# Patient Record
Sex: FEMALE | Race: WHITE | NOT HISPANIC OR LATINO | Employment: PART TIME | URBAN - METROPOLITAN AREA
[De-identification: names, ages, dates, MRNs, and addresses within clinical notes are randomized per-mention and may not be internally consistent; named-entity substitution may affect disease eponyms.]

---

## 2020-09-08 ENCOUNTER — OFFICE VISIT (OUTPATIENT)
Dept: OBGYN CLINIC | Facility: CLINIC | Age: 25
End: 2020-09-08
Payer: COMMERCIAL

## 2020-09-08 VITALS
BODY MASS INDEX: 32.31 KG/M2 | HEIGHT: 60 IN | TEMPERATURE: 97 F | DIASTOLIC BLOOD PRESSURE: 60 MMHG | SYSTOLIC BLOOD PRESSURE: 106 MMHG | WEIGHT: 164.6 LBS

## 2020-09-08 DIAGNOSIS — Z30.432 ENCOUNTER FOR IUD REMOVAL: Primary | ICD-10-CM

## 2020-09-10 PROCEDURE — 58301 REMOVE INTRAUTERINE DEVICE: CPT | Performed by: OBSTETRICS & GYNECOLOGY

## 2020-09-10 NOTE — PROGRESS NOTES
Gynecology   Gi Vargas 21 y o  female MRN: [id number]    Assessment/Plan     Assessment:  Encounter for IUD removal    Plan:  IUD removed today without complication  Discussed good health, PNV's preconception  RTO PRN/annual    History of Present Illness     HPI:  Gi Vargas is a 21 y o  female who presents for [facility] IUD removal   Planning to conceive in the next few months  No complaints  Historical Information   History reviewed  No pertinent past medical history  Past Surgical History:   Procedure Laterality Date    WISDOM TOOTH EXTRACTION  2008     OB/GYN History:   Nulligravida  No h/o cervical dysplasia or STD's    Family History   Problem Relation Age of Onset    Lupus Mother     Migraines Mother     Migraines Maternal Grandmother     Diabetes Maternal Grandfather     Arthritis Paternal Grandmother     Diabetes Paternal Grandmother     Hypertension Paternal Grandmother     Obesity Paternal Grandmother     Arthritis Paternal Grandfather     Arthritis Paternal Uncle     Breast cancer Other         PGGM    Migraines Maternal Aunt      Social History   Social History     Substance and Sexual Activity   Alcohol Use Yes    Comment: Social     Social History     Substance and Sexual Activity   Drug Use Never     Social History     Tobacco Use   Smoking Status Never Smoker   Smokeless Tobacco Never Used     E-Cigarette/Vaping    E-Cigarette Use Never User      E-Cigarette/Vaping Substances    Nicotine No     THC No     CBD No     Flavoring No     Other No     Unknown No        Meds/Allergies     Current Outpatient Medications:     levonorgestrel (ALEX) 13 5 MG intrauterine device, 1 Intra Uterine Device by Intrauterine route once, Disp: , Rfl:     No Known Allergies    Review of Systems   Respiratory: Negative for cough, shortness of breath, sputum production and wheezing  Gastrointestinal: Negative for bloating, abdominal pain, nausea and vomiting     Genitourinary: Negative for dysuria, frequency, non-menstrual bleeding and pelvic pain  Neurological: Negative for dizziness, headaches, light-headedness and weakness  Objective   Vitals: Blood pressure 106/60, temperature (!) 97 °F (36 1 °C), temperature source Tympanic, height 5' 0 25" (1 53 m), weight 74 7 kg (164 lb 9 6 oz), last menstrual period 08/16/2020  Physical Exam  Constitutional:       Appearance: Normal appearance  Genitourinary:      Vulva, urethra and vagina normal       IUD strings visualized  HENT:      Head: Normocephalic  Cardiovascular:      Rate and Rhythm: Normal rate and regular rhythm  Pulmonary:      Effort: Pulmonary effort is normal       Breath sounds: Normal breath sounds  Abdominal:      Palpations: Abdomen is soft  Tenderness: There is no abdominal tenderness  Musculoskeletal:         General: No swelling  Neurological:      General: No focal deficit present  Mental Status: She is alert and oriented to person, place, and time  Skin:     General: Skin is warm and dry  Psychiatric:         Mood and Affect: Mood normal          Behavior: Behavior normal    Vitals signs reviewed       Iud removal    Date/Time: 9/10/2020 7:50 AM  Performed by: Rianna Cope MD  Authorized by: Rianna Cope MD     Consent:     Consent obtained:  Verbal    Consent given by:  Patient    Procedure risks and benefits discussed: yes      Patient questions answered: yes      Patient agrees, verbalizes understanding, and wants to proceed: yes    Procedure:     Removed with no complications: yes      Other reason for removal:  Conception planning

## 2020-09-15 ENCOUNTER — TELEPHONE (OUTPATIENT)
Dept: OBGYN CLINIC | Facility: CLINIC | Age: 25
End: 2020-09-15

## 2020-09-15 NOTE — TELEPHONE ENCOUNTER
Pt had her iud removed a week ago and is having bleeding since that day but today started heavy  Is that ok?

## 2020-09-15 NOTE — TELEPHONE ENCOUNTER
Returned pts' p c - pt states "she did have some spotting after her iud was removed, but now today her blding is more like a period "  Informed pt that irreg blding is normal after iud removal & could very well be a menses- advised to monitor for now-keep track of date, as she is trying to conceive  Pt verbalized understanding

## 2020-12-01 ENCOUNTER — ULTRASOUND (OUTPATIENT)
Dept: OBGYN CLINIC | Facility: CLINIC | Age: 25
End: 2020-12-01
Payer: COMMERCIAL

## 2020-12-01 VITALS — WEIGHT: 171 LBS | SYSTOLIC BLOOD PRESSURE: 112 MMHG | BODY MASS INDEX: 33.12 KG/M2 | DIASTOLIC BLOOD PRESSURE: 78 MMHG

## 2020-12-01 DIAGNOSIS — N91.2 AMENORRHEA: Primary | ICD-10-CM

## 2020-12-01 PROCEDURE — 76830 TRANSVAGINAL US NON-OB: CPT | Performed by: OBSTETRICS & GYNECOLOGY

## 2020-12-01 PROCEDURE — 99214 OFFICE O/P EST MOD 30 MIN: CPT | Performed by: OBSTETRICS & GYNECOLOGY

## 2020-12-14 ENCOUNTER — TELEMEDICINE (OUTPATIENT)
Dept: OBGYN CLINIC | Facility: CLINIC | Age: 25
End: 2020-12-14

## 2020-12-14 DIAGNOSIS — Z34.01 ENCOUNTER FOR SUPERVISION OF NORMAL FIRST PREGNANCY IN FIRST TRIMESTER: Primary | ICD-10-CM

## 2020-12-14 PROCEDURE — OBC: Performed by: OBSTETRICS & GYNECOLOGY

## 2020-12-14 RX ORDER — ALBUTEROL SULFATE 90 UG/1
2 AEROSOL, METERED RESPIRATORY (INHALATION) EVERY 6 HOURS PRN
COMMUNITY
End: 2022-06-22 | Stop reason: ALTCHOICE

## 2020-12-29 ENCOUNTER — INITIAL PRENATAL (OUTPATIENT)
Dept: OBGYN CLINIC | Facility: CLINIC | Age: 25
End: 2020-12-29

## 2020-12-29 VITALS — SYSTOLIC BLOOD PRESSURE: 106 MMHG | WEIGHT: 170.8 LBS | BODY MASS INDEX: 33.08 KG/M2 | DIASTOLIC BLOOD PRESSURE: 66 MMHG

## 2020-12-29 DIAGNOSIS — Z11.3 SCREENING FOR STD (SEXUALLY TRANSMITTED DISEASE): ICD-10-CM

## 2020-12-29 DIAGNOSIS — B37.3 YEAST INFECTION OF THE VAGINA: ICD-10-CM

## 2020-12-29 DIAGNOSIS — Z34.01 ENCOUNTER FOR SUPERVISION OF NORMAL FIRST PREGNANCY IN FIRST TRIMESTER: Primary | ICD-10-CM

## 2020-12-29 PROBLEM — B37.31 YEAST INFECTION OF THE VAGINA: Status: ACTIVE | Noted: 2020-12-29

## 2020-12-29 LAB
SL AMB  POCT GLUCOSE, UA: NORMAL
SL AMB POCT URINE PROTEIN: NORMAL

## 2020-12-29 PROCEDURE — 87491 CHLMYD TRACH DNA AMP PROBE: CPT | Performed by: NURSE PRACTITIONER

## 2020-12-29 PROCEDURE — 87591 N.GONORRHOEAE DNA AMP PROB: CPT | Performed by: NURSE PRACTITIONER

## 2020-12-29 PROCEDURE — PNV: Performed by: NURSE PRACTITIONER

## 2020-12-29 PROCEDURE — G0145 SCR C/V CYTO,THINLAYER,RESCR: HCPCS | Performed by: NURSE PRACTITIONER

## 2021-01-01 LAB
C TRACH DNA SPEC QL NAA+PROBE: NEGATIVE
N GONORRHOEA DNA SPEC QL NAA+PROBE: NEGATIVE

## 2021-01-04 LAB
LAB AP GYN PRIMARY INTERPRETATION: NORMAL
Lab: NORMAL
PATH INTERP SPEC-IMP: NORMAL

## 2021-01-05 ENCOUNTER — TELEPHONE (OUTPATIENT)
Dept: OBGYN CLINIC | Facility: CLINIC | Age: 26
End: 2021-01-05

## 2021-01-05 ENCOUNTER — TELEPHONE (OUTPATIENT)
Dept: PERINATAL CARE | Facility: CLINIC | Age: 26
End: 2021-01-05

## 2021-01-05 NOTE — TELEPHONE ENCOUNTER
Patient has not received her prenatal packet as of yet  Would like to go for blood work but needs to go to [facility]  Faxed labs to [facility] # Fax [phone]

## 2021-01-05 NOTE — TELEPHONE ENCOUNTER
----- Message from [address] [place] [address] sent at 1/4/2021 11:45 AM EST -----  Please notify patient pap and Gc/Chlamydia are negative from PN1 visit  Her pap was positive for yeast, which I prescribed terconazole for at PN1 visit  Thank you!

## 2021-01-05 NOTE — TELEPHONE ENCOUNTER
Patient notified pap and gc/ch were negative  Patient already picked up antibiotic for yeast infection

## 2021-01-05 NOTE — TELEPHONE ENCOUNTER
Pt called today and would like to speak to someone about her blood work that she need to get done  She hasn't received her packet in the mail and is unaware of what she needs to get done

## 2021-01-05 NOTE — TELEPHONE ENCOUNTER
Attempted to reach patient by phone and left voicemail to confirm appointment for MFM ultrasound  1 support person ( must be over the age of 15) may accompany you for your appointment  If you or your support person have traveled outside the state in the past 2 weeks, please call and notify our office today #[phone]  You and your support person must wear a mask ,covering nose and mouth,during your entire visit  To minimize your exposure in our waiting room, please call our office prior to entering the building  Check in and rooming questions will be done via phone  We will give you directions when to enter for your appointment  Arlene: [phone]    IF you are not feeling well- cough, fever, shortness of breath or any flu like symptoms, contact your primary care physician or [phone] Giovana Dior  If you are awaiting COVID 19 test results please call and reschedule your appointment    Any questions with these instructions please call Maternal Fetal Medicine nurse line today @ # [phone]

## 2021-01-06 ENCOUNTER — ROUTINE PRENATAL (OUTPATIENT)
Dept: PERINATAL CARE | Facility: CLINIC | Age: 26
End: 2021-01-06
Payer: COMMERCIAL

## 2021-01-06 VITALS
WEIGHT: 171.2 LBS | BODY MASS INDEX: 32.32 KG/M2 | HEART RATE: 87 BPM | SYSTOLIC BLOOD PRESSURE: 125 MMHG | HEIGHT: 61 IN | DIASTOLIC BLOOD PRESSURE: 60 MMHG

## 2021-01-06 DIAGNOSIS — Z3A.13 13 WEEKS GESTATION OF PREGNANCY: Primary | ICD-10-CM

## 2021-01-06 DIAGNOSIS — J45.20 INTERMITTENT ASTHMA WITHOUT COMPLICATION, UNSPECIFIED ASTHMA SEVERITY: ICD-10-CM

## 2021-01-06 DIAGNOSIS — Z13.79 GENETIC SCREENING: ICD-10-CM

## 2021-01-06 DIAGNOSIS — Z34.01 ENCOUNTER FOR SUPERVISION OF NORMAL FIRST PREGNANCY IN FIRST TRIMESTER: ICD-10-CM

## 2021-01-06 DIAGNOSIS — Z36.82 NUCHAL TRANSLUCENCY OF FETUS ON PRENATAL ULTRASOUND: ICD-10-CM

## 2021-01-06 PROCEDURE — 76813 OB US NUCHAL MEAS 1 GEST: CPT | Performed by: OBSTETRICS & GYNECOLOGY

## 2021-01-06 PROCEDURE — 99213 OFFICE O/P EST LOW 20 MIN: CPT | Performed by: OBSTETRICS & GYNECOLOGY

## 2021-01-06 NOTE — LETTER
2021     Narinder Hubbard, [address] [place] [address]    Patient: Jessica Shane   YOB: 1995   Date of Visit: 2021       Dear Dr Leni Magana: Thank you for referring Jessica Shane to me for evaluation  Below are my notes for this consultation  If you have questions, please do not hesitate to call me  I look forward to following your patient along with you  Sincerely,        Ozzy Valles MD        CC: No Recipients  Ozzy Valles MD  2021  8:35 AM  Sign when Signing Visit  Ob ultrasound and brief [facility] consulation    Ms Jeancarlos Dodge is a 30 yo   patient at 15 3/7 weeks  An ultrasound for viability, dating and nuchal translucency was completed today  See Ob Procedures in EPIC  1  Live, tompkins fetus with size = dates; MARIBEL 2021  Normal nuchal translucency  The Sequential Screen was discussed in detail, including the sensitivity for detection of Down syndrome estimated at 95%  Definitive prenatal diagnosis is possible only through genetic amniocentesis or CVS         Cell-free DNA (cfDNA)  (Non invasive prenatal testing)-NIPT) screening has a 99% detection rate for Down syndrome, 96% for trisomy 18, and 91% for trisomy 13 with <1% false positive rate    It may need to obtain approval from the insurance company  Ms Jeancarlos Dodge declined use of screening or diagnostic tests for fetal aneuploidy  Ob Hx: Primigravida      Medical hx: Asthma, diagnosed at age 1, not admitted to hospital       Surgical hx:  None      Medications: PNV; Claritin- seasonal; asthma inhaler  Allergies: seasonal  Family Hx: non contributory      Social Hx: No tobacco alcohol or illicit drug use  I reviewed the results of this ultrasound with Ms Jeancarlos Dodge and answered her questions  History of intermittent asthma  Asthma complicates 1-4% of pregnancies   The majority of women with asthma experience stability or improvement in symptoms in pregnancy, while approximately 1/3 experience worsening  Need for a rescue inhaler more than twice weekly indicates suboptimal asthma control and need for escalation in therapy  The majority of asthma medications are safe for pregnancy, and disease control is important for maternal and fetal health in pregnancy  If her symptoms remain well-controlled in pregnancy, no additional obstetric surveillance is indicated  However, if control is poor, there is a risk of fetal growth restriction, and evaluation of fetal growth in the second half of pregnancy is likely warranted  Prevention of respiratory morbidity is particularly important in pregnancy  Annual influenza recommendation is  recommended, as is pneumococcal vaccination if not performed previously  Recommendations:      1  [facility] for [facility] from 16 and before 22 weeks, to be ordered and followed from your office  2  Fetal Level II ultrasound imaging is scheduled at about 20 weeks gestation  The total time spent on this established patient on the encounter date was 20 minutes  This time included previsit service time of 5 minutes, face-to-face  service time of 10 minutes discussing the results of the ultrasound, medical history, findings and recomendations, and post-service time of 5 minutes  Thank you for referring your patient to our offices  The limitations of ultrasound to detect all anomalies was reviewed and how it is not  a test to rule out aneuploidy  If you have any further questions do not hesitate to contact us as [phone]      Nilson Lozada MD

## 2021-01-07 PROBLEM — J45.909 ASTHMA: Status: ACTIVE | Noted: 2021-01-07

## 2021-01-07 PROBLEM — Z3A.13 13 WEEKS GESTATION OF PREGNANCY: Status: ACTIVE | Noted: 2021-01-07

## 2021-01-07 PROBLEM — Z13.79 GENETIC SCREENING: Status: ACTIVE | Noted: 2021-01-07

## 2021-01-07 PROBLEM — Z36.82 NUCHAL TRANSLUCENCY OF FETUS ON PRENATAL ULTRASOUND: Status: ACTIVE | Noted: 2021-01-07

## 2021-01-07 LAB
ABO GROUP BLD: NORMAL
APPEARANCE UR: CLEAR
BACTERIA UR QL AUTO: ABNORMAL /HPF
BASOPHILS # BLD AUTO: 8 CELLS/UL (ref 0–200)
BASOPHILS NFR BLD AUTO: 0.1 %
BILIRUB UR QL STRIP: NEGATIVE
BLD GP AB SCN SERPL QL: NORMAL
COLOR UR: YELLOW
EOSINOPHIL # BLD AUTO: 149 CELLS/UL (ref 15–500)
EOSINOPHIL NFR BLD AUTO: 1.8 %
ERYTHROCYTE [DISTWIDTH] IN BLOOD BY AUTOMATED COUNT: 11.6 % (ref 11–15)
GLUCOSE UR QL STRIP: NEGATIVE
HBV SURFACE AG SERPL QL IA: NORMAL
HCT VFR BLD AUTO: 37.7 % (ref 35–45)
HGB BLD-MCNC: 12.8 G/DL (ref 11.7–15.5)
HGB UR QL STRIP: NEGATIVE
HIV 1+2 AB+HIV1 P24 AG SERPL QL IA: NORMAL
HYALINE CASTS #/AREA URNS LPF: ABNORMAL /LPF
KETONES UR QL STRIP: ABNORMAL
LEUKOCYTE ESTERASE UR QL STRIP: NEGATIVE
LYMPHOCYTES # BLD AUTO: 1975 CELLS/UL (ref 850–3900)
LYMPHOCYTES NFR BLD AUTO: 23.8 %
MCH RBC QN AUTO: 30 PG (ref 27–33)
MCHC RBC AUTO-ENTMCNC: 34 G/DL (ref 32–36)
MCV RBC AUTO: 88.3 FL (ref 80–100)
MONOCYTES # BLD AUTO: 440 CELLS/UL (ref 200–950)
MONOCYTES NFR BLD AUTO: 5.3 %
NEUTROPHILS # BLD AUTO: 5727 CELLS/UL (ref 1500–7800)
NEUTROPHILS NFR BLD AUTO: 69 %
NITRITE UR QL STRIP: NEGATIVE
PH UR STRIP: 6 [PH] (ref 5–8)
PLATELET # BLD AUTO: 323 THOUSAND/UL (ref 140–400)
PMV BLD REES-ECKER: 12 FL (ref 7.5–12.5)
PROT UR QL STRIP: NEGATIVE
RBC # BLD AUTO: 4.27 MILLION/UL (ref 3.8–5.1)
RBC #/AREA URNS HPF: ABNORMAL /HPF
RH BLD: NORMAL
RUBV IGG SERPL IA-ACNC: 2.71 INDEX
SP GR UR STRIP: 1.02 (ref 1–1.03)
SQUAMOUS #/AREA URNS HPF: ABNORMAL /HPF
WBC # BLD AUTO: 8.3 THOUSAND/UL (ref 3.8–10.8)
WBC #/AREA URNS HPF: ABNORMAL /HPF

## 2021-01-07 NOTE — PROGRESS NOTES
Ob ultrasound and brief [facility] consulation    Ms Cristi Gatica is a 30 yo   patient at 15 3/7 weeks  An ultrasound for viability, dating and nuchal translucency was completed today  See Ob Procedures in EPIC  1  Live, tompkins fetus with size = dates; MARIBEL 2021  Normal nuchal translucency  The Sequential Screen was discussed in detail, including the sensitivity for detection of Down syndrome estimated at 95%  Definitive prenatal diagnosis is possible only through genetic amniocentesis or CVS         Cell-free DNA (cfDNA)  (Non invasive prenatal testing)-NIPT) screening has a 99% detection rate for Down syndrome, 96% for trisomy 18, and 91% for trisomy 13 with <1% false positive rate    It may need to obtain approval from the insurance company  Ms Cristi Gatica declined use of screening or diagnostic tests for fetal aneuploidy  Ob Hx: Primigravida      Medical hx: Asthma, diagnosed at age 1, not admitted to hospital       Surgical hx:  None      Medications: PNV; Claritin- seasonal; asthma inhaler  Allergies: seasonal  Family Hx: non contributory      Social Hx: No tobacco alcohol or illicit drug use  I reviewed the results of this ultrasound with Ms Cristi Gatica and answered her questions  History of intermittent asthma  Asthma complicates 6-5% of pregnancies  The majority of women with asthma experience stability or improvement in symptoms in pregnancy, while approximately 1/3 experience worsening  Need for a rescue inhaler more than twice weekly indicates suboptimal asthma control and need for escalation in therapy  The majority of asthma medications are safe for pregnancy, and disease control is important for maternal and fetal health in pregnancy  If her symptoms remain well-controlled in pregnancy, no additional obstetric surveillance is indicated   However, if control is poor, there is a risk of fetal growth restriction, and evaluation of fetal growth in the second half of pregnancy is likely warranted  Prevention of respiratory morbidity is particularly important in pregnancy  Annual influenza recommendation is  recommended, as is pneumococcal vaccination if not performed previously  Recommendations:      1  [facility] to screen for ONTD's from 16 and before 22 weeks, to be ordered and followed from your office  2  Fetal Level II ultrasound imaging is scheduled at about 20 weeks gestation  The total time spent on this established patient on the encounter date was 20 minutes  This time included previsit service time of 5 minutes, face-to-face  service time of 10 minutes discussing the results of the ultrasound, medical history, findings and recomendations, and post-service time of 5 minutes  Thank you for referring your patient to our offices  The limitations of ultrasound to detect all anomalies was reviewed and how it is not  a test to rule out aneuploidy  If you have any further questions do not hesitate to contact us as [phone]      Alexa Sandoval MD

## 2021-01-08 ENCOUNTER — TELEPHONE (OUTPATIENT)
Dept: OBGYN CLINIC | Facility: CLINIC | Age: 26
End: 2021-01-08

## 2021-02-03 ENCOUNTER — ROUTINE PRENATAL (OUTPATIENT)
Dept: OBGYN CLINIC | Facility: CLINIC | Age: 26
End: 2021-02-03

## 2021-02-03 VITALS — DIASTOLIC BLOOD PRESSURE: 74 MMHG | SYSTOLIC BLOOD PRESSURE: 112 MMHG | BODY MASS INDEX: 32.58 KG/M2 | WEIGHT: 169.6 LBS

## 2021-02-03 DIAGNOSIS — Z34.02 ENCOUNTER FOR SUPERVISION OF NORMAL FIRST PREGNANCY IN SECOND TRIMESTER: Primary | ICD-10-CM

## 2021-02-03 DIAGNOSIS — Z3A.17 17 WEEKS GESTATION OF PREGNANCY: ICD-10-CM

## 2021-02-03 PROBLEM — B37.31 YEAST INFECTION OF THE VAGINA: Status: RESOLVED | Noted: 2020-12-29 | Resolved: 2021-02-03

## 2021-02-03 PROBLEM — B37.3 YEAST INFECTION OF THE VAGINA: Status: RESOLVED | Noted: 2020-12-29 | Resolved: 2021-02-03

## 2021-02-03 LAB
SL AMB  POCT GLUCOSE, UA: NORMAL
SL AMB POCT URINE PROTEIN: NORMAL

## 2021-02-03 PROCEDURE — PNV: Performed by: OBSTETRICS & GYNECOLOGY

## 2021-02-03 NOTE — PROGRESS NOTES
+ flutter  Having some issues with high sugar loads; discussed gastric emptying, dietary modifications  Some mild HA's as well  Level II scheduled  RTO 3 weeks

## 2021-02-03 NOTE — PROGRESS NOTES
Level 2 scheduled  Started vomiting every morning, on 2/2 vomiting all day  Has vomiting after having anything sweet  Denies LOF, VB, CTX  Is feeling a "pulling" sensation

## 2021-02-23 ENCOUNTER — TELEPHONE (OUTPATIENT)
Dept: PERINATAL CARE | Facility: CLINIC | Age: 26
End: 2021-02-23

## 2021-02-23 NOTE — TELEPHONE ENCOUNTER
Spoke with patient and confirmed appointment with [facility]  1 support person ( must be over age of 15) may accompany patient  Will you and your support person be able to wear a mask ,without a valve , during entire appointment? YES   To minimize your exposure in our waiting area,check in and rooming questions will be done via phone  When you arrive in the parking lot please call the following inside line # prior to entering office:    Analilia Jensen: [phone]    Have you or your support person traveled outside the state in the last 2 weeks? NO  If yes, what state did you travel to? Do you or your support person have:  Fever or flu- like symptoms? NO  Symptoms of upper respiratory infection like runny nose, sore throat or cough? NO  Do you have new headache that you have not had in the past?NO  Have you experienced any new shortness of breath recently? NO  Do you have any new loss of taste or smell? NO  Do you have any new diarrhea, nausea or vomiting? NO  Have you recently been in contact with anyone who has been sick or diagnosed with COVID-19 infection? NO  Have you been recommended to quarantine because of an exposure to a confirmed positive COVID19 person? NO  Have you recently been tested for COVID19? NO    Patient verbalized understanding of all instructions   -------------------------------------------------------------

## 2021-02-24 ENCOUNTER — ROUTINE PRENATAL (OUTPATIENT)
Dept: PERINATAL CARE | Facility: CLINIC | Age: 26
End: 2021-02-24
Payer: COMMERCIAL

## 2021-02-24 VITALS
BODY MASS INDEX: 33.64 KG/M2 | HEIGHT: 61 IN | WEIGHT: 178.2 LBS | DIASTOLIC BLOOD PRESSURE: 78 MMHG | SYSTOLIC BLOOD PRESSURE: 126 MMHG | HEART RATE: 101 BPM

## 2021-02-24 DIAGNOSIS — O99.212 MATERNAL OBESITY, ANTEPARTUM, SECOND TRIMESTER: Primary | ICD-10-CM

## 2021-02-24 DIAGNOSIS — Z3A.20 20 WEEKS GESTATION OF PREGNANCY: ICD-10-CM

## 2021-02-24 DIAGNOSIS — Z36.86 ENCOUNTER FOR ANTENATAL SCREENING FOR CERVICAL LENGTH: ICD-10-CM

## 2021-02-24 PROCEDURE — 76817 TRANSVAGINAL US OBSTETRIC: CPT | Performed by: OBSTETRICS & GYNECOLOGY

## 2021-02-24 PROCEDURE — 76811 OB US DETAILED SNGL FETUS: CPT | Performed by: OBSTETRICS & GYNECOLOGY

## 2021-02-24 PROCEDURE — 99213 OFFICE O/P EST LOW 20 MIN: CPT | Performed by: OBSTETRICS & GYNECOLOGY

## 2021-02-24 RX ORDER — ASPIRIN 81 MG/1
162 TABLET, CHEWABLE ORAL DAILY
Qty: 180 TABLET | Refills: 1 | Status: SHIPPED | OUTPATIENT
Start: 2021-02-24 | End: 2021-06-28 | Stop reason: HOSPADM

## 2021-02-24 NOTE — PROGRESS NOTES
Please refer to the [facility] ultrasound report in Ob Procedures for additional information regarding today's visit

## 2021-02-24 NOTE — PROGRESS NOTES
Ultrasound Probe Disinfection    A transvaginal ultrasound was performed  Prior to use, disinfection was performed with High Level Disinfection Process (Trophon)  Probe serial number [id number] was used        Rocio Officer  02/24/21  8:28 AM

## 2021-02-24 NOTE — LETTER
February 24, 2021     Betsy Salter, [address]    Patient: Kurt Ruvalcaba   YOB: 1995   Date of Visit: 2/24/2021       Dear Dr Ashley Almanzar: Thank you for referring Kurt Ruvalcaba to me for evaluation  Below are my notes for this consultation  If you have questions, please do not hesitate to call me  I look forward to following your patient along with you  Sincerely,        Veronica Holguin MD        CC: No Recipients  Veronica Holguin MD  2/24/2021  7:05 AM  Sign when Signing Visit    Please refer to the [facility] ultrasound report in Ob Procedures for additional information regarding today's visit

## 2021-03-02 ENCOUNTER — ROUTINE PRENATAL (OUTPATIENT)
Dept: OBGYN CLINIC | Facility: CLINIC | Age: 26
End: 2021-03-02

## 2021-03-02 VITALS — BODY MASS INDEX: 34.5 KG/M2 | SYSTOLIC BLOOD PRESSURE: 112 MMHG | WEIGHT: 179.6 LBS | DIASTOLIC BLOOD PRESSURE: 62 MMHG

## 2021-03-02 DIAGNOSIS — Z34.02 ENCOUNTER FOR SUPERVISION OF NORMAL FIRST PREGNANCY IN SECOND TRIMESTER: Primary | ICD-10-CM

## 2021-03-02 LAB
SL AMB  POCT GLUCOSE, UA: NORMAL
SL AMB POCT URINE PROTEIN: NORMAL

## 2021-03-02 PROCEDURE — PNV: Performed by: OBSTETRICS & GYNECOLOGY

## 2021-03-02 NOTE — PROGRESS NOTES
Has not started 81 ASA  Level 2 was done  Felling better- fatigue has diminished     28 wk labs given to patient

## 2021-04-01 ENCOUNTER — ROUTINE PRENATAL (OUTPATIENT)
Dept: OBGYN CLINIC | Facility: CLINIC | Age: 26
End: 2021-04-01

## 2021-04-01 VITALS — WEIGHT: 186.6 LBS | DIASTOLIC BLOOD PRESSURE: 78 MMHG | SYSTOLIC BLOOD PRESSURE: 132 MMHG | BODY MASS INDEX: 35.84 KG/M2

## 2021-04-01 DIAGNOSIS — Z34.02 ENCOUNTER FOR SUPERVISION OF NORMAL FIRST PREGNANCY IN SECOND TRIMESTER: Primary | ICD-10-CM

## 2021-04-01 LAB
SL AMB  POCT GLUCOSE, UA: NORMAL
SL AMB POCT URINE PROTEIN: NORMAL

## 2021-04-01 PROCEDURE — PNV: Performed by: OBSTETRICS & GYNECOLOGY

## 2021-04-16 ENCOUNTER — TELEPHONE (OUTPATIENT)
Dept: OBGYN CLINIC | Facility: CLINIC | Age: 26
End: 2021-04-16

## 2021-04-16 LAB
BASOPHILS # BLD AUTO: 32 CELLS/UL (ref 0–200)
BASOPHILS NFR BLD AUTO: 0.3 %
EOSINOPHIL # BLD AUTO: 205 CELLS/UL (ref 15–500)
EOSINOPHIL NFR BLD AUTO: 1.9 %
ERYTHROCYTE [DISTWIDTH] IN BLOOD BY AUTOMATED COUNT: 13 % (ref 11–15)
GLUCOSE 1H P 50 G GLC PO SERPL-MCNC: 85 MG/DL
HCT VFR BLD AUTO: 37.3 % (ref 35–45)
HGB BLD-MCNC: 12.7 G/DL (ref 11.7–15.5)
LYMPHOCYTES # BLD AUTO: 2149 CELLS/UL (ref 850–3900)
LYMPHOCYTES NFR BLD AUTO: 19.9 %
MCH RBC QN AUTO: 30 PG (ref 27–33)
MCHC RBC AUTO-ENTMCNC: 34 G/DL (ref 32–36)
MCV RBC AUTO: 88 FL (ref 80–100)
MONOCYTES # BLD AUTO: 950 CELLS/UL (ref 200–950)
MONOCYTES NFR BLD AUTO: 8.8 %
NEUTROPHILS # BLD AUTO: 7463 CELLS/UL (ref 1500–7800)
NEUTROPHILS NFR BLD AUTO: 69.1 %
PLATELET # BLD AUTO: 281 THOUSAND/UL (ref 140–400)
PMV BLD REES-ECKER: 11.9 FL (ref 7.5–12.5)
RBC # BLD AUTO: 4.24 MILLION/UL (ref 3.8–5.1)
RPR SER QL: NORMAL
WBC # BLD AUTO: 10.8 THOUSAND/UL (ref 3.8–10.8)

## 2021-04-16 NOTE — TELEPHONE ENCOUNTER
Per comm consent lm labs WNL    ----- Message from Markell Garcia MD sent at 4/16/2021 12:50 PM EDT -----  Notify all  normal

## 2021-04-26 ENCOUNTER — TELEPHONE (OUTPATIENT)
Dept: PERINATAL CARE | Facility: CLINIC | Age: 26
End: 2021-04-26

## 2021-04-26 NOTE — TELEPHONE ENCOUNTER
Spoke with patient and confirmed her MFM appointment had to be rescheduled  Patient verbalized understanding of new time, date and location of appointment - 5/25/21  8:45  [place]  Patient denies further questions

## 2021-04-29 ENCOUNTER — ROUTINE PRENATAL (OUTPATIENT)
Dept: OBGYN CLINIC | Facility: CLINIC | Age: 26
End: 2021-04-29
Payer: COMMERCIAL

## 2021-04-29 VITALS — BODY MASS INDEX: 36.76 KG/M2 | DIASTOLIC BLOOD PRESSURE: 72 MMHG | WEIGHT: 191.4 LBS | SYSTOLIC BLOOD PRESSURE: 115 MMHG

## 2021-04-29 DIAGNOSIS — Z23 NEED FOR TDAP VACCINATION: ICD-10-CM

## 2021-04-29 DIAGNOSIS — Z34.03 ENCOUNTER FOR SUPERVISION OF NORMAL FIRST PREGNANCY IN THIRD TRIMESTER: Primary | ICD-10-CM

## 2021-04-29 LAB
SL AMB  POCT GLUCOSE, UA: NORMAL
SL AMB POCT URINE PROTEIN: NORMAL

## 2021-04-29 PROCEDURE — 90715 TDAP VACCINE 7 YRS/> IM: CPT

## 2021-04-29 PROCEDURE — PNV: Performed by: OBSTETRICS & GYNECOLOGY

## 2021-04-29 PROCEDURE — 90471 IMMUNIZATION ADMIN: CPT

## 2021-04-29 RX ORDER — LORATADINE 10 MG/1
10 TABLET ORAL DAILY
COMMUNITY
End: 2021-07-01

## 2021-04-29 NOTE — PROGRESS NOTES
Delio Cabrera is overall doing well  Discussed cramping/discomfort - usually resolves very quickly  28wk labs normal   Yellow folder reviewed and delivery consent signed today  Encouraged COVID vaccine - this is recommended in pregnancy  Discussed increased risk of severe disease in pregnancy  TDAP given today  plt 79 today, pt with h/o ITP, however plt may be lower than baseline given high suspicion for COVID-19 infection. No signs of bleeding  - trend CBC  - keep active type and screen

## 2021-04-29 NOTE — PROGRESS NOTES
Patient presents for a routine prenatal visit  29W4D  Good Fetal Movement  No LOF,bleeding or discharge  28wk LABs completed    Yellow folder given and L&D consent signed at today's visit  Patient c/o cramping or pulling in lower abdomin when working out or walking a lot

## 2021-05-11 ENCOUNTER — ROUTINE PRENATAL (OUTPATIENT)
Dept: OBGYN CLINIC | Facility: CLINIC | Age: 26
End: 2021-05-11

## 2021-05-11 VITALS — SYSTOLIC BLOOD PRESSURE: 128 MMHG | DIASTOLIC BLOOD PRESSURE: 64 MMHG | WEIGHT: 200 LBS | BODY MASS INDEX: 38.42 KG/M2

## 2021-05-11 DIAGNOSIS — Z34.03 ENCOUNTER FOR SUPERVISION OF NORMAL FIRST PREGNANCY IN THIRD TRIMESTER: Primary | ICD-10-CM

## 2021-05-11 LAB
SL AMB  POCT GLUCOSE, UA: NORMAL
SL AMB POCT URINE PROTEIN: NORMAL

## 2021-05-11 PROCEDURE — PNV: Performed by: OBSTETRICS & GYNECOLOGY

## 2021-05-11 NOTE — PROGRESS NOTES
All PN labs complete  Breast pump rx faxed  Will return Birth Plan next visit  Denies VB  Clear/yellow liquid discharge x1 week, Pedro Counts throughout the day  GFM!

## 2021-05-25 ENCOUNTER — ULTRASOUND (OUTPATIENT)
Dept: PERINATAL CARE | Facility: CLINIC | Age: 26
End: 2021-05-25
Payer: COMMERCIAL

## 2021-05-25 VITALS — BODY MASS INDEX: 39.38 KG/M2 | WEIGHT: 200.6 LBS | HEIGHT: 60 IN

## 2021-05-25 DIAGNOSIS — Z3A.33 33 WEEKS GESTATION OF PREGNANCY: ICD-10-CM

## 2021-05-25 DIAGNOSIS — O99.213 OBESITY COMPLICATING PREGNANCY, THIRD TRIMESTER: Primary | ICD-10-CM

## 2021-05-25 PROBLEM — Z36.82 NUCHAL TRANSLUCENCY OF FETUS ON PRENATAL ULTRASOUND: Status: RESOLVED | Noted: 2021-01-07 | Resolved: 2021-05-25

## 2021-05-25 PROBLEM — Z13.79 GENETIC SCREENING: Status: RESOLVED | Noted: 2021-01-07 | Resolved: 2021-05-25

## 2021-05-25 PROCEDURE — 76816 OB US FOLLOW-UP PER FETUS: CPT | Performed by: OBSTETRICS & GYNECOLOGY

## 2021-05-25 NOTE — LETTER
May 25, 2021     Juan Amezcua, [address]    Patient: Jose Gallegos   YOB: 1995   Date of Visit: 5/25/2021       Dear Dr Sherley Ding: Thank you for referring Jose Gallegos to me for evaluation  Below are my notes for this consultation  If you have questions, please do not hesitate to call me  I look forward to following your patient along with you  Sincerely,        Chloe Canales MD        CC: No Recipients  Chloe Canales MD  5/25/2021  9:14 PM  Sign when Signing Visit  Jose Gallegos has no complaints today  She reports regular fetal movements and does not report any problems  She is here today at 33w2d for an ultrasound for Fetal growth  She had a normal Glucola screen  Problem list:  1  Increased BMI of 39   2   Increased weight gain in pregnancy(30 lb)    Ultrasound findings: The ultrasound today shows normal interval fetal growth and fluid  Cranial measurements and lower extremities are smaller which is likely secondary to family genetics as she is only 5 foot  Pregnancy ultrasound has limitations and is unable to detect all forms of fetal congenital abnormalities  The inaccuracy in the EFW can be off by 1 lb either way in the third trimester  Follow up recommended:   1  Recommend a follow-up ultrasound in 4 weeks for growth  2  Patients with a BMI of greater than 40 have a higher risk for stillbirth beyond 37 weeks of pregnancy   Should she attained a BMI of 40 or greater than recommend she be started on weekly NST/fluid scans from 36 weeks on     Chloe Canales MD

## 2021-05-26 NOTE — PROGRESS NOTES
Lewis Trimble has no complaints today  She reports regular fetal movements and does not report any problems  She is here today at 33w2d for an ultrasound for Fetal growth  She had a normal Glucola screen  Problem list:  1  Increased BMI of 39   2   Increased weight gain in pregnancy(30 lb)    Ultrasound findings: The ultrasound today shows normal interval fetal growth and fluid  Cranial measurements and lower extremities are smaller which is likely secondary to family genetics as she is only 5 foot  Pregnancy ultrasound has limitations and is unable to detect all forms of fetal congenital abnormalities  The inaccuracy in the EFW can be off by 1 lb either way in the third trimester  Follow up recommended:   1  Recommend a follow-up ultrasound in 4 weeks for growth  2  Patients with a BMI of greater than 40 have a higher risk for stillbirth beyond 37 weeks of pregnancy   Should she attained a BMI of 40 or greater than recommend she be started on weekly NST/fluid scans from 36 weeks on     Anastasia Gusman MD

## 2021-05-27 ENCOUNTER — ROUTINE PRENATAL (OUTPATIENT)
Dept: OBGYN CLINIC | Facility: CLINIC | Age: 26
End: 2021-05-27

## 2021-05-27 VITALS — WEIGHT: 201.2 LBS | DIASTOLIC BLOOD PRESSURE: 80 MMHG | SYSTOLIC BLOOD PRESSURE: 120 MMHG | BODY MASS INDEX: 39.29 KG/M2

## 2021-05-27 DIAGNOSIS — Z34.03 ENCOUNTER FOR SUPERVISION OF NORMAL FIRST PREGNANCY IN THIRD TRIMESTER: Primary | ICD-10-CM

## 2021-05-27 LAB
SL AMB  POCT GLUCOSE, UA: ABNORMAL
SL AMB POCT URINE PROTEIN: ABNORMAL

## 2021-05-27 PROCEDURE — PNV: Performed by: OBSTETRICS & GYNECOLOGY

## 2021-05-27 NOTE — PROGRESS NOTES
Nona is doing well  Discussed her growth scan - [facility] recommends repeat growth in 4 wks, and to start APFS at 36wks if BMI >40  Discussed rationale behind this  She will call and schedule her growth scan  Discussed increasing her PO hydration when it is hot out/with BH contractions  Baby very active, reviewed kick counts  Discussed COVID vaccine and pregnancy - she plans to get postpartum

## 2021-05-27 NOTE — PROGRESS NOTES
Patient presents for a routine prenatal visit  35W4D  GFM   No LOF, VB  She is having occasional BH CTX as well as some swelling in her feet when she is on them a lot or it's hot outside

## 2021-06-10 ENCOUNTER — APPOINTMENT (OUTPATIENT)
Dept: LAB | Facility: CLINIC | Age: 26
End: 2021-06-10
Payer: COMMERCIAL

## 2021-06-10 ENCOUNTER — ROUTINE PRENATAL (OUTPATIENT)
Dept: OBGYN CLINIC | Facility: CLINIC | Age: 26
End: 2021-06-10

## 2021-06-10 VITALS — BODY MASS INDEX: 39.84 KG/M2 | DIASTOLIC BLOOD PRESSURE: 80 MMHG | SYSTOLIC BLOOD PRESSURE: 122 MMHG | WEIGHT: 204 LBS

## 2021-06-10 DIAGNOSIS — Z34.03 ENCOUNTER FOR SUPERVISION OF NORMAL FIRST PREGNANCY IN THIRD TRIMESTER: Primary | ICD-10-CM

## 2021-06-10 DIAGNOSIS — O12.13 PROTEINURIA AFFECTING PREGNANCY IN THIRD TRIMESTER: ICD-10-CM

## 2021-06-10 LAB
ALBUMIN SERPL BCP-MCNC: 2.3 G/DL (ref 3.5–5)
ALP SERPL-CCNC: 196 U/L (ref 46–116)
ALT SERPL W P-5'-P-CCNC: 17 U/L (ref 12–78)
ANION GAP SERPL CALCULATED.3IONS-SCNC: 7 MMOL/L (ref 4–13)
AST SERPL W P-5'-P-CCNC: 12 U/L (ref 5–45)
BILIRUB SERPL-MCNC: 0.35 MG/DL (ref 0.2–1)
BUN SERPL-MCNC: 6 MG/DL (ref 5–25)
CALCIUM ALBUM COR SERPL-MCNC: 10.6 MG/DL (ref 8.3–10.1)
CALCIUM SERPL-MCNC: 9.2 MG/DL (ref 8.3–10.1)
CHLORIDE SERPL-SCNC: 108 MMOL/L (ref 100–108)
CO2 SERPL-SCNC: 24 MMOL/L (ref 21–32)
CREAT SERPL-MCNC: 0.58 MG/DL (ref 0.6–1.3)
CREAT UR-MCNC: 158 MG/DL
ERYTHROCYTE [DISTWIDTH] IN BLOOD BY AUTOMATED COUNT: 14.1 % (ref 11.6–15.1)
GFR SERPL CREATININE-BSD FRML MDRD: 128 ML/MIN/1.73SQ M
GLUCOSE P FAST SERPL-MCNC: 77 MG/DL (ref 65–99)
HCT VFR BLD AUTO: 41.6 % (ref 34.8–46.1)
HGB BLD-MCNC: 13.5 G/DL (ref 11.5–15.4)
MCH RBC QN AUTO: 29.6 PG (ref 26.8–34.3)
MCHC RBC AUTO-ENTMCNC: 32.5 G/DL (ref 31.4–37.4)
MCV RBC AUTO: 91 FL (ref 82–98)
PLATELET # BLD AUTO: 181 THOUSANDS/UL (ref 149–390)
PMV BLD AUTO: 13.5 FL (ref 8.9–12.7)
POTASSIUM SERPL-SCNC: 4.9 MMOL/L (ref 3.5–5.3)
PROT SERPL-MCNC: 6 G/DL (ref 6.4–8.2)
PROT UR-MCNC: 67 MG/DL
PROT/CREAT UR: 0.42 MG/G{CREAT} (ref 0–0.1)
RBC # BLD AUTO: 4.56 MILLION/UL (ref 3.81–5.12)
SL AMB  POCT GLUCOSE, UA: ABNORMAL
SL AMB POCT URINE PROTEIN: 3
SODIUM SERPL-SCNC: 139 MMOL/L (ref 136–145)
WBC # BLD AUTO: 10.64 THOUSAND/UL (ref 4.31–10.16)

## 2021-06-10 PROCEDURE — 80053 COMPREHEN METABOLIC PANEL: CPT

## 2021-06-10 PROCEDURE — PNV: Performed by: OBSTETRICS & GYNECOLOGY

## 2021-06-10 PROCEDURE — 85027 COMPLETE CBC AUTOMATED: CPT

## 2021-06-10 PROCEDURE — 84156 ASSAY OF PROTEIN URINE: CPT

## 2021-06-10 PROCEDURE — 36415 COLL VENOUS BLD VENIPUNCTURE: CPT

## 2021-06-10 PROCEDURE — 82570 ASSAY OF URINE CREATININE: CPT

## 2021-06-10 NOTE — PROGRESS NOTES
Had long discussion regarding growth sono  Patient declines at this time  Reinforced doing daily FKC's  Good FM  Some BH's at night  No VB   + 3 proteinuria today  Denies HA, scotomata, RUQ pain  LE edema unchanged  Preeclampsia labs ordered  RTO 1 week if labs ok

## 2021-06-10 NOTE — PROGRESS NOTES
Has increased hydration-feeling better  Patient has decided not to schedule a growth scan at [facility]  Birth plan returned  Already received Breast pump  Denies LOF, VB    CTX at night x1 week  Not painful  GFM!

## 2021-06-11 ENCOUNTER — TELEPHONE (OUTPATIENT)
Dept: OBGYN CLINIC | Facility: CLINIC | Age: 26
End: 2021-06-11

## 2021-06-11 NOTE — TELEPHONE ENCOUNTER
----- Message from Diamante Acosta MD sent at 6/11/2021  7:15 AM EDT -----  Notify all normal but protein in urine is elevated  May be early sign of preeclampsia  Review warning signs of preeclampsia with patient  Follow up as scheduled in office

## 2021-06-24 ENCOUNTER — ROUTINE PRENATAL (OUTPATIENT)
Dept: OBGYN CLINIC | Facility: CLINIC | Age: 26
End: 2021-06-24

## 2021-06-24 ENCOUNTER — APPOINTMENT (OUTPATIENT)
Dept: LAB | Facility: CLINIC | Age: 26
End: 2021-06-24
Payer: COMMERCIAL

## 2021-06-24 ENCOUNTER — TELEPHONE (OUTPATIENT)
Dept: OBGYN CLINIC | Facility: CLINIC | Age: 26
End: 2021-06-24

## 2021-06-24 VITALS — DIASTOLIC BLOOD PRESSURE: 80 MMHG | BODY MASS INDEX: 41.09 KG/M2 | SYSTOLIC BLOOD PRESSURE: 130 MMHG | WEIGHT: 210.4 LBS

## 2021-06-24 DIAGNOSIS — O99.213 OBESITY AFFECTING PREGNANCY IN THIRD TRIMESTER: ICD-10-CM

## 2021-06-24 DIAGNOSIS — Z34.03 ENCOUNTER FOR SUPERVISION OF NORMAL FIRST PREGNANCY IN THIRD TRIMESTER: Primary | ICD-10-CM

## 2021-06-24 DIAGNOSIS — O12.13 PROTEINURIA AFFECTING PREGNANCY IN THIRD TRIMESTER: ICD-10-CM

## 2021-06-24 LAB
ALBUMIN SERPL BCP-MCNC: 2.1 G/DL (ref 3.5–5)
ALP SERPL-CCNC: 203 U/L (ref 46–116)
ALT SERPL W P-5'-P-CCNC: 17 U/L (ref 12–78)
ANION GAP SERPL CALCULATED.3IONS-SCNC: 8 MMOL/L (ref 4–13)
AST SERPL W P-5'-P-CCNC: 17 U/L (ref 5–45)
BASOPHILS # BLD AUTO: 0.02 THOUSANDS/ΜL (ref 0–0.1)
BASOPHILS NFR BLD AUTO: 0 % (ref 0–1)
BILIRUB SERPL-MCNC: 0.31 MG/DL (ref 0.2–1)
BUN SERPL-MCNC: 10 MG/DL (ref 5–25)
CALCIUM ALBUM COR SERPL-MCNC: 10.1 MG/DL (ref 8.3–10.1)
CALCIUM SERPL-MCNC: 8.6 MG/DL (ref 8.3–10.1)
CHLORIDE SERPL-SCNC: 107 MMOL/L (ref 100–108)
CO2 SERPL-SCNC: 23 MMOL/L (ref 21–32)
CREAT SERPL-MCNC: 0.67 MG/DL (ref 0.6–1.3)
EOSINOPHIL # BLD AUTO: 0.06 THOUSAND/ΜL (ref 0–0.61)
EOSINOPHIL NFR BLD AUTO: 1 % (ref 0–6)
ERYTHROCYTE [DISTWIDTH] IN BLOOD BY AUTOMATED COUNT: 14.3 % (ref 11.6–15.1)
GFR SERPL CREATININE-BSD FRML MDRD: 122 ML/MIN/1.73SQ M
GLUCOSE P FAST SERPL-MCNC: 107 MG/DL (ref 65–99)
HCT VFR BLD AUTO: 39.4 % (ref 34.8–46.1)
HGB BLD-MCNC: 13.2 G/DL (ref 11.5–15.4)
IMM GRANULOCYTES # BLD AUTO: 0.06 THOUSAND/UL (ref 0–0.2)
IMM GRANULOCYTES NFR BLD AUTO: 1 % (ref 0–2)
LYMPHOCYTES # BLD AUTO: 2.21 THOUSANDS/ΜL (ref 0.6–4.47)
LYMPHOCYTES NFR BLD AUTO: 23 % (ref 14–44)
MCH RBC QN AUTO: 30.2 PG (ref 26.8–34.3)
MCHC RBC AUTO-ENTMCNC: 33.5 G/DL (ref 31.4–37.4)
MCV RBC AUTO: 90 FL (ref 82–98)
MONOCYTES # BLD AUTO: 0.67 THOUSAND/ΜL (ref 0.17–1.22)
MONOCYTES NFR BLD AUTO: 7 % (ref 4–12)
NEUTROPHILS # BLD AUTO: 6.66 THOUSANDS/ΜL (ref 1.85–7.62)
NEUTS SEG NFR BLD AUTO: 68 % (ref 43–75)
NRBC BLD AUTO-RTO: 0 /100 WBCS
PLATELET # BLD AUTO: 165 THOUSANDS/UL (ref 149–390)
PMV BLD AUTO: 13.7 FL (ref 8.9–12.7)
POTASSIUM SERPL-SCNC: 3.8 MMOL/L (ref 3.5–5.3)
PROT SERPL-MCNC: 5.5 G/DL (ref 6.4–8.2)
RBC # BLD AUTO: 4.37 MILLION/UL (ref 3.81–5.12)
SL AMB  POCT GLUCOSE, UA: ABNORMAL
SL AMB POCT URINE PROTEIN: 4
SODIUM SERPL-SCNC: 138 MMOL/L (ref 136–145)
WBC # BLD AUTO: 9.68 THOUSAND/UL (ref 4.31–10.16)

## 2021-06-24 PROCEDURE — PNV: Performed by: OBSTETRICS & GYNECOLOGY

## 2021-06-24 PROCEDURE — 85025 COMPLETE CBC W/AUTO DIFF WBC: CPT

## 2021-06-24 PROCEDURE — 87150 DNA/RNA AMPLIFIED PROBE: CPT | Performed by: OBSTETRICS & GYNECOLOGY

## 2021-06-24 PROCEDURE — 36415 COLL VENOUS BLD VENIPUNCTURE: CPT

## 2021-06-24 PROCEDURE — 80053 COMPREHEN METABOLIC PANEL: CPT

## 2021-06-24 NOTE — PROGRESS NOTES
[facility] presents for routine PN visit  Has had increase in edema of LE  TWG 40#  Significant proteinuria last two visits with elevated P:C, normotensive as of now, had PreE labs 6/10 WNL otherwise - will repeat today  Declines growth scan, but discussed  surveillance with NST/JIN - she is agreeable to this and will schedule, especially in light of proteinuria - will need to watch closely  Precautions reviewed  GBS today  RTO 1 week

## 2021-06-24 NOTE — PROGRESS NOTES
Patient presents for a routine prenatal visit  37W4D  GFM  No LOF or VB   Patient c/o lots of cramping / tighening in lower abdomin at nighttime, it started this week and has been getting stronger everyday  GBS swap done today

## 2021-06-24 NOTE — TELEPHONE ENCOUNTER
Labs are without concern for preeclampsia at this time  Would just review preeclampsia precautions  She has appt next week already

## 2021-06-24 NOTE — TELEPHONE ENCOUNTER
Called pt- informed of Dr Taty Ybarra response & reviewed preeclampsia symptoms/precautions at this time  - advised to call office with any of these symptoms, or any concerns  - pt verbalized understanding  Pt has an appt next week

## 2021-06-25 ENCOUNTER — ULTRASOUND (OUTPATIENT)
Dept: PERINATAL CARE | Facility: CLINIC | Age: 26
End: 2021-06-25
Payer: COMMERCIAL

## 2021-06-25 ENCOUNTER — ANESTHESIA (INPATIENT)
Dept: ANESTHESIOLOGY | Facility: HOSPITAL | Age: 26
End: 2021-06-25
Payer: COMMERCIAL

## 2021-06-25 ENCOUNTER — ANESTHESIA EVENT (INPATIENT)
Dept: ANESTHESIOLOGY | Facility: HOSPITAL | Age: 26
End: 2021-06-25
Payer: COMMERCIAL

## 2021-06-25 ENCOUNTER — HOSPITAL ENCOUNTER (INPATIENT)
Facility: HOSPITAL | Age: 26
LOS: 3 days | Discharge: HOME | End: 2021-06-28
Attending: STUDENT IN AN ORGANIZED HEALTH CARE EDUCATION/TRAINING PROGRAM | Admitting: STUDENT IN AN ORGANIZED HEALTH CARE EDUCATION/TRAINING PROGRAM
Payer: COMMERCIAL

## 2021-06-25 DIAGNOSIS — O14.93 PRE-ECLAMPSIA IN THIRD TRIMESTER: Primary | ICD-10-CM

## 2021-06-25 DIAGNOSIS — Z3A.37 37 WEEKS GESTATION OF PREGNANCY: ICD-10-CM

## 2021-06-25 DIAGNOSIS — O14.93 PRE-ECLAMPSIA IN THIRD TRIMESTER: ICD-10-CM

## 2021-06-25 LAB
ABO GROUP BLD: NORMAL
ALBUMIN SERPL BCP-MCNC: 2 G/DL (ref 3.5–5)
ALP SERPL-CCNC: 212 U/L (ref 46–116)
ALT SERPL W P-5'-P-CCNC: 17 U/L (ref 12–78)
ANION GAP SERPL CALCULATED.3IONS-SCNC: 13 MMOL/L (ref 4–13)
AST SERPL W P-5'-P-CCNC: 16 U/L (ref 5–45)
BILIRUB SERPL-MCNC: 0.28 MG/DL (ref 0.2–1)
BLD GP AB SCN SERPL QL: NEGATIVE
BUN SERPL-MCNC: 14 MG/DL (ref 5–25)
CALCIUM ALBUM COR SERPL-MCNC: 10.1 MG/DL (ref 8.3–10.1)
CALCIUM SERPL-MCNC: 8.5 MG/DL (ref 8.3–10.1)
CHLORIDE SERPL-SCNC: 104 MMOL/L (ref 100–108)
CO2 SERPL-SCNC: 21 MMOL/L (ref 21–32)
CREAT SERPL-MCNC: 0.73 MG/DL (ref 0.6–1.3)
ERYTHROCYTE [DISTWIDTH] IN BLOOD BY AUTOMATED COUNT: 14.2 % (ref 11.6–15.1)
ERYTHROCYTE [DISTWIDTH] IN BLOOD BY AUTOMATED COUNT: 14.3 % (ref 11.6–15.1)
GFR SERPL CREATININE-BSD FRML MDRD: 114 ML/MIN/1.73SQ M
GLUCOSE SERPL-MCNC: 89 MG/DL (ref 65–140)
HCT VFR BLD AUTO: 38.5 % (ref 34.8–46.1)
HCT VFR BLD AUTO: 40.7 % (ref 34.8–46.1)
HGB BLD-MCNC: 12.9 G/DL (ref 11.5–15.4)
HGB BLD-MCNC: 13.6 G/DL (ref 11.5–15.4)
MCH RBC QN AUTO: 29.8 PG (ref 26.8–34.3)
MCH RBC QN AUTO: 29.9 PG (ref 26.8–34.3)
MCHC RBC AUTO-ENTMCNC: 33.4 G/DL (ref 31.4–37.4)
MCHC RBC AUTO-ENTMCNC: 33.5 G/DL (ref 31.4–37.4)
MCV RBC AUTO: 89 FL (ref 82–98)
MCV RBC AUTO: 89 FL (ref 82–98)
PLATELET # BLD AUTO: 159 THOUSANDS/UL (ref 149–390)
PLATELET # BLD AUTO: 163 THOUSANDS/UL (ref 149–390)
PMV BLD AUTO: 13.1 FL (ref 8.9–12.7)
PMV BLD AUTO: 13.5 FL (ref 8.9–12.7)
POTASSIUM SERPL-SCNC: 3.9 MMOL/L (ref 3.5–5.3)
PROT SERPL-MCNC: 5.7 G/DL (ref 6.4–8.2)
RBC # BLD AUTO: 4.32 MILLION/UL (ref 3.81–5.12)
RBC # BLD AUTO: 4.56 MILLION/UL (ref 3.81–5.12)
RH BLD: POSITIVE
SODIUM SERPL-SCNC: 138 MMOL/L (ref 136–145)
SPECIMEN EXPIRATION DATE: NORMAL
WBC # BLD AUTO: 10.35 THOUSAND/UL (ref 4.31–10.16)
WBC # BLD AUTO: 11.14 THOUSAND/UL (ref 4.31–10.16)

## 2021-06-25 PROCEDURE — NC001 PR NO CHARGE: Performed by: STUDENT IN AN ORGANIZED HEALTH CARE EDUCATION/TRAINING PROGRAM

## 2021-06-25 PROCEDURE — 80053 COMPREHEN METABOLIC PANEL: CPT | Performed by: OBSTETRICS & GYNECOLOGY

## 2021-06-25 PROCEDURE — 99214 OFFICE O/P EST MOD 30 MIN: CPT

## 2021-06-25 PROCEDURE — 0HQ9XZZ REPAIR PERINEUM SKIN, EXTERNAL APPROACH: ICD-10-PCS | Performed by: OBSTETRICS & GYNECOLOGY

## 2021-06-25 PROCEDURE — 86901 BLOOD TYPING SEROLOGIC RH(D): CPT | Performed by: OBSTETRICS & GYNECOLOGY

## 2021-06-25 PROCEDURE — 3E033VJ INTRODUCTION OF OTHER HORMONE INTO PERIPHERAL VEIN, PERCUTANEOUS APPROACH: ICD-10-PCS | Performed by: OBSTETRICS & GYNECOLOGY

## 2021-06-25 PROCEDURE — 85027 COMPLETE CBC AUTOMATED: CPT | Performed by: OBSTETRICS & GYNECOLOGY

## 2021-06-25 PROCEDURE — 99214 OFFICE O/P EST MOD 30 MIN: CPT | Performed by: OBSTETRICS & GYNECOLOGY

## 2021-06-25 PROCEDURE — 86900 BLOOD TYPING SEROLOGIC ABO: CPT | Performed by: OBSTETRICS & GYNECOLOGY

## 2021-06-25 PROCEDURE — 59025 FETAL NON-STRESS TEST: CPT | Performed by: OBSTETRICS & GYNECOLOGY

## 2021-06-25 PROCEDURE — 86592 SYPHILIS TEST NON-TREP QUAL: CPT | Performed by: OBSTETRICS & GYNECOLOGY

## 2021-06-25 PROCEDURE — 10H07YZ INSERTION OF OTHER DEVICE INTO PRODUCTS OF CONCEPTION, VIA NATURAL OR ARTIFICIAL OPENING: ICD-10-PCS | Performed by: OBSTETRICS & GYNECOLOGY

## 2021-06-25 PROCEDURE — 76815 OB US LIMITED FETUS(S): CPT | Performed by: OBSTETRICS & GYNECOLOGY

## 2021-06-25 PROCEDURE — 86850 RBC ANTIBODY SCREEN: CPT | Performed by: OBSTETRICS & GYNECOLOGY

## 2021-06-25 PROCEDURE — 10H073Z INSERTION OF MONITORING ELECTRODE INTO PRODUCTS OF CONCEPTION, VIA NATURAL OR ARTIFICIAL OPENING: ICD-10-PCS | Performed by: OBSTETRICS & GYNECOLOGY

## 2021-06-25 PROCEDURE — 4A1H7FZ MONITORING OF PRODUCTS OF CONCEPTION, CARDIAC RHYTHM, VIA NATURAL OR ARTIFICIAL OPENING: ICD-10-PCS | Performed by: OBSTETRICS & GYNECOLOGY

## 2021-06-25 RX ORDER — SODIUM CHLORIDE, SODIUM LACTATE, POTASSIUM CHLORIDE, CALCIUM CHLORIDE 600; 310; 30; 20 MG/100ML; MG/100ML; MG/100ML; MG/100ML
125 INJECTION, SOLUTION INTRAVENOUS CONTINUOUS
Status: DISCONTINUED | OUTPATIENT
Start: 2021-06-25 | End: 2021-06-28 | Stop reason: HOSPADM

## 2021-06-25 RX ORDER — ALBUTEROL SULFATE 90 UG/1
2 AEROSOL, METERED RESPIRATORY (INHALATION) EVERY 6 HOURS PRN
Status: DISCONTINUED | OUTPATIENT
Start: 2021-06-25 | End: 2021-06-28 | Stop reason: HOSPADM

## 2021-06-25 RX ORDER — ONDANSETRON 2 MG/ML
4 INJECTION INTRAMUSCULAR; INTRAVENOUS EVERY 6 HOURS PRN
Status: DISCONTINUED | OUTPATIENT
Start: 2021-06-25 | End: 2021-06-26

## 2021-06-25 RX ADMIN — MISOPROSTOL 25 MCG: 100 TABLET ORAL at 19:10

## 2021-06-25 RX ADMIN — MISOPROSTOL 50 MCG: 100 TABLET ORAL at 14:46

## 2021-06-25 NOTE — PROGRESS NOTES
Nona was seen for  surveillance for the indication maternal BMI greater than 40  Her blood pressure was elevated today on multiple occasions  She had recent blood work which demonstrated proteinuria  She overall feels well without complaints  I discussed with her based upon her diagnosis of preeclampsia and her gestational age of 42 weeks and 5 days that I would recommend delivery at this time  I notified Labor and delivery and communicated to the patient this recommendation  Patient will go to Labor and delivery for further evaluation and management including delivery  Thank you very much for allowing us to participate in the care of this very nice patient  Should you have any questions, please do not hesitate to contact our office  Please note, medical decision making complexity:  Moderate

## 2021-06-25 NOTE — PLAN OF CARE
Problem: Knowledge Deficit  Goal: Verbalizes understanding of labor plan  Description: Assess patient/family/caregiver's baseline knowledge level and ability to understand information  Provide education via patient/family/caregiver's preferred learning method at appropriate level of understanding  1  Provide teaching at level of understanding  2  Provide teaching via preferred learning method(s)  Outcome: Progressing  Goal: Patient/family/caregiver demonstrates understanding of disease process, treatment plan, medications, and discharge instructions  Description: Complete learning assessment and assess knowledge base  Interventions:  - Provide teaching at level of understanding  - Provide teaching via preferred learning methods  Outcome: Progressing     Problem: Labor & Delivery  Goal: Manages discomfort  Description: Assess and monitor for signs and symptoms of discomfort  Assess patient's pain level regularly and per hospital policy  Administer medications as ordered  Support use of nonpharmacological methods to help control pain such as distraction, imagery, relaxation, and application of heat and cold  Collaborate with interdisciplinary team and patient to determine appropriate pain management plan  1  Include patient in decisions related to comfort  2  Offer non-pharmacological pain management interventions  3  Report ineffective pain management to physician  Outcome: Progressing  Goal: Patient vital signs are stable  Description: 1  Assess vital signs - vaginal delivery    Outcome: Progressing     Problem: PAIN - ADULT  Goal: Verbalizes/displays adequate comfort level or baseline comfort level  Description: Interventions:  - Encourage patient to monitor pain and request assistance  - Assess pain using appropriate pain scale  - Administer analgesics based on type and severity of pain and evaluate response  - Implement non-pharmacological measures as appropriate and evaluate response  - Consider cultural and social influences on pain and pain management  - Notify physician/advanced practitioner if interventions unsuccessful or patient reports new pain  Outcome: Progressing     Problem: INFECTION - ADULT  Goal: Absence or prevention of progression during hospitalization  Description: INTERVENTIONS:  - Assess and monitor for signs and symptoms of infection  - Monitor lab/diagnostic results  - Monitor all insertion sites, i e  indwelling lines, tubes, and drains  - Monitor endotracheal if appropriate and nasal secretions for changes in amount and color  - [place] appropriate cooling/warming therapies per order  - Administer medications as ordered  - Instruct and encourage patient and family to use good hand hygiene technique  - Identify and instruct in appropriate isolation precautions for identified infection/condition  Outcome: Progressing  Goal: Absence of fever/infection during neutropenic period  Description: INTERVENTIONS:  - Monitor WBC    Outcome: Progressing     Problem: SAFETY ADULT  Goal: Patient will remain free of falls  Description: INTERVENTIONS:  - Educate patient/family on patient safety including physical limitations  - Instruct patient to call for assistance with activity   - Consult OT/PT to assist with strengthening/mobility   - Keep Call bell within reach  - Keep bed low and locked with side rails adjusted as appropriate  - Keep care items and personal belongings within reach  - Initiate and maintain comfort rounds  - Make Fall Risk Sign visible too  - Apply yellow socks and bracelet for high fall risk patients  - Consider moving patient to room near nurses station  Outcome: Progressing  Goal: Maintain or return to baseline ADL function  Description: INTERVENTIONS:  -  Assess patient's ability to carry out ADLs; assess patient's baseline for ADL function and identify physical deficits which impact ability to perform ADLs (bathing, care of mouth/teeth, toileting, grooming, dressing, etc )  - Assess/evaluate cause of self-care deficits   - Assess range of motion  - Assess patient's mobility; develop plan if impaired  - Assess patient's need for assistive devices and provide as appropriate  - Encourage maximum independence but intervene and supervise when necessary  - Involve family in performance of ADLs  - Assess for home care needs following discharge   - Consider OT consult to assist with ADL evaluation and planning for discharge  - Provide patient education as appropriate  Outcome: Progressing  Goal: Maintains/Returns to pre admission functional level  Description: INTERVENTIONS:  - Perform BMAT or MOVE assessment daily    - Set and communicate daily mobility goal to care team and patient/family/caregiver     - Collaborate with rehabilitation services on mobility goals if consulted  - Out of bed for toileting  - Record patient progress and toleration of activity level   Outcome: Progressing     Problem: DISCHARGE PLANNING  Goal: Discharge to home or other facility with appropriate resources  Description: INTERVENTIONS:  - Identify barriers to discharge w/patient and caregiver  - Arrange for needed discharge resources and transportation as appropriate  - Identify discharge learning needs (meds, wound care, etc )  - Arrange for interpretive services to assist at discharge as needed  - Refer to Case Management Department for coordinating discharge planning if the patient needs post-hospital services based on physician/advanced practitioner order or complex needs related to functional status, cognitive ability, or social support system  Outcome: Progressing

## 2021-06-25 NOTE — H&P
[facility] 31 y o  female MRN: [id number]  Unit/Bed#: LD TRIAGE  Encounter: [id number]      Assessment:  31 y o  Negin White at 37w5d admitted for preeclampsia without severe features (P:c 0 42) and an induction of labor  EFW: 21%, 6 lbs  VTX by transabdominal ultrasound   SVE: Cervical Dilation: Closed  Cervical Effacement: 48  Fetal Station: -3  Presentation: Vertex  Method: Manual  OB Examiner: Gutierrez      Plan:   Admit  Follow up CBC, RPR, Blood Type  Asthma- continue albuterol inhaler PRN  Induction method: Blanca/Cytotec  GBS status in process: PCN for prophylaxis not indicated as she is term and no prior hx of GBS  Analgesia and/or epidural at patient request  Anticipate     Patient of: Tone Mendoza  This patient will be an INPATIENT  and I certify the anticipated length of stay is >2 Midnights  Discussed with Dr Nikki Gresham:    Chief Complaint: sent over from  [facility] for induction     HPI: Mary Muller is a 31 y o  Negin White with an MARIBEL of 2021, by Last Menstrual Period at 37w5d who is being admitted for extended fetal monitoring  She denies having uterine contractions, has no LOF, and reports no VB  She states she has felt good FM  Her blood pressure was elevated today on multiple occasions at the [facility] , she was on the NST/JNI for the indication of maternal BMI greater than 40  Given her a new diagnosis of preeclampsia she was sent over from the  Center for induction  Pregnancy complications:     Patient Active Problem List   Diagnosis    Encounter for supervision of normal first pregnancy in second trimester    Asthma    Obesity complicating pregnancy, third trimester    Pre-eclampsia in third trimester    37 weeks gestation of pregnancy       Baby complications/comments: none    Review of Systems   All other systems reviewed and are negative        OB History    Para Term  AB Living   1 0 0 0 0 0   SAB TAB Ectopic Multiple Live Births   0 0 0 0 0      # Outcome Date GA Lbr Matt/2nd Weight Sex Delivery Anes PTL Lv   1 Current                Past Medical History:   Diagnosis Date    Asthma     Seasonal allergies     Strabismus     corrected in 2005       Past Surgical History:   Procedure Laterality Date    EYE SURGERY  2005    strabismus correction     WISDOM TOOTH EXTRACTION  2008       Social History     Tobacco Use    Smoking status: Never Smoker    Smokeless tobacco: Never Used   Substance Use Topics    Alcohol use: Not Currently     Comment: Social       Allergies   Allergen Reactions    Rony Flavor [Flavoring Agent] Throat Swelling       Medications Prior to Admission   Medication    aspirin 81 mg chewable tablet    loratadine (Claritin) 10 mg tablet    Prenatal MV-Min-Fe Fum-FA-DHA (PRENATAL+DHA PO)    albuterol (PROVENTIL HFA,VENTOLIN HFA) 90 mcg/act inhaler           OBJECTIVE:  Vitals:  Temp:  [97 9 °F (36 6 °C)] 97 9 °F (36 6 °C)  HR:  [76-98] 86  Resp:  [18] 18  BP: (134-150)/(76-98) 134/76  Body mass index is 41 17 kg/m²       Physical Exam:  Constitutional: AAOx3  CV: +S1, +S2, no murmurs appreciated  Resp: No respiratory distress  Abdominal: Gravid uterus  Psychiatric: Behavioral normal    FHT:  Baseline Rate: 135 bpm  Variability: Moderate 6-25 bpm  Accelerations: 15 x 15 or greater, At variable times  Decelerations: None    TOCO:   Contraction Frequency (minutes): irregular, irritability   Contraction Duration (seconds): 40-50  Contraction Quality: Mild      Lab Results   Component Value Date    WBC 10 35 (H) 06/25/2021    HGB 13 6 06/25/2021    HCT 40 7 06/25/2021     06/25/2021     Lab Results   Component Value Date    K 3 8 06/24/2021     06/24/2021    CO2 23 06/24/2021    BUN 10 06/24/2021    CREATININE 0 67 06/24/2021    AST 17 06/24/2021    ALT 17 06/24/2021       Prenatal Labs:   Blood Type:   Lab Results   Component Value Date/Time    ABO Grouping A 06/25/2021 02:25 PM   HCT/HGB:   Lab Results   Component Value Date/Time    Hematocrit 40 7 06/25/2021 02:25 PM    Hemoglobin 13 6 06/25/2021 02:25 PM      , Platelets:   Lab Results   Component Value Date/Time    Platelets 265 93/94/7041 02:25 PM      , 1 hour Glucola:   Lab Results   Component Value Date/Time    Glucose 85 04/15/2021 12:58 PM      Rubella: Immune   VDRL/RPR:   Lab Results   Component Value Date/Time    RPR NON-REACTIVE 04/15/2021 12:58 PM      , Hep B: No results found for: HEPBSAG     HIV:   Lab Results   Component Value Date/Time    HIV AG/AB, 4th Gen NON-REACTIVE 01/06/2021 03:02 PM     , Group B Strep:  Pending    Emma Manning DO  OB/GYN PGY-1  6/25/2021  4:29 PM        Portions of the record may have been created with voice recognition software  Occasional wrong word or "sound a like" substitutions may have occurred due to the inherent limitations of voice recognition software    Read the chart carefully and recognize, using context, where substitutions have occurred

## 2021-06-25 NOTE — PLAN OF CARE
Problem: Knowledge Deficit  Goal: Verbalizes understanding of labor plan  Description: Assess patient/family/caregiver's baseline knowledge level and ability to understand information  Provide education via patient/family/caregiver's preferred learning method at appropriate level of understanding  1  Provide teaching at level of understanding  2  Provide teaching via preferred learning method(s)  Outcome: Progressing  Goal: Patient/family/caregiver demonstrates understanding of disease process, treatment plan, medications, and discharge instructions  Description: Complete learning assessment and assess knowledge base  Interventions:  - Provide teaching at level of understanding  - Provide teaching via preferred learning methods  Outcome: Progressing     Problem: Labor & Delivery  Goal: Manages discomfort  Description: Assess and monitor for signs and symptoms of discomfort  Assess patient's pain level regularly and per hospital policy  Administer medications as ordered  Support use of nonpharmacological methods to help control pain such as distraction, imagery, relaxation, and application of heat and cold  Collaborate with interdisciplinary team and patient to determine appropriate pain management plan  1  Include patient in decisions related to comfort  2  Offer non-pharmacological pain management interventions  3  Report ineffective pain management to physician  Outcome: Progressing  Goal: Patient vital signs are stable  Description: 1  Assess vital signs - vaginal delivery    Outcome: Progressing     Problem: PAIN - ADULT  Goal: Verbalizes/displays adequate comfort level or baseline comfort level  Description: Interventions:  - Encourage patient to monitor pain and request assistance  - Assess pain using appropriate pain scale  - Administer analgesics based on type and severity of pain and evaluate response  - Implement non-pharmacological measures as appropriate and evaluate response  - Consider cultural and social influences on pain and pain management  - Notify physician/advanced practitioner if interventions unsuccessful or patient reports new pain  Outcome: Progressing     Problem: INFECTION - ADULT  Goal: Absence or prevention of progression during hospitalization  Description: INTERVENTIONS:  - Assess and monitor for signs and symptoms of infection  - Monitor lab/diagnostic results  - Monitor all insertion sites, i e  indwelling lines, tubes, and drains  - Monitor endotracheal if appropriate and nasal secretions for changes in amount and color  - [place] appropriate cooling/warming therapies per order  - Administer medications as ordered  - Instruct and encourage patient and family to use good hand hygiene technique  - Identify and instruct in appropriate isolation precautions for identified infection/condition  Outcome: Progressing  Goal: Absence of fever/infection during neutropenic period  Description: INTERVENTIONS:  - Monitor WBC    Outcome: Progressing     Problem: SAFETY ADULT  Goal: Patient will remain free of falls  Description: INTERVENTIONS:  - Educate patient/family on patient safety including physical limitations  - Instruct patient to call for assistance with activity   - Consult OT/PT to assist with strengthening/mobility   - Keep Call bell within reach  - Keep bed low and locked with side rails adjusted as appropriate  - Keep care items and personal belongings within reach  - Initiate and maintain comfort rounds  - Make Fall Risk Sign visible to staff  - Offer Toileting every Hours, in advance of need  - Initiate/Maintain alarm  - Obtain necessary fall risk management equipment:   - Apply yellow socks and bracelet for high fall risk patients  - Consider moving patient to room near nurses station  Outcome: Progressing  Goal: Maintain or return to baseline ADL function  Description: INTERVENTIONS:  -  Assess patient's ability to carry out ADLs; assess patient's baseline for ADL function and identify physical deficits which impact ability to perform ADLs (bathing, care of mouth/teeth, toileting, grooming, dressing, etc )  - Assess/evaluate cause of self-care deficits   - Assess range of motion  - Assess patient's mobility; develop plan if impaired  - Assess patient's need for assistive devices and provide as appropriate  - Encourage maximum independence but intervene and supervise when necessary  - Involve family in performance of ADLs  - Assess for home care needs following discharge   - Consider OT consult to assist with ADL evaluation and planning for discharge  - Provide patient education as appropriate  Outcome: Progressing  Goal: Maintains/Returns to pre admission functional level  Description: INTERVENTIONS:  - Perform BMAT or MOVE assessment daily    - Set and communicate daily mobility goal to care team and patient/family/caregiver  - Collaborate with rehabilitation services on mobility goals if consulted  - Perform Range of Motion times a day  - Reposition patient every hours    - Dangle patient  times a day  - Stand patient times a day  - Ambulate patient times a day  - Out of bed to chair times a day   - Out of bed for meals times a day  - Out of bed for toileting  - Record patient progress and toleration of activity level   Outcome: Progressing     Problem: DISCHARGE PLANNING  Goal: Discharge to home or other facility with appropriate resources  Description: INTERVENTIONS:  - Identify barriers to discharge w/patient and caregiver  - Arrange for needed discharge resources and transportation as appropriate  - Identify discharge learning needs (meds, wound care, etc )  - Arrange for interpretive services to assist at discharge as needed  - Refer to Case Management Department for coordinating discharge planning if the patient needs post-hospital services based on physician/advanced practitioner order or complex needs related to functional status, cognitive ability, or social support system  Outcome: Progressing

## 2021-06-26 LAB
BASE EXCESS BLDCOA CALC-SCNC: -8.8 MMOL/L (ref 3–11)
BASE EXCESS BLDCOV CALC-SCNC: -7.1 MMOL/L (ref 1–9)
GP B STREP DNA SPEC QL NAA+PROBE: NEGATIVE
HCO3 BLDCOA-SCNC: 18 MMOL/L (ref 17.3–27.3)
HCO3 BLDCOV-SCNC: 19.5 MMOL/L (ref 12.2–28.6)
O2 CT VFR BLDCOA CALC: 4.7 ML/DL
OXYHGB MFR BLDCOA: 24.1 %
OXYHGB MFR BLDCOV: 36.4 %
PCO2 BLDCOA: 41.6 MM[HG] (ref 30–60)
PCO2 BLDCOV: 43.1 MM HG (ref 27–43)
PH BLDCOA: 7.25 [PH] (ref 7.23–7.43)
PH BLDCOV: 7.27 [PH] (ref 7.19–7.49)
PO2 BLDCOA: 15.9 MM HG (ref 5–25)
PO2 BLDCOV: 18.5 MM HG (ref 15–45)
SAO2 % BLDCOV: 7.3 ML/DL

## 2021-06-26 PROCEDURE — 88307 TISSUE EXAM BY PATHOLOGIST: CPT | Performed by: PATHOLOGY

## 2021-06-26 PROCEDURE — 82805 BLOOD GASES W/O2 SATURATION: CPT | Performed by: STUDENT IN AN ORGANIZED HEALTH CARE EDUCATION/TRAINING PROGRAM

## 2021-06-26 PROCEDURE — 59400 OBSTETRICAL CARE: CPT | Performed by: OBSTETRICS & GYNECOLOGY

## 2021-06-26 RX ORDER — OXYTOCIN/RINGER'S LACTATE 30/500 ML
250 PLASTIC BAG, INJECTION (ML) INTRAVENOUS ONCE
Status: DISCONTINUED | OUTPATIENT
Start: 2021-06-26 | End: 2021-06-28 | Stop reason: HOSPADM

## 2021-06-26 RX ORDER — SIMETHICONE 80 MG
80 TABLET,CHEWABLE ORAL 4 TIMES DAILY PRN
Status: DISCONTINUED | OUTPATIENT
Start: 2021-06-26 | End: 2021-06-28 | Stop reason: HOSPADM

## 2021-06-26 RX ORDER — OXYTOCIN/RINGER'S LACTATE 30/500 ML
1-30 PLASTIC BAG, INJECTION (ML) INTRAVENOUS
Status: DISCONTINUED | OUTPATIENT
Start: 2021-06-26 | End: 2021-06-26

## 2021-06-26 RX ORDER — ROPIVACAINE HYDROCHLORIDE 2 MG/ML
INJECTION, SOLUTION EPIDURAL; INFILTRATION; PERINEURAL AS NEEDED
Status: DISCONTINUED | OUTPATIENT
Start: 2021-06-26 | End: 2021-06-26 | Stop reason: HOSPADM

## 2021-06-26 RX ORDER — BUTORPHANOL TARTRATE 1 MG/ML
1 INJECTION, SOLUTION INTRAMUSCULAR; INTRAVENOUS ONCE
Status: COMPLETED | OUTPATIENT
Start: 2021-06-26 | End: 2021-06-26

## 2021-06-26 RX ORDER — CALCIUM CARBONATE 200(500)MG
1000 TABLET,CHEWABLE ORAL DAILY PRN
Status: DISCONTINUED | OUTPATIENT
Start: 2021-06-26 | End: 2021-06-28 | Stop reason: HOSPADM

## 2021-06-26 RX ORDER — DIAPER,BRIEF,INFANT-TODD,DISP
1 EACH MISCELLANEOUS 4 TIMES DAILY PRN
Status: DISCONTINUED | OUTPATIENT
Start: 2021-06-26 | End: 2021-06-28 | Stop reason: HOSPADM

## 2021-06-26 RX ORDER — ACETAMINOPHEN 325 MG/1
650 TABLET ORAL EVERY 4 HOURS PRN
Status: DISCONTINUED | OUTPATIENT
Start: 2021-06-26 | End: 2021-06-28 | Stop reason: HOSPADM

## 2021-06-26 RX ORDER — ONDANSETRON 2 MG/ML
4 INJECTION INTRAMUSCULAR; INTRAVENOUS EVERY 8 HOURS PRN
Status: DISCONTINUED | OUTPATIENT
Start: 2021-06-26 | End: 2021-06-28 | Stop reason: HOSPADM

## 2021-06-26 RX ORDER — IBUPROFEN 600 MG/1
600 TABLET ORAL EVERY 6 HOURS PRN
Status: DISCONTINUED | OUTPATIENT
Start: 2021-06-27 | End: 2021-06-28 | Stop reason: HOSPADM

## 2021-06-26 RX ORDER — SENNOSIDES 8.6 MG
1 TABLET ORAL
Status: DISCONTINUED | OUTPATIENT
Start: 2021-06-26 | End: 2021-06-28 | Stop reason: HOSPADM

## 2021-06-26 RX ORDER — LIDOCAINE HYDROCHLORIDE AND EPINEPHRINE 15; 5 MG/ML; UG/ML
INJECTION, SOLUTION EPIDURAL
Status: COMPLETED | OUTPATIENT
Start: 2021-06-26 | End: 2021-06-26

## 2021-06-26 RX ORDER — DOCUSATE SODIUM 100 MG/1
100 CAPSULE, LIQUID FILLED ORAL 2 TIMES DAILY
Status: DISCONTINUED | OUTPATIENT
Start: 2021-06-26 | End: 2021-06-28 | Stop reason: HOSPADM

## 2021-06-26 RX ADMIN — WITCH HAZEL 1 PAD: 500 SOLUTION RECTAL; TOPICAL at 17:12

## 2021-06-26 RX ADMIN — SODIUM CHLORIDE, SODIUM LACTATE, POTASSIUM CHLORIDE, AND CALCIUM CHLORIDE 125 ML/HR: .6; .31; .03; .02 INJECTION, SOLUTION INTRAVENOUS at 09:30

## 2021-06-26 RX ADMIN — BENZOCAINE AND LEVOMENTHOL: 200; 5 SPRAY TOPICAL at 17:12

## 2021-06-26 RX ADMIN — ROPIVACAINE HYDROCHLORIDE: 2 INJECTION, SOLUTION EPIDURAL; INFILTRATION at 04:30

## 2021-06-26 RX ADMIN — SODIUM CHLORIDE, SODIUM LACTATE, POTASSIUM CHLORIDE, AND CALCIUM CHLORIDE 999 ML/HR: .6; .31; .03; .02 INJECTION, SOLUTION INTRAVENOUS at 00:15

## 2021-06-26 RX ADMIN — LIDOCAINE HYDROCHLORIDE AND EPINEPHRINE 3 ML: 15; 5 INJECTION, SOLUTION EPIDURAL at 04:19

## 2021-06-26 RX ADMIN — ROPIVACAINE HYDROCHLORIDE 7 ML: 2 INJECTION, SOLUTION EPIDURAL; INFILTRATION at 06:59

## 2021-06-26 RX ADMIN — DOCUSATE SODIUM 100 MG: 100 CAPSULE, LIQUID FILLED ORAL at 17:12

## 2021-06-26 RX ADMIN — Medication 2 MILLI-UNITS/MIN: at 01:01

## 2021-06-26 RX ADMIN — ROPIVACAINE HYDROCHLORIDE: 2 INJECTION, SOLUTION EPIDURAL; INFILTRATION at 11:43

## 2021-06-26 RX ADMIN — ONDANSETRON 4 MG: 2 INJECTION INTRAMUSCULAR; INTRAVENOUS at 03:21

## 2021-06-26 RX ADMIN — SODIUM CHLORIDE, SODIUM LACTATE, POTASSIUM CHLORIDE, AND CALCIUM CHLORIDE 125 ML/HR: .6; .31; .03; .02 INJECTION, SOLUTION INTRAVENOUS at 05:49

## 2021-06-26 RX ADMIN — SODIUM CHLORIDE, SODIUM LACTATE, POTASSIUM CHLORIDE, AND CALCIUM CHLORIDE 125 ML/HR: .6; .31; .03; .02 INJECTION, SOLUTION INTRAVENOUS at 01:11

## 2021-06-26 RX ADMIN — LIDOCAINE HYDROCHLORIDE AND EPINEPHRINE 2 ML: 15; 5 INJECTION, SOLUTION EPIDURAL at 04:22

## 2021-06-26 RX ADMIN — BUTORPHANOL TARTRATE 1 MG: 1 INJECTION, SOLUTION INTRAMUSCULAR; INTRAVENOUS at 01:41

## 2021-06-26 RX ADMIN — ACETAMINOPHEN 650 MG: 325 TABLET, FILM COATED ORAL at 17:12

## 2021-06-26 NOTE — OB LABOR/OXYTOCIN SAFETY PROGRESS
Oxytocin Safety Progress Check Note - Vinnie Odonnell 31 y o  female MRN: [id number]    Unit/Bed#: -01 Encounter: [id number]    Dose (tere-units/min) Oxytocin: 0 tere-units/min  Contraction Frequency (minutes): 2-4 5  Contraction Quality: Moderate  Tachysystole: No   Cervical Dilation: 9        Cervical Effacement: 100  Fetal Station: 0  Baseline Rate: 160 bpm  Fetal Heart Rate: 163 BPM  FHR Category: Category II  Oxytocin Safety Progress Check: Safety check completed          Vital Signs:   Vitals:    06/26/21 0730   BP:    Pulse:    Resp: 18   Temp: 99 1 °F (37 3 °C)   SpO2:            Notes/comments: At bedside for continued late decels  IUPC and FSE placed  Discussed potential for 1LTCS if decelerations do not improve  Patient continued to be repositioned, IVF given  Will monitor closely        Rosy Barnes MD 6/26/2021 8:21 AM

## 2021-06-26 NOTE — OB LABOR/OXYTOCIN SAFETY PROGRESS
Oxytocin Safety Progress Check Note - Madeline Taylor 31 y o  female MRN: [id number]    Unit/Bed#: -01 Encounter: [id number]    Dose (tere-units/min) Oxytocin: 6 tere-units/min  Contraction Frequency (minutes): 1-3  Contraction Quality: Moderate, Strong  Tachysystole: No   Cervical Dilation: 3-4  Cervical Effacement: 70  Fetal Station: -2  Baseline Rate: 120 bpm  Fetal Heart Rate: 125 BPM  FHR Category: Category I  Oxytocin Safety Progress Check: Safety check completed      Vital Signs:   Vitals:    06/26/21 0321   BP: 141/86   Pulse: 81   Resp:    Temp:        Notes/comments: requesting epidural  cervical exam as above  Fetal heart tracing category I  Continue Pitocin titration  Dr Bernice Johns alerted via TT         Kasey Oviedo MD 6/26/2021 4:02 AM thick

## 2021-06-26 NOTE — DISCHARGE SUMMARY
Discharge Summary - Brandi Calix 31 y o  female MRN: [id number]    Unit/Bed#: -01 Encounter: [id number]    Admission Date: 2021     Discharge Date: 2021    Diagnoses:   1  Pregnancy at 37w6d  2  Preeclampsia without severe features  3  Obesity  4  Asthma  Discharge Diagnosis:   Same, delivered    Procedures:   spontaneous vaginal delivery    Admitting Attending: Dr Yessy Rivera  Delivery Attending: Dr Ronald Tena MD  Discharge Attending: Dr Gracie Gutierrez Course:   Brandi Calix is a 31 y o  Allie Deems 37w6d   She presented to labor and delivery for induction of labor for preeclampsia without severe features (P:C 0 42)  She received 2 doses of Cytotec, and spontaneously ruptured her membranes for clear fluid  She was started on Pitocin  She progressed to be complete and started pushing  She had variable decelerations with pushing efforts, which always returned to baseline with moderate variability during rest     She delivered a viable female  on 21 at [id number]  Weight 5lbs 1oz via spontaneous vaginal delivery  Apgars were 9 (1 min) and 9 (5 min)   was transferred to the  nursery  Patient tolerated the procedure well and was transferred to recovery in stable condition  Blood pressures remained well controlled in the postpartum period without oral antihypertensives  Her post-partum pain was well controlled with oral analgesics  On day of discharge, she was ambulating and able to reasonably perform all ADLs  She was voiding and had appropriate bowel function  Pain was well controlled  She was discharged home on postpartum day #2 without complications  Patient was instructed to follow up with her OB as an outpatient and was given appropriate warnings to call doctor or provider if she develops signs of infection or uncontrolled pain  On day of discharge she was ambulating, voiding spontaneously, tolerating oral intake and hemodynamically stable  Mom's blood type is A positive and  Rhogam was not given  Disposition: Home    Planned Readmission: No    Discharge Medications:   Prenatal vitamin daily for 6 months or the duration of nursing, whichever is longer  Motrin 600 mg orally every 6 hours as needed for pain  Tylenol (over the counter) per bottle directions as needed for pain  Hydrocortisone cream 1% (over the counter) applied 1-2x daily to perineum as needed  Witch hazel pads for perineal discomfort as needed      Discharge instructions :   -Do not place anything (no partner, tampons or douche) in your vagina for 6 weeks  -You may walk for exercise for the first 6 weeks then gradually return to your usual activities    -Please do not drive for 1 week if you have no stitches and for 2 weeks if you have stitches or underwent a  delivery     -You may take baths or shower per your preference    -Please look at your bust (breasts) in the mirror daily and call your doctor for redness or tenderness or increased warmth  - If you have had a  section please look at your incision daily as well and call provider for increasing redness or steady drainage from the incision    -Please call your doctor's office if temperature > 100 4*F or 38* C, worsening pain or a foul discharge      Follow Up:  - Follow up in 3-6 weeks for postpartum visit    Joice Carrel, MD  OB/GYN PGY-2  9:02 AM  21

## 2021-06-26 NOTE — PLAN OF CARE
Problem: Knowledge Deficit  Goal: Verbalizes understanding of labor plan  Description: Assess patient/family/caregiver's baseline knowledge level and ability to understand information  Provide education via patient/family/caregiver's preferred learning method at appropriate level of understanding  1  Provide teaching at level of understanding  2  Provide teaching via preferred learning method(s)  Outcome: Progressing  Goal: Patient/family/caregiver demonstrates understanding of disease process, treatment plan, medications, and discharge instructions  Description: Complete learning assessment and assess knowledge base  Interventions:  - Provide teaching at level of understanding  - Provide teaching via preferred learning methods  Outcome: Progressing     Problem: Labor & Delivery  Goal: Manages discomfort  Description: Assess and monitor for signs and symptoms of discomfort  Assess patient's pain level regularly and per hospital policy  Administer medications as ordered  Support use of nonpharmacological methods to help control pain such as distraction, imagery, relaxation, and application of heat and cold  Collaborate with interdisciplinary team and patient to determine appropriate pain management plan  1  Include patient in decisions related to comfort  2  Offer non-pharmacological pain management interventions  3  Report ineffective pain management to physician  Outcome: Progressing  Goal: Patient vital signs are stable  Description: 1  Assess vital signs - vaginal delivery    Outcome: Progressing     Problem: PAIN - ADULT  Goal: Verbalizes/displays adequate comfort level or baseline comfort level  Description: Interventions:  - Encourage patient to monitor pain and request assistance  - Assess pain using appropriate pain scale  - Administer analgesics based on type and severity of pain and evaluate response  - Implement non-pharmacological measures as appropriate and evaluate response  - Consider cultural and social influences on pain and pain management  - Notify physician/advanced practitioner if interventions unsuccessful or patient reports new pain  Outcome: Progressing     Problem: INFECTION - ADULT  Goal: Absence or prevention of progression during hospitalization  Description: INTERVENTIONS:  - Assess and monitor for signs and symptoms of infection  - Monitor lab/diagnostic results  - Monitor all insertion sites, i e  indwelling lines, tubes, and drains  - Monitor endotracheal if appropriate and nasal secretions for changes in amount and color  - [place] appropriate cooling/warming therapies per order  - Administer medications as ordered  - Instruct and encourage patient and family to use good hand hygiene technique  - Identify and instruct in appropriate isolation precautions for identified infection/condition  Outcome: Progressing  Goal: Absence of fever/infection during neutropenic period  Description: INTERVENTIONS:  - Monitor WBC    Outcome: Progressing     Problem: SAFETY ADULT  Goal: Patient will remain free of falls  Description: INTERVENTIONS:  - Educate patient/family on patient safety including physical limitations  - Instruct patient to call for assistance with activity   - Consult OT/PT to assist with strengthening/mobility   - Keep Call bell within reach  - Keep bed low and locked with side rails adjusted as appropriate  - Keep care items and personal belongings within reach  - Initiate and maintain comfort rounds  - Make Fall Risk Sign visible to staff  - Offer Toileting ever 2 Hours, in advance of need    - Obtain necessary fall risk management equipment:   - Apply yellow socks and bracelet for high fall risk patients  - Consider moving patient to room near nurses station  Outcome: Progressing  Goal: Maintain or return to baseline ADL function  Description: INTERVENTIONS:  -  Assess patient's ability to carry out ADLs; assess patient's baseline for ADL function and identify physical deficits which impact ability to perform ADLs (bathing, care of mouth/teeth, toileting, grooming, dressing, etc )  - Assess/evaluate cause of self-care deficits   - Assess range of motion  - Assess patient's mobility; develop plan if impaired  - Assess patient's need for assistive devices and provide as appropriate  - Encourage maximum independence but intervene and supervise when necessary  - Involve family in performance of ADLs  - Assess for home care needs following discharge   - Consider OT consult to assist with ADL evaluation and planning for discharge  - Provide patient education as appropriate  Outcome: Progressing  Goal: Maintains/Returns to pre admission functional level  Description: INTERVENTIONS:  - Perform BMAT or MOVE assessment daily    - Set and communicate daily mobility goal to care team and patient/family/caregiver     - Collaborate with rehabilitation services on mobility goals if consulted  - Out of bed for toileting  - Record patient progress and toleration of activity level   Outcome: Progressing     Problem: DISCHARGE PLANNING  Goal: Discharge to home or other facility with appropriate resources  Description: INTERVENTIONS:  - Identify barriers to discharge w/patient and caregiver  - Arrange for needed discharge resources and transportation as appropriate  - Identify discharge learning needs (meds, wound care, etc )  - Arrange for interpretive services to assist at discharge as needed  - Refer to Case Management Department for coordinating discharge planning if the patient needs post-hospital services based on physician/advanced practitioner order or complex needs related to functional status, cognitive ability, or social support system  Outcome: Progressing

## 2021-06-26 NOTE — QUICK NOTE
Late Entry    Patient seen at bedside around 17:20 after assumption of care  Reviewed IOL process and expectations  BPs reviewed  FHT reviewed  All patient and partner questions answered  Anticipate continued cervical ripening followed by pitocin/AROM when able

## 2021-06-26 NOTE — OB LABOR/OXYTOCIN SAFETY PROGRESS
Oxytocin Safety Progress Check Note - Libra Dodge 31 y o  female MRN: [id number]    Unit/Bed#: -01 Encounter: [id number]    Dose (tere-units/min) Oxytocin: 0 tere-units/min  Contraction Frequency (minutes): 3 5-4 5  Contraction Quality: Moderate  Tachysystole: No   Cervical Dilation: Lip/rim (Comment)        Cervical Effacement: 100  Fetal Station: 0  Baseline Rate: 150 bpm  Fetal Heart Rate: 155 BPM  FHR Category: Category II  Oxytocin Safety Progress Check: Safety check completed            Vital Signs:   Vitals:    06/26/21 0900   BP: 137/67   Pulse:    Resp:    Temp:    SpO2:            Notes/comments:   FHT now cat 1 - MVU not adequate at this time  Will restart pitocin at 1  Discussed with patient          Michael Jo MD 6/26/2021 9:45 AM

## 2021-06-26 NOTE — ANESTHESIA PREPROCEDURE EVALUATION
Procedure:  LABOR ANALGESIA    Relevant Problems   CARDIO   (+) Pre-eclampsia in third trimester      GYN   (+) 37 weeks gestation of pregnancy   (+) Encounter for supervision of normal first pregnancy in second trimester      PULMONARY   (+) Asthma        Physical Exam    Airway    Mallampati score: II  TM Distance: >3 FB  Neck ROM: full     Dental   No notable dental hx     Cardiovascular      Pulmonary      Other Findings        Anesthesia Plan  ASA Score- 2     Anesthesia Type- epidural with ASA Monitors  Additional Monitors:   Airway Plan:     Comment: Patient examined, history reviewed  Labor epidural explained, risks and benefits discussed  Consent has been signed          Plan Factors-Exercise tolerance (METS): >4 METS  Chart reviewed  Existing labs reviewed  Patient summary reviewed  Induction-     Postoperative Plan-     Informed Consent- Anesthetic plan and risks discussed with patient  I personally reviewed this patient with the CRNA  Discussed and agreed on the Anesthesia Plan with the CRNA  Xenia Schwartz

## 2021-06-26 NOTE — ANESTHESIA PROCEDURE NOTES
Epidural Block    Patient location during procedure: OB  Start time: 6/26/2021 4:18 AM  Reason for block: procedure for pain  Staffing  Performed: resident/CRNA   Anesthesiologist: Mae Joseph MD  Resident/CRNA: José Luis Holden CRNA  Preanesthetic Checklist  Completed: patient identified, IV checked, risks and benefits discussed, surgical consent, monitors and equipment checked, pre-op evaluation and timeout performed  Epidural  Patient position: sitting  Prep: ChloraPrep  Patient monitoring: heart rate, continuous pulse ox and frequent blood pressure checks  Approach: midline  Injection technique: TRISH saline  Needle  Needle type: Tuohy   Needle gauge: 17 G  Catheter type: end hole  Catheter size: 23 G  Catheter at skin depth: 11 cm  Catheter securement method: clear occlusive dressing  Test dose: negativelidocaine 1 5% with epinephrine 1:200,000 test dose, 3 mL  Assessment  Sensory level: T10  Number of attempts: 1negative aspiration for CSF, negative aspiration for heme and no paresthesia on injection  patient tolerated the procedure well with no immediate complications  Additional Notes  Needle stick x 1, no issues

## 2021-06-26 NOTE — OB LABOR/OXYTOCIN SAFETY PROGRESS
Labor Progress Note - Gautam Saba 31 y o  female MRN: [id number]    Unit/Bed#: -01 Encounter: [id number]       Contraction Frequency (minutes): 1-4  Contraction Quality: Moderate, Mild  Tachysystole: No   Cervical Dilation: 1-2  Cervical Effacement: 50  Fetal Station: -3  Baseline Rate: 125 bpm  Fetal Heart Rate: 125 BPM  FHR Category: Category I      Vital Signs:   Vitals:    06/26/21 0038   BP: 144/83   Pulse: 73   Resp:    Temp:        Notes/comments: s/p SROM  Cervical exam as above  Plan to start Pitocin  Fetal heart tracing is category I  Discussed with Dr Mike vEans MD 6/26/2021 12:46 AM

## 2021-06-26 NOTE — OB LABOR/OXYTOCIN SAFETY PROGRESS
Oxytocin Safety Progress Check Note - Ramin [place] 31 y o  female MRN: [id number]    Unit/Bed#: -01 Encounter: [id number]    Dose (tere-units/min) Oxytocin: 4 tere-units/min  Contraction Frequency (minutes): (P) 2-3 5  Contraction Quality: (P) Strong  Tachysystole: (P) No   Cervical Dilation: 10  Dilation Complete Date: 06/26/21  Dilation Complete Time: 1355  Cervical Effacement: 100  Fetal Station: 2  Baseline Rate: (P) 145 bpm  Fetal Heart Rate: 148 BPM  FHR Category: Category I  Oxytocin Safety Progress Check: Safety check completed            Vital Signs:   Vitals:    06/26/21 1332   BP: 150/95   Pulse: 96   Resp:    Temp:    SpO2:            Notes/comments:    Patient feeling significantly increased pressure, of note legs in labia noted to be extremely swollen  Cervical exam as noted above  Fetal heart tracing category 1  Bernardino every 2-3 minutes    Plan:  Start pushing  Dr Casillas Cushing aware    Gene Bland MD 6/26/2021 1:56 PM

## 2021-06-26 NOTE — OB LABOR/OXYTOCIN SAFETY PROGRESS
Oxytocin Safety Progress Check Note - Corie Avila 31 y o  female MRN: [id number]    Unit/Bed#: -01 Encounter: [id number]    Dose (tere-units/min) Oxytocin: 3 tere-units/min (PItocin decreased by half as per Dr Whitley)  Contraction Frequency (minutes): 2-4 5  Contraction Quality: Moderate  Tachysystole: No   Cervical Dilation: 8-9        Cervical Effacement: 80  Fetal Station: 0  Baseline Rate: 160 bpm  Fetal Heart Rate: 163 BPM  FHR Category: Category II  Oxytocin Safety Progress Check: Safety check completed            Vital Signs:   Vitals:    06/26/21 0730   BP:    Pulse:    Resp: 18   Temp: 99 1 °F (37 3 °C)   SpO2:            Notes/comments:   FHT cat II at this time, excellent cervical change noted  Will d/c pitocin and reposition patient at this time for resuscitation  Continue to closely monitor           Lauren Stevens MD 6/26/2021 8:01 AM

## 2021-06-26 NOTE — L&D DELIVERY NOTE
Vaginal Delivery Summary - OB/GYN   Maribel Yañez 31 y o  female MRN: [id number]  Unit/Bed#: LD  Encounter: [id number]    Pre-delivery Diagnosis:   Pregnancy at 37 weeks 6 days  Preeclampsia without severe features  Asthma  BMI 41    Post-delivery Diagnosis: same, delivered    Procedure: Spontaneous Vaginal Delivery     Attending Physician: Dr Nakul Portillo  Resident Physician: Dr Emma Manning    Anesthesia: Epidural    EBL: 200 cc, QBL pending at time of dictation    Complications: none apparent    Specimens:   1  Arterial and venous cord gases  2  Cord blood  3  Segment of umbilical cord  4  Placenta to pathology     Findings:  1  Viable female on 2021 at [phone], with APGARS of 9 and 9 at 1 and 5 minutes respectively,  2  Spontaneous delivery of intact placenta at 1519  3  1 degree laceration repaired with 3-0 Vicryl Rapide    Gases:  Umbilical Cord Venous Blood Gas:  Results from last 7 days   Lab Units 21  1520   PH COV  7 274   PCO2 COV mm HG 43 1*   HCO3 COV mmol/L 19 5   BASE EXC COV mmol/L -7 1*   O2 CT CD VB mL/dL 7 3   O2 HGB, VENOUS CORD % 34 6     Umbilical Cord Arterial Blood Gas:  Results from last 7 days   Lab Units 21  1520   PH COA  7 253   PCO2 COA  41 6   PO2 COA mm HG 15 9   HCO3 COA mmol/L 18 0   BASE EXC COA mmol/L -8 8*   O2 CONTENT CORD ART ml/dl 4 7   O2 HGB, ARTERIAL CORD % 24 1       Brief history and labor course:  Maribel Yañez is a 31 y o  [id number]   She presented to labor and delivery for induction of labor for preeclampsia without severe features (0 42)  She received 2 doses of Cytotec, and spontaneously ruptured her membranes for clear fluid  She was started on Pitocin  She progressed to be complete and started pushing    She had variable decelerations with pushing efforts, which always returned to baseline with moderate variability during rest     Description of delivery:    Patient delivered a viable female , wt pending as mother is doing skin to skin bonding  The fetal vertex delivered direct OA position spontaneously  There was no nuchal cord  The left anterior shoulder delivered atraumatically with maternal expulsive forces and the assistance of gentle downward traction  The right posterior shoulder delivered with maternal expulsive forces and the assistance of gentle upward traction  The remainder of the fetus delivered spontaneously  Upon delivery, the infant was placed on the mothers abdomen and the cord was doubly clamped and cut  Delayed cord clamping was achieved  The infant was noted to cry spontaneously and was moving all extremities appropriately  There was no evidence for injury  Awaiting nurse resuscitators evaluated the   Arterial and venous cord blood gases and cord blood was collected for analysis  These were promptly sent to the lab  In the immediate post-partum, active management of the 3rd stage of labor was performed with massage, the administration of 30 units of IV pitocin, and gentle traction on the umbilical cord  The placenta delivered spontaneously and was noted to have a centrally inserted 3 vessel cord  The placenta was sent to pathology     Laceration Repair  The vagina, cervix, perineum, and rectum were inspected and there was noted to be a small first degree laceration which was repaired with a 3-0 vicryl rapide  At the conclusion of the procedure, all needle, sponge, and instrument counts were noted to be correct  Dr Val Rico was present and participated in all key portions of the case  Disposition:  The patient and the  both tolerated the procedure well and are recovering in labor and delivery room       Khadar Guzman 89, DO  OB/GYN PGY-1  2021  4:14 PM

## 2021-06-26 NOTE — OB LABOR/OXYTOCIN SAFETY PROGRESS
Labor Progress Note - Sin Stephens 31 y o  female MRN: [id number]    Unit/Bed#: LD  Encounter: [id number]    Dose (tere-units/min) Oxytocin: 1 tere-units/min (Per Dr Monty Tomas restart pitocin at 1 unit/ml/hr )  Contraction Frequency (minutes): 2 5-3 5  Contraction Quality: Strong  Tachysystole: No   Cervical Dilation: Lip/rim (Comment)        Cervical Effacement: 100  Fetal Station: 0  Baseline Rate: 145 bpm  Fetal Heart Rate: 145 BPM  FHR Category: Category I  Oxytocin Safety Progress Check: Safety check completed            Vital Signs:   Vitals:    21 1047   BP: 152/87   Pulse: 90   Resp:    Temp:    SpO2:            Notes/comments:   FHT cat 1, MVU not adequate - will titrate pitocin to adequate MVU  Discussed if cervix does not continue to dilate may be indication for  if criteria met        Aye Monte MD 2021 11:36 AM

## 2021-06-26 NOTE — OB LABOR/OXYTOCIN SAFETY PROGRESS
Oxytocin Safety Progress Check Note - Sin Stephens 31 y o  female MRN: [id number]    Unit/Bed#: -01 Encounter: [id number]    Dose (tere-units/min) Oxytocin: 3 tere-units/min (PItocin decreased by half as per Dr Whitley)  Contraction Frequency (minutes): 2-4  Contraction Quality: Moderate  Tachysystole: No   Cervical Dilation: 6-7        Cervical Effacement: 80  Fetal Station: -1  Baseline Rate: 155 bpm  Fetal Heart Rate: 168 BPM  FHR Category: Category II  Oxytocin Safety Progress Check: Safety check completed            Vital Signs:   Vitals:    06/26/21 0645   BP: 146/86   Pulse: 81   Resp: 22   Temp: 98 9 °F (37 2 °C)   SpO2:            Notes/comments:    SVE as above, attempted to place IUPC for variable decelerations but patient not comfortable with her epidural  Will get re-bolus   Pit is halved for now and Dr Monty Tomas notified    Will Dockery DO 6/26/2021 7:01 AM

## 2021-06-27 PROCEDURE — 99024 POSTOP FOLLOW-UP VISIT: CPT | Performed by: OBSTETRICS & GYNECOLOGY

## 2021-06-27 RX ADMIN — DOCUSATE SODIUM 100 MG: 100 CAPSULE, LIQUID FILLED ORAL at 08:20

## 2021-06-27 RX ADMIN — DOCUSATE SODIUM 100 MG: 100 CAPSULE, LIQUID FILLED ORAL at 17:58

## 2021-06-27 NOTE — LACTATION NOTE
This note was copied from a baby's chart  Reviewed expected  feeding patterns in the first few days and encouraged feeding on cue  Given admission breastfeeding pkat and same reviewed  Assisted infant to breast in cross cradle hold  Infant making some attempts but does not stay on for more than a few sucks  Feeding options discussed with mom  To feed with formula  Reviewed finger/syringe feeding  Infant took 5 ml  To continue to pump every 3 hours

## 2021-06-27 NOTE — PROGRESS NOTES
Progress Note - OB/GYN   Diana Jacobsen 31 y o  female MRN: [id number]  Unit/Bed#: -01 Encounter: [id number]    Assessment:  PP#1 s/p Spontaneous Vaginal Delivery, improving    Plan:  Continue routine postpartum care  Possible discharge to home later today if baby is cleared  Subjective/Objective   Chief Complaint:     PP#0 s/p Spontaneous Vaginal Delivery    Subjective:     Pain: yes, responding to oral medication  Tolerating PO: yes  Voiding: yes  Ambulating: yes  Breastfeeding: Breastfeeding  Chest pain: no  Shortness of breath: no  Leg pain: no  Lochia: mild    Objective:     Vitals:   Vitals:    06/26/21 1944 06/26/21 2349 06/27/21 0413 06/27/21 0918   BP: 132/80 133/84 115/72 137/86   BP Location: Left arm Left arm Left arm Left arm   Pulse: 76 84 76    Resp: 18 18 18 18   Temp: 98 5 °F (36 9 °C) 98 3 °F (36 8 °C) 98 5 °F (36 9 °C) 98 5 °F (36 9 °C)   TempSrc: Oral Oral Oral Oral   SpO2: 99% 95% 95% 95%   Height:           Physical Exam:     Physical Exam  Abdominal:      General: There is no distension  Palpations: Abdomen is soft  Tenderness: There is no abdominal tenderness  Musculoskeletal:         General: No tenderness  Right lower leg: Edema present  Left lower leg: Edema present  Comments: Edema symmetric         Uterine fundus firm and non-tender      Lab, Imaging and other studies: I have personally reviewed pertinent reports        Lab Results   Component Value Date    WBC 11 14 (H) 06/25/2021    HGB 12 9 06/25/2021    HCT 38 5 06/25/2021    MCV 89 06/25/2021     06/25/2021               Leah Rodríguez MD  06/27/21

## 2021-06-28 VITALS
DIASTOLIC BLOOD PRESSURE: 89 MMHG | RESPIRATION RATE: 18 BRPM | BODY MASS INDEX: 41.17 KG/M2 | SYSTOLIC BLOOD PRESSURE: 154 MMHG | OXYGEN SATURATION: 98 % | TEMPERATURE: 97.9 F | HEIGHT: 60 IN | HEART RATE: 90 BPM

## 2021-06-28 PROBLEM — Z3A.37 37 WEEKS GESTATION OF PREGNANCY: Status: RESOLVED | Noted: 2021-06-25 | Resolved: 2021-06-28

## 2021-06-28 LAB — RPR SER QL: NORMAL

## 2021-06-28 PROCEDURE — 99024 POSTOP FOLLOW-UP VISIT: CPT | Performed by: OBSTETRICS & GYNECOLOGY

## 2021-06-28 RX ORDER — IBUPROFEN 600 MG/1
600 TABLET ORAL EVERY 6 HOURS PRN
Qty: 30 TABLET | Refills: 0
Start: 2021-06-28 | End: 2021-07-01

## 2021-06-28 RX ORDER — DIAPER,BRIEF,INFANT-TODD,DISP
1 EACH MISCELLANEOUS 4 TIMES DAILY PRN
Qty: 30 G | Refills: 0
Start: 2021-06-28 | End: 2021-07-01

## 2021-06-28 RX ORDER — ACETAMINOPHEN 325 MG/1
650 TABLET ORAL EVERY 4 HOURS PRN
Refills: 0
Start: 2021-06-28 | End: 2021-07-01

## 2021-06-28 RX ORDER — DOCUSATE SODIUM 100 MG/1
100 CAPSULE, LIQUID FILLED ORAL 2 TIMES DAILY
Qty: 10 CAPSULE | Refills: 0
Start: 2021-06-28 | End: 2021-07-01

## 2021-06-28 RX ORDER — SIMETHICONE 80 MG
80 TABLET,CHEWABLE ORAL 4 TIMES DAILY PRN
Qty: 30 TABLET | Refills: 0
Start: 2021-06-28 | End: 2021-07-01

## 2021-06-28 RX ADMIN — DOCUSATE SODIUM 100 MG: 100 CAPSULE, LIQUID FILLED ORAL at 09:10

## 2021-06-28 NOTE — PLAN OF CARE
Problem: Knowledge Deficit  Goal: Patient/family/caregiver demonstrates understanding of disease process, treatment plan, medications, and discharge instructions  Description: Complete learning assessment and assess knowledge base    Interventions:  - Provide teaching at level of understanding  - Provide teaching via preferred learning methods  Outcome: Progressing     Problem: PAIN - ADULT  Goal: Verbalizes/displays adequate comfort level or baseline comfort level  Description: Interventions:  - Encourage patient to monitor pain and request assistance  - Assess pain using appropriate pain scale  - Administer analgesics based on type and severity of pain and evaluate response  - Implement non-pharmacological measures as appropriate and evaluate response  - Consider cultural and social influences on pain and pain management  - Notify physician/advanced practitioner if interventions unsuccessful or patient reports new pain  Outcome: Progressing     Problem: INFECTION - ADULT  Goal: Absence or prevention of progression during hospitalization  Description: INTERVENTIONS:  - Assess and monitor for signs and symptoms of infection  - Monitor lab/diagnostic results  - Monitor all insertion sites, i e  indwelling lines, tubes, and drains  - Monitor endotracheal if appropriate and nasal secretions for changes in amount and color  - F[place] appropriate cooling/warming therapies per order  - Administer medications as ordered  - Instruct and encourage patient and family to use good hand hygiene technique  - Identify and instruct in appropriate isolation precautions for identified infection/condition  Outcome: Progressing  Goal: Absence of fever/infection during neutropenic period  Description: INTERVENTIONS:  - Monitor WBC    Outcome: Progressing     Problem: SAFETY ADULT  Goal: Patient will remain free of falls  Description: INTERVENTIONS:  - Educate patient/family on patient safety including physical limitations  - Instruct patient to call for assistance with activity   - Consult OT/PT to assist with strengthening/mobility   - Keep Call bell within reach  - Keep bed low and locked with side rails adjusted as appropriate  - Keep care items and personal belongings within reach  - Initiate and maintain comfort rounds  - Make Fall Risk Sign visible to staff  - Apply yellow socks and bracelet for high fall risk patients  - Consider moving patient to room near nurses station  Outcome: Progressing  Goal: Maintain or return to baseline ADL function  Description: INTERVENTIONS:  -  Assess patient's ability to carry out ADLs; assess patient's baseline for ADL function and identify physical deficits which impact ability to perform ADLs (bathing, care of mouth/teeth, toileting, grooming, dressing, etc )  - Assess/evaluate cause of self-care deficits   - Assess range of motion  - Assess patient's mobility; develop plan if impaired  - Assess patient's need for assistive devices and provide as appropriate  - Encourage maximum independence but intervene and supervise when necessary  - Involve family in performance of ADLs  - Assess for home care needs following discharge   - Consider OT consult to assist with ADL evaluation and planning for discharge  - Provide patient education as appropriate  Outcome: Progressing  Goal: Maintains/Returns to pre admission functional level  Description: INTERVENTIONS:  - Perform BMAT or MOVE assessment daily    - Set and communicate daily mobility goal to care team and patient/family/caregiver     - Collaborate with rehabilitation services on mobility goals if consulted  - Out of bed for toileting  - Record patient progress and toleration of activity level   Outcome: Progressing     Problem: DISCHARGE PLANNING  Goal: Discharge to home or other facility with appropriate resources  Description: INTERVENTIONS:  - Identify barriers to discharge w/patient and caregiver  - Arrange for needed discharge resources and transportation as appropriate  - Identify discharge learning needs (meds, wound care, etc )  - Arrange for interpretive services to assist at discharge as needed  - Refer to Case Management Department for coordinating discharge planning if the patient needs post-hospital services based on physician/advanced practitioner order or complex needs related to functional status, cognitive ability, or social support system  Outcome: Progressing     Problem: Knowledge Deficit  Goal: Verbalizes understanding of labor plan  Description: Assess patient/family/caregiver's baseline knowledge level and ability to understand information  Provide education via patient/family/caregiver's preferred learning method at appropriate level of understanding  1  Provide teaching at level of understanding  2  Provide teaching via preferred learning method(s)  Outcome: Completed     Problem: Labor & Delivery  Goal: Manages discomfort  Description: Assess and monitor for signs and symptoms of discomfort  Assess patient's pain level regularly and per hospital policy  Administer medications as ordered  Support use of nonpharmacological methods to help control pain such as distraction, imagery, relaxation, and application of heat and cold  Collaborate with interdisciplinary team and patient to determine appropriate pain management plan  1  Include patient in decisions related to comfort  2  Offer non-pharmacological pain management interventions  3  Report ineffective pain management to physician  Outcome: Completed  Goal: Patient vital signs are stable  Description: 1  Assess vital signs - vaginal delivery    Outcome: Completed

## 2021-06-28 NOTE — PROGRESS NOTES
Progress Note - OB/GYN   Jamshid Chapman 31 y o  female MRN: [id number]  Unit/Bed#:  316-01 Encounter: [id number]    Assessment:  Post partum Day #1 s/p , stable, baby in room    Plan:  1) Preeclampsia without severe features   Bps 115-147/58-91   Last SRBP  @ 1647   Asymptomatic  2) Continue routine post partum care   Encourage ambulation   Encourage breastfeeding   Anticipate discharge PPD#2     Subjective/Objective   Chief Complaint:     Post delivery  Patient is doing well  Lochia WNL  Pain well controlled  Denies headaches, vision changes, RUQ/epigastric pain, LE pain/tenderness  Subjective:     Pain: yes, cramping, improved with meds  Tolerating PO: yes  Voiding: yes  Flatus: yes  BM: yes  Ambulating: yes  Chest pain: no  Shortness of breath: no  Leg pain: no  Lochia: minimal    Objective:     Vitals: /58 (BP Location: Right arm)   Pulse 67   Temp 98 6 °F (37 °C) (Oral)   Resp 18   Ht 5' (1 524 m)   LMP 10/04/2020 (Exact Date)   SpO2 98%   Breastfeeding Yes   BMI 41 17 kg/m²     I/O        07 -  0700  07 -  0700  07 -  0700    P  O        I V  Total Intake       Urine 1700      Blood 222      Total Output       Net -                   Lab Results   Component Value Date    WBC 11 14 (H) 2021    HGB 12 9 2021    HCT 38 5 2021    MCV 89 2021     2021       Physical Exam:     Gen: AAOx3, NAD  CV: RRR  Lungs: CTA b/l  Abd: Soft, non-tender, non-distended, no rebound or guarding  Uterine fundus firm and non-tender, 1 cm below the umbilicus     Ext: Non tender    Geoffery Aase, MD  2021  7:11 AM

## 2021-06-28 NOTE — PLAN OF CARE
Problem: Knowledge Deficit  Goal: Patient/family/caregiver demonstrates understanding of disease process, treatment plan, medications, and discharge instructions  Description: Complete learning assessment and assess knowledge base    Interventions:  - Provide teaching at level of understanding  - Provide teaching via preferred learning methods  Outcome: Progressing     Problem: PAIN - ADULT  Goal: Verbalizes/displays adequate comfort level or baseline comfort level  Description: Interventions:  - Encourage patient to monitor pain and request assistance  - Assess pain using appropriate pain scale  - Administer analgesics based on type and severity of pain and evaluate response  - Implement non-pharmacological measures as appropriate and evaluate response  - Consider cultural and social influences on pain and pain management  - Notify physician/advanced practitioner if interventions unsuccessful or patient reports new pain  Outcome: Progressing     Problem: INFECTION - ADULT  Goal: Absence or prevention of progression during hospitalization  Description: INTERVENTIONS:  - Assess and monitor for signs and symptoms of infection  - Monitor lab/diagnostic results  - Monitor all insertion sites, i e  indwelling lines, tubes, and drains  - Monitor endotracheal if appropriate and nasal secretions for changes in amount and color  - [place] appropriate cooling/warming therapies per order  - Administer medications as ordered  - Instruct and encourage patient and family to use good hand hygiene technique  - Identify and instruct in appropriate isolation precautions for identified infection/condition  Outcome: Progressing  Goal: Absence of fever/infection during neutropenic period  Description: INTERVENTIONS:  - Monitor WBC    Outcome: Progressing     Problem: SAFETY ADULT  Goal: Patient will remain free of falls  Description: INTERVENTIONS:  - Educate patient/family on patient safety including physical limitations  - Instruct patient to call for assistance with activity   - Consult OT/PT to assist with strengthening/mobility   - Keep Call bell within reach  - Keep bed low and locked with side rails adjusted as appropriate  - Keep care items and personal belongings within reach  - Initiate and maintain comfort rounds  - Make Fall Risk Sign visible to staff  - Offer Toileting every  Hours, in advance of need  - Initiate/Maintain alarm  - Obtain necessary fall risk management equipment:   - Apply yellow socks and bracelet for high fall risk patients  - Consider moving patient to room near nurses station  Outcome: Progressing  Goal: Maintain or return to baseline ADL function  Description: INTERVENTIONS:  -  Assess patient's ability to carry out ADLs; assess patient's baseline for ADL function and identify physical deficits which impact ability to perform ADLs (bathing, care of mouth/teeth, toileting, grooming, dressing, etc )  - Assess/evaluate cause of self-care deficits   - Assess range of motion  - Assess patient's mobility; develop plan if impaired  - Assess patient's need for assistive devices and provide as appropriate  - Encourage maximum independence but intervene and supervise when necessary  - Involve family in performance of ADLs  - Assess for home care needs following discharge   - Consider OT consult to assist with ADL evaluation and planning for discharge  - Provide patient education as appropriate  Outcome: Progressing  Goal: Maintains/Returns to pre admission functional level  Description: INTERVENTIONS:  - Perform BMAT or MOVE assessment daily    - Set and communicate daily mobility goal to care team and patient/family/caregiver  - Collaborate with rehabilitation services on mobility goals if consulted  - Perform Range of Motion  times a day  - Reposition patient every  hours    - Dangle patient  times a day  - Stand patient  times a day  - Ambulate patient  times a day  - Out of bed to chair  times a day   - Out of bed for meal times a day  - Out of bed for toileting  - Record patient progress and toleration of activity level   Outcome: Progressing     Problem: DISCHARGE PLANNING  Goal: Discharge to home or other facility with appropriate resources  Description: INTERVENTIONS:  - Identify barriers to discharge w/patient and caregiver  - Arrange for needed discharge resources and transportation as appropriate  - Identify discharge learning needs (meds, wound care, etc )  - Arrange for interpretive services to assist at discharge as needed  - Refer to Case Management Department for coordinating discharge planning if the patient needs post-hospital services based on physician/advanced practitioner order or complex needs related to functional status, cognitive ability, or social support system  Outcome: Progressing

## 2021-06-28 NOTE — LACTATION NOTE
This note was copied from a baby's chart  Met with mother to go over discharge breastfeeding booklet including the feeding log  Emphasized 8 or more (12) feedings in a 24 hour period, what to expect for the number of diapers per day of life and the progression of properties of the  stooling pattern  Reviewed breastfeeding and your lifestyle, storage and preparation of breast milk, how to keep you breast pump clean, the employed breastfeeding mother and paced bottle feeding handouts  Booklet included Breastfeeding Resources for after discharge including access to the number for the [address]  Discussed s/s engorgement and how to manage with medications, additional feedings at the breast or pumping sessions as needed, and cool compresses as well as s/s and management of mastitis and when to contact physician  Mom has been mostly pumping overnight, states baby was sucking but could not maintain latch  Plan discussed to offer breast every 2 hours with baby skin to skin  Pump or hand express if no latch established  Assisted Mom with latching baby at this time  [address] very well and does need some support staying latched  Repositioned on the opposite breast and Mom latches baby with verbal assistance only  Rocker suck and audible swallows noted  For pump guidance, we discussed cycle and hands on pumping with the Spectra pump  Appt made at [facility] for Thursday for ongoing support

## 2021-06-30 ENCOUNTER — TELEPHONE (OUTPATIENT)
Dept: OBGYN CLINIC | Facility: CLINIC | Age: 26
End: 2021-06-30

## 2021-06-30 ENCOUNTER — OFFICE VISIT (OUTPATIENT)
Dept: POSTPARTUM | Facility: CLINIC | Age: 26
End: 2021-06-30
Payer: COMMERCIAL

## 2021-06-30 VITALS — DIASTOLIC BLOOD PRESSURE: 100 MMHG | SYSTOLIC BLOOD PRESSURE: 144 MMHG

## 2021-06-30 DIAGNOSIS — Z71.89 ENCOUNTER FOR BREAST FEEDING COUNSELING: Primary | ICD-10-CM

## 2021-06-30 PROCEDURE — 99404 PREV MED CNSL INDIV APPRX 60: CPT | Performed by: PEDIATRICS

## 2021-06-30 NOTE — PATIENT INSTRUCTIONS
Continue feeding Isabela at least every 3 hours for now  Use the paced bottle feeding technique we reviewed here today  You can find a video about this technique at www lacted  org  Continue pumping as often as needed to prevent engorgement and establish adequate supply  When pumping, cycle your pump through stimulation and expression mode several times in a session to stimulate several let downs  Use hands on pumping and hand expression to increase your output  Maintain your pump as recommended  Use flange that fits comfortably and allows the breast to empty effectively  Spend as much time as you can skin to skin with [facility]  Please call with any questions or concerns

## 2021-06-30 NOTE — PROGRESS NOTES
INITIAL BREAST FEEDING EVALUATION    Informant/Relationship: Nona and Clayton    Discussion of General Lactation Issues: Jaun Hanson is having trouble latching  Nona is pumping and syringe feeding  She is looking for help transitioning to bottle feeding  For now, Nona prefers to exclusively pump and maybe latch from time to time  Infant is 3days old today          History:  Fertility Problem:no  Breast changes:yes - breasts were larger, nipples and areola were larger and darker, leaking since the second trimester  : yes - induced due to maternal health issues  Full term:No 40 6/7 weeks   labor:no  First nursing/attempt < 1 hour after birth:yes - baby latced in the delivery room for a very brief time  Skin to skin following delivery:yes - until after the first feeding attempt  Breast changes after delivery:yes - breasts were full and saw mature milk today  Rooming in (infant in room with mother with exception of procedures, eg  Circumcision: yes - did not leave parents  Blood sugar issues:no  NICU stay:no  Jaundice:no  Phototherapy:no  Supplement given: (list supplement and method used as well as reason(s):yes - expressed colostrum and formula via syringe    Past Medical History:   Diagnosis Date    Asthma     Seasonal allergies     Strabismus     corrected in          Current Outpatient Medications:     acetaminophen (TYLENOL) 325 mg tablet, Take 2 tablets (650 mg total) by mouth every 4 (four) hours as needed for mild pain, Disp: , Rfl: 0    albuterol (PROVENTIL HFA,VENTOLIN HFA) 90 mcg/act inhaler, Inhale 2 puffs every 6 (six) hours as needed for wheezing, Disp: , Rfl:     benzocaine-menthol-lanolin-aloe (DERMOPLAST) 20-0 5 % topical spray, Apply 1 application topically 4 (four) times a day as needed for mild pain or irritation, Disp: , Rfl: 0    docusate sodium (COLACE) 100 mg capsule, Take 1 capsule (100 mg total) by mouth 2 (two) times a day, Disp: 10 capsule, Rfl: 0   hydrocortisone 1 % cream, Apply 1 application topically 4 (four) times a day as needed for irritation or rash, Disp: 30 g, Rfl: 0    ibuprofen (MOTRIN) 600 mg tablet, Take 1 tablet (600 mg total) by mouth every 6 (six) hours as needed (cramping), Disp: 30 tablet, Rfl: 0    loratadine (Claritin) 10 mg tablet, Take 10 mg by mouth daily, Disp: , Rfl:     Prenatal MV-Min-Fe Fum-FA-DHA (PRENATAL+DHA PO), Take by mouth, Disp: , Rfl:     simethicone (MYLICON) 80 mg chewable tablet, Chew 1 tablet (80 mg total) 4 (four) times a day as needed for flatulence, Disp: 30 tablet, Rfl: 0    witch hazel-glycerin (TUCKS) topical pad, Apply 1 pad topically every 2 (two) hours as needed for irritation, Disp: , Rfl: 0    Allergies   Allergen Reactions    Rony Flavor [Flavoring Agent] Throat Swelling       Social History     Substance and Sexual Activity   Drug Use Never       Social History Never a smoker    Interval Breastfeeding History:    Frequency of breast feeding: Attempting once or twice a day  Does mother feel breastfeeding is effective: No  Does infant appear satisfied after nursing:No  Stooling pattern normal: Yes  Urinating frequently:Yes  Using shield or shells: No    Alternative/Artificial Feedings:   Bottle: No  Cup: No  Syringe/Finger: Yes, for every feeding           Formula Type: none                     Amount: n/a            Breast Milk:                      Amount: 10-15ml            Frequency Q 1-2 Hr between feedings  Elimination Problems: No      Equipment:  Nipple Shield             Type: none             Size: n/a             Frequency of Use: n/a  Pump            Type: Spectra S2            Frequency of Use: Every 3 hours  Expresses about 2 ounces per session as of today  Shells            Type: none            Frequency of use: n/a    Equipment Problems: no    Mom:  Breast: Large symmetrical breast   Dense tissue  Closely spaced    Full  Nipple Assessment in General: Normal: elongated/eraser, no discoloration and no damage noted  Mother's Awareness of Feeding Cues                 Recognizes: Yes                  Verbalizes: Yes  Support System: FOB, extended family  History of Breastfeeding: none  Changes/Stressors/Violence: Nona is feeling good about feedings but wants to move forward to bottle feeding  Concerns/Goals: For now, MahendraRancho Cordova desires to exclusively pump and bottle feed    Problems with Mom: none    Physical Exam  Constitutional:       Appearance: Normal appearance  HENT:      Head: Normocephalic and atraumatic  Cardiovascular:      Rate and Rhythm: Normal rate and regular rhythm  Pulses: Normal pulses  Heart sounds: Normal heart sounds  Pulmonary:      Effort: Pulmonary effort is normal       Breath sounds: Normal breath sounds  Musculoskeletal:         General: Swelling present  Normal range of motion  Cervical back: Normal range of motion and neck supple  Neurological:      Mental Status: She is alert and oriented to person, place, and time  Skin:     General: Skin is warm and dry  Psychiatric:         Mood and Affect: Mood normal          Behavior: Behavior normal          Thought Content: Thought content normal          Judgment: Judgment normal          Infant:  Behaviors: Alert  Color: Pink  Birth weight: 2296gram  Current weight: 2090gram    Problems with infant: SGA, feeding difficulty      General Appearance:  Alert, active, no distress                            Head:  Normocephalic, AFOF, sutures ridged                            Eyes:   Conjunctiva clear, no drainage                            Ears:   Normally placed, no anomolies                           Nose:   no drainage or erythema                          Mouth:  No lesions  Tongue extends, lateralizes and elevates well  Disorganized suck with frequent biting,  Some appropriate cupping and sucking noted                     Neck:  Supple, symmetrical, trachea midline Respiratory:  No grunting, flaring, retractions, breath sounds clear and equal           Cardiovascular:  Regular rate and rhythm  No murmur  Adequate perfusion/capillary refill  Femoral pulse present                  Abdomen:    Soft, non-tender, no masses, bowel sounds present, no HSM            Genitourinary:  Normal female genitalia, anus patent                         Spine:   No abnormalities noted       Musculoskeletal:   Full range of motion         Skin/Hair/Nails:   Skin warm, dry, and intact, no rashes or abnormal dyspigmentation or lesions               Neurologic:   No abnormal movement, tone appropriate for gestational age    Infant feeding:  Sonali Almeida was fed expressed milk via paced bottle feeding  Mahendrahaseeb was taught paced technique and she and Sonali Almeida were comfortable with the technique  Sonali Almeida was able to latch effectively on a standard bottle teat  Handouts:   Paced bottle feeding, Hands on pumping and Hand expression    Education:  Reviewed Frequency/Supply & Demand: Discussed how milk supply is established and maintained  Reviewed Alternative/Artificial Feedings: Discussed and demonstrated paced bottle feeding  Reviewed Equipment: Discussed the use and features of the Spectra S2 and the elements of hands on pumping  Plan:  Plan for breastfeeding    Reassurance and support given  Nona plans to exclusively pump  She was taught how to use her pump effectively and how to determine how frequently she needs to pump to establish and maintain full supply  Nona and Eric were taught paced bottle feeding technique  I encouraged lots of skin to skin with Sonali Almeida  I encouraged Nona to call with any questions or concerns  I have spent 60 minutes with Patient and family today in which greater than 50% of this time was spent in counseling/coordination of care regarding Patient and family education

## 2021-07-01 ENCOUNTER — POSTPARTUM VISIT (OUTPATIENT)
Dept: OBGYN CLINIC | Facility: CLINIC | Age: 26
End: 2021-07-01

## 2021-07-01 VITALS — BODY MASS INDEX: 38.16 KG/M2 | SYSTOLIC BLOOD PRESSURE: 140 MMHG | WEIGHT: 195.4 LBS | DIASTOLIC BLOOD PRESSURE: 89 MMHG

## 2021-07-01 DIAGNOSIS — Z01.30 BLOOD PRESSURE CHECK: ICD-10-CM

## 2021-07-01 PROCEDURE — 99024 POSTOP FOLLOW-UP VISIT: CPT | Performed by: OBSTETRICS & GYNECOLOGY

## 2021-07-01 NOTE — PROGRESS NOTES
Mary Muller  1995    S:  31 y o  female here for blood pressure check    She is s/p  on 21  Her pregnancy was complicated by preeclampsia w/o SF  Baby was ultimately SGA  She is pumping and baby is doing well! EPDS is 2  She is not on blood pressure medication at this time  She feels well without headaches, visual changes, RUQ/epigastric pain  Past Medical History:   Diagnosis Date    Asthma     Seasonal allergies     Strabismus     corrected in      Social History     Socioeconomic History    Marital status: Single     Spouse name: Not on file    Number of children: Not on file    Years of education: Not on file    Highest education level: Not on file   Occupational History    Occupation: Speech Language Pathologist   Tobacco Use    Smoking status: Never Smoker    Smokeless tobacco: Never Used   Vaping Use    Vaping Use: Never used   Substance and Sexual Activity    Alcohol use: Not Currently     Comment: Social    Drug use: Never    Sexual activity: Not Currently     Partners: Male     Birth control/protection: None   Other Topics Concern    Not on file   Social History Narrative    Not on file     Social Determinants of Health     Financial Resource Strain:     Difficulty of Paying Living Expenses:    Food Insecurity:     Worried About Running Out of Food in the Last Year:     [address] in the Last Year:    Transportation Needs:     Lack of Transportation (Medical):      Lack of Transportation (Non-Medical):    Physical Activity:     Days of Exercise per Week:     Minutes of Exercise per Session:    Stress:     Feeling of Stress :    Social Connections:     Frequency of Communication with Friends and Family:     Frequency of Social Gatherings with Friends and Family:     Attends Gnosticism Services:     Active Member of Clubs or Organizations:     Attends Club or Organization Meetings:     Marital Status:    Intimate Partner Violence:  Fear of Current or Ex-Partner:     Emotionally Abused:     Physically Abused:     Sexually Abused:      Family History   Problem Relation Age of Onset    Lupus Mother    [facility] Migraines Mother     Thyroid disease Mother     Thyroid cancer Mother     Migraines Maternal Grandmother     Lupus Maternal Grandmother     Diabetes Maternal Grandfather     Heart disease Maternal Grandfather     Arthritis Paternal Grandmother     Diabetes Paternal Grandmother     Hypertension Paternal Grandmother     Obesity Paternal Grandmother     Stroke Paternal Grandmother     Arthritis Paternal Grandfather     Arthritis Paternal Uncle     Breast cancer Other         PGGM    Migraines Maternal Aunt     No Known Problems Father     Congenital heart disease Sister         needed a pacemaker at 1yrs old    No Known Problems Brother     Raynaud syndrome Sister        Current Outpatient Medications:     benzocaine-menthol-lanolin-aloe (DERMOPLAST) 20-0 5 % topical spray, Apply 1 application topically 4 (four) times a day as needed for mild pain or irritation, Disp: , Rfl: 0    Prenatal MV-Min-Fe Fum-FA-DHA (PRENATAL+DHA PO), Take by mouth, Disp: , Rfl:     albuterol (PROVENTIL HFA,VENTOLIN HFA) 90 mcg/act inhaler, Inhale 2 puffs every 6 (six) hours as needed for wheezing (Patient not taking: Reported on 7/1/2021), Disp: , Rfl:     O:  She appears well and is in no distress  Vitals:    07/01/21 1145   BP: 140/89     Abdomen is soft and nontender  There is edema in the feet, +2 to b/l knees      A/P:  Postpartum blood pressure check  Continue present regimen  Patient advised to call with any orthostatic symptoms, headaches, increase in edema  Continue to monitor BP at home, strict precautions reviewed       Return in 2wks for recheck, sooner PRN

## 2021-07-06 NOTE — PROGRESS NOTES
I have reviewed the notes, assessments, and/or procedures performed by Singh Ruby, RN, IBCLC, I concur with her/his documentation of Coni Oppenheim, MD 07/05/21

## 2021-07-15 ENCOUNTER — POSTPARTUM VISIT (OUTPATIENT)
Dept: OBGYN CLINIC | Facility: CLINIC | Age: 26
End: 2021-07-15

## 2021-07-15 VITALS — SYSTOLIC BLOOD PRESSURE: 102 MMHG | WEIGHT: 183.8 LBS | BODY MASS INDEX: 35.9 KG/M2 | DIASTOLIC BLOOD PRESSURE: 65 MMHG

## 2021-07-15 DIAGNOSIS — O14.93 PRE-ECLAMPSIA IN THIRD TRIMESTER: ICD-10-CM

## 2021-07-15 PROCEDURE — 99024 POSTOP FOLLOW-UP VISIT: CPT | Performed by: OBSTETRICS & GYNECOLOGY

## 2021-07-15 NOTE — PROGRESS NOTES
Libra Dodge  1995    S:  31 y o  female here for postpartum visit  She is s/p Uncomplicated vaginal delivery on 6/26/21   Gender: female "Gill Oppenheim"  Apgars: 9/9  Weight: 5lb 1oz  Her lochia has resolved  She is Using breast pump without problems  Has a great supply - pumped 10oz in a session last night! Discussed importance of continuing to empty every 3 hours during day and a middle of the night pump to maintain supply in first 12 weeks  She denies postpartum blues/depression  Her EPDS is 3  Last Pap: 12/29/20 - Normal Cytology    We discussed contraceptive options in detail including birth control pills, patches, NuvaRing, DepoProvera, Mirena/Kyleena IUD, Nexplanon in detail  At this point she would like to use natural family planning when her cycles returned  Discussed lactational amenorrhea is not a reliable form of contraception and she should use condoms prior to her cycle resuming  O:   /65 (BP Location: Left arm, Patient Position: Sitting, Cuff Size: Standard)   Wt 83 4 kg (183 lb 12 8 oz)   LMP 10/04/2020 (Exact Date)   BMI 35 90 kg/m²   She appears well and in no distress  Abdomen is soft and nontender  External genitals are normal  Vagina is normal  Cervix, uterus and adnexa are nontender, no masses palpable  A/P:  Postpartum visit  She will return in 3-4 months for her yearly exam, sooner PRN  Discussed likely IUGR in pregnancy - LDASA and growth scans in next pregnancy

## 2021-07-23 ENCOUNTER — TELEPHONE (OUTPATIENT)
Dept: OBGYN CLINIC | Facility: CLINIC | Age: 26
End: 2021-07-23

## 2021-07-23 NOTE — TELEPHONE ENCOUNTER
Returned pts' p c -  Pt states she is exclusively breast pumping   Advised pt this is most likely PP blding/lochia  Pt states her blding is not heavy  Recommended to observe for now, as she may have spotting up to 6 wks PP, especially with breast feeding  Advised to call with an further questions/concerns

## 2021-08-16 ENCOUNTER — TELEPHONE (OUTPATIENT)
Dept: OBGYN CLINIC | Facility: CLINIC | Age: 26
End: 2021-08-16

## 2021-08-16 NOTE — TELEPHONE ENCOUNTER
Per comm consent lm to pt to cb to see what exactly she needs in the letter and let her know if she prefers she can get it from my chart vs coming into office  Pt called back and she said she is going back on  with no restrictions after  on 21

## 2021-08-16 NOTE — TELEPHONE ENCOUNTER
Pt deliv 6/26 (CT7) & needs a RTW Letter,  pls add this to epic so she can  in the CV office,  Thanks

## 2021-11-18 ENCOUNTER — ANNUAL EXAM (OUTPATIENT)
Dept: OBGYN CLINIC | Facility: CLINIC | Age: 26
End: 2021-11-18
Payer: COMMERCIAL

## 2021-11-18 VITALS
DIASTOLIC BLOOD PRESSURE: 80 MMHG | WEIGHT: 165.2 LBS | SYSTOLIC BLOOD PRESSURE: 110 MMHG | HEIGHT: 60 IN | BODY MASS INDEX: 32.43 KG/M2

## 2021-11-18 DIAGNOSIS — Z01.419 ROUTINE GYNECOLOGICAL EXAMINATION: Primary | ICD-10-CM

## 2021-11-18 PROBLEM — Z34.02 ENCOUNTER FOR SUPERVISION OF NORMAL FIRST PREGNANCY IN SECOND TRIMESTER: Status: RESOLVED | Noted: 2020-12-29 | Resolved: 2021-11-18

## 2021-11-18 PROBLEM — O99.213 OBESITY COMPLICATING PREGNANCY, THIRD TRIMESTER: Status: RESOLVED | Noted: 2021-05-25 | Resolved: 2021-11-18

## 2021-11-18 PROCEDURE — S0612 ANNUAL GYNECOLOGICAL EXAMINA: HCPCS | Performed by: OBSTETRICS & GYNECOLOGY

## 2022-01-03 ENCOUNTER — ULTRASOUND (OUTPATIENT)
Dept: OBGYN CLINIC | Facility: CLINIC | Age: 27
End: 2022-01-03
Payer: COMMERCIAL

## 2022-01-03 VITALS
HEIGHT: 60 IN | DIASTOLIC BLOOD PRESSURE: 72 MMHG | WEIGHT: 169.8 LBS | SYSTOLIC BLOOD PRESSURE: 102 MMHG | BODY MASS INDEX: 33.34 KG/M2

## 2022-01-03 DIAGNOSIS — N91.1 SECONDARY AMENORRHEA: Primary | ICD-10-CM

## 2022-01-03 PROCEDURE — 76817 TRANSVAGINAL US OBSTETRIC: CPT | Performed by: OBSTETRICS & GYNECOLOGY

## 2022-01-03 PROCEDURE — 99214 OFFICE O/P EST MOD 30 MIN: CPT | Performed by: OBSTETRICS & GYNECOLOGY

## 2022-01-04 PROBLEM — O09.299 HX OF PREECLAMPSIA, PRIOR PREGNANCY, CURRENTLY PREGNANT: Status: ACTIVE | Noted: 2021-06-25

## 2022-01-04 NOTE — PROGRESS NOTES
Assessment/Plan:    Secondary amenorrhea    SIUP @ 6 weeks 2 days [facility] 8/28/2022    RTO 2 weeks for repeat sono for viability and confirmation of EDC      Subjective      Gi Vargas is a 31 y o  Tania Hancock LMP 11/4/2021 who presents with amenorrhea and + urine hCG  Breastfeeding and using OPK's for NFP  No n/v  No spotting  Cycle length: see HPI  Pregnancy testing: at home  Pregnancy imaging: not done  Blood type: A positive  Other lab results: none  Past Medical History:   Diagnosis Date    Asthma     Seasonal allergies     Strabismus     corrected in 2005       Family History   Problem Relation Age of Onset    Lupus Mother    Lisa Courser Migraines Mother     Thyroid disease Mother     Thyroid cancer Mother     Migraines Maternal Grandmother     Lupus Maternal Grandmother     Diabetes Maternal Grandfather     Heart disease Maternal Grandfather     Arthritis Paternal Grandmother     Diabetes Paternal Grandmother     Hypertension Paternal Grandmother     Obesity Paternal Grandmother     Stroke Paternal Grandmother     Arthritis Paternal Grandfather     Arthritis Paternal Uncle     Breast cancer Other         PGGM    Migraines Maternal Aunt     No Known Problems Father     Congenital heart disease Sister         needed a pacemaker at 1yrs old    No Known Problems Brother     Raynaud syndrome Sister      The following portions of the patient's history were reviewed and updated as appropriate: allergies, current medications, past family history, past medical history, past social history, past surgical history and problem list     Review of Systems  Pertinent items are noted in HPI       Objective      /72 (BP Location: Right arm, Patient Position: Sitting, Cuff Size: Large)   Ht 5' (1 524 m)   Wt 77 kg (169 lb 12 8 oz)   LMP 11/04/2021 (Exact Date)   BMI 33 16 kg/m²     General: alert and oriented, in no acute distress   Heart: regular rate and rhythm, S1, S2 normal, no murmur, click, rub or gallop   Lungs: clear to auscultation bilaterally   Abdomen: soft, non-tender, without masses or organomegaly   Vulva: normal     AMB US Pelvic Non OB    Date/Time: 1/4/2022 7:48 AM  Performed by: George Cordon MD  Authorized by: George Cordon MD   Universal Protocol:  Consent: Verbal consent obtained  Risks and benefits: risks, benefits and alternatives were discussed  Consent given by: patient  Time out: Immediately prior to procedure a "time out" was called to verify the correct patient, procedure, equipment, support staff and site/side marked as required  Patient understanding: patient states understanding of the procedure being performed  Patient identity confirmed: verbally with patient      Procedure details:     Indications comment:  Amenorrhea, + urine hCG, breastfeeding    Technique:  Transvaginal US, Non-OB    Position: lithotomy exam    Uterine findings:     Adnexal mass: not identified      Myomas: not identified    Cervix findings:      closed  Left ovary findings:     Left ovary:  Visualized    Cysts: not identified    Right ovary findings:     Right ovary:  Visualized    Cysts: not identified    Other findings:     Free pelvic fluid: not identified    Post-Procedure Details:     Impression:  Single viable intrauterine gestation CRL 0 55cm c/w EGA 6w 2d (NOT c/w EGA by LMP of 8w 4d)/  +  bpm     Tolerance:   Tolerated well, no immediate complications    Complications: no complications

## 2022-02-02 ENCOUNTER — ULTRASOUND (OUTPATIENT)
Dept: OBGYN CLINIC | Facility: CLINIC | Age: 27
End: 2022-02-02
Payer: COMMERCIAL

## 2022-02-02 VITALS — DIASTOLIC BLOOD PRESSURE: 68 MMHG | BODY MASS INDEX: 32.34 KG/M2 | SYSTOLIC BLOOD PRESSURE: 122 MMHG | WEIGHT: 165.6 LBS

## 2022-02-02 DIAGNOSIS — Z13.79 GENETIC SCREENING: ICD-10-CM

## 2022-02-02 DIAGNOSIS — N91.1 SECONDARY AMENORRHEA: Primary | ICD-10-CM

## 2022-02-02 PROCEDURE — 76817 TRANSVAGINAL US OBSTETRIC: CPT | Performed by: OBSTETRICS & GYNECOLOGY

## 2022-02-02 NOTE — PROGRESS NOTES
AMB US Pelvic Non OB    Date/Time: 2/2/2022 2:52 PM  Performed by: Lilian Valdez MD  Authorized by: Lilian Valdez MD   [place] Protocol:  Consent: Verbal consent obtained  Risks and benefits: risks, benefits and alternatives were discussed  Consent given by: patient  Time out: Immediately prior to procedure a "time out" was called to verify the correct patient, procedure, equipment, support staff and site/side marked as required  Patient understanding: patient states understanding of the procedure being performed  Patient identity confirmed: verbally with patient      Procedure details:     Indications comment:  Amenorrhea, + urine hCG, inconsistent dating    Technique:  Transvaginal US, Non-OB    Position: lithotomy exam    Uterine findings:     Myomas: not identified    Cervix findings:      Closed  Left ovary findings:     Left ovary:  Not visualized    Cysts: not identified    Right ovary findings:     Right ovary:  Not visualized    Cysts: not identified    Other findings:     Free pelvic fluid: not identified    Post-Procedure Details:     Impression:  Single viable intrauterine gestation in variable position CRL 3 63 cm (EGA 10w 4d) c/w EGA by previous sono   bpm     Tolerance:   Tolerated well, no immediate complications    Complications: no complications        SIUP @ 10w 3d by sono [facility] 8/28/2022

## 2022-02-09 ENCOUNTER — INITIAL PRENATAL (OUTPATIENT)
Dept: OBGYN CLINIC | Facility: CLINIC | Age: 27
End: 2022-02-09

## 2022-02-09 VITALS — WEIGHT: 165 LBS | HEIGHT: 60 IN | BODY MASS INDEX: 32.39 KG/M2

## 2022-02-09 DIAGNOSIS — Z34.81 PRENATAL CARE, SUBSEQUENT PREGNANCY, FIRST TRIMESTER: Primary | ICD-10-CM

## 2022-02-09 PROCEDURE — OBC: Performed by: OBSTETRICS & GYNECOLOGY

## 2022-02-09 NOTE — PATIENT INSTRUCTIONS
Congratulation!! Please review our Pregnancy Essential Guide and [facility] Virtual tour from our [employer]  [facility] Pregnancy Essentials Guide  [facility] ([address] [facility])     [address] ([address])         Pregnancy at 11 to 14 Weeks   [address]:   You are now at the end of your first trimester and entering your second trimester  Morning sickness usually goes away by this time  You may have other symptoms such as fatigue, frequent urination, and headaches  You may have gained 2 to 4 pounds by now  DISCHARGE INSTRUCTIONS:   Return to the emergency department if:   · You have pain or cramping in your abdomen or low back  · You have heavy vaginal bleeding or clotting  · You pass material that looks like tissue or large clots  Collect the material and bring it with you  Call your doctor or obstetrician if:   · You cannot keep food or drinks down, and you are losing weight  · You have light bleeding  · You have chills or a fever  · You have vaginal itching, burning, or pain  · You have yellow, green, white, or foul-smelling vaginal discharge  · You have pain or burning when you urinate, less urine than usual, or pink or bloody urine  · You have questions or concerns about your condition or care  How to care for yourself at this stage of your pregnancy:       · Get plenty of rest   You may feel more tired than normal  You may need to take naps or go to bed earlier  · Manage nausea and vomiting  Avoid fatty and spicy foods  Eat small meals throughout the day instead of large meals  Brittany may help to decrease nausea  Ask your healthcare provider about other ways of decreasing nausea and vomiting  · Eat a variety of healthy foods  Healthy foods include fruits, vegetables, whole-grain breads, low-fat dairy foods, beans, lean meats, and fish  Drink liquids as directed   Ask how much liquid to drink each day and which liquids are best for you  Limit caffeine to less than 200 milligrams each day  Limit your intake of fish to 2 servings each week  Choose fish low in mercury such as canned light tuna, shrimp, salmon, cod, or tilapia  Do not  eat fish high in mercury such as swordfish, tilefish, zachariah mackerel, and shark  · Take prenatal vitamins as directed  Your need for certain vitamins and minerals, such as folic acid, increases during pregnancy  Prenatal vitamins provide some of the extra vitamins and minerals you need  Prenatal vitamins may also help to decrease the risk of certain birth defects  · Do not smoke  Smoking increases your risk of a miscarriage and other health problems during your pregnancy  Smoking can cause your baby to be born too early or weigh less at birth  Ask your healthcare provider for information if you need help quitting  · Do not drink alcohol  Alcohol passes from your body to your baby through the placenta  It can affect your baby's brain development and cause fetal alcohol syndrome (FAS)  FAS is a group of conditions that causes mental, behavior, and growth problems  · Talk to your healthcare provider before you take any medicines  Many medicines may harm your baby if you take them when you are pregnant  Do not take any medicines, vitamins, herbs, or supplements without first talking to your healthcare provider  Never use illegal or street drugs (such as marijuana or cocaine) while you are pregnant  Safety tips during pregnancy:   · Avoid hot tubs and saunas  Do not use a hot tub or sauna while you are pregnant, especially during your first trimester  Hot tubs and saunas may raise your baby's temperature and increase the risk of birth defects  · Avoid toxoplasmosis  This is an infection caused by eating raw meat or being around infected cat feces  It can cause birth defects, miscarriages, and other problems  Wash your hands after you touch raw meat   Make sure any meat is well-cooked before you eat it  Avoid raw eggs and unpasteurized milk  Use gloves or ask someone else to clean your cat's litter box while you are pregnant  Changes happening with your baby: Your baby has fully formed fingernails and toenails  Your baby's heartbeat can now be heard  Ask your healthcare provider if you can listen to your baby's heartbeat  By week 14, your baby is over 4 inches long from the top of the head to the rump (baby's bottom)  Your baby weighs over 3 ounces  Prenatal care:  Prenatal care is a series of visits with your healthcare provider throughout your pregnancy  During the first 28 weeks of your pregnancy, you will see your healthcare provider 1 time each month  Prenatal care can help prevent problems during pregnancy and childbirth  Your healthcare provider will check your blood pressure and weight  Your baby's heart rate will also be checked  You may also need the following at some visits:  · A pelvic exam  allows your healthcare provider to see your cervix (the bottom part of your uterus)  Your healthcare provider will use a speculum to open your vagina  He or she will check the size and shape of your uterus  · Blood tests  may be done to check for any of the following:    ? Gestational diabetes or anemia (low iron level)    ? Blood type or Rh factor, or certain birth defects    ? Immunity to certain diseases, such as chickenpox or rubella    ? An infection, such as a sexually transmitted infection, HIV, or hepatitis B    · Hepatitis B  may need to be prevented or treated  Hepatitis B is inflammation of the liver caused by the hepatitis B virus (HBV)  HBV can spread from a mother to her baby during delivery  You will be checked for HBV as early as possible in the first trimester of each pregnancy  You need the test even if you received the hepatitis B vaccine or were tested before  You may need to have an HBV infection treated before you give birth      · Urine tests may also be done to check for sugar and protein  These can be signs of gestational diabetes or preeclampsia  Urine tests may also be done to check for signs of infection  · A fetal ultrasound  shows pictures of your baby inside your uterus  The pictures are used to check your baby's development, movement, and position  · Genetic disorder screening tests  may be offered to you  These tests check your baby's risk for genetic disorders such as Down syndrome  A screening test includes a blood test and ultrasound  Follow up with your doctor or obstetrician as directed:  Go to all prenatal visits  Write down your questions so you remember to ask them during your visits  © [employer] 2021 Information is for End User's use only and may not be sold, redistributed or otherwise used for commercial purposes  All illustrations and images included in CareNotes® are the copyrighted property of [employer]  or Sally Gates   The above information is an  only  It is not intended as medical advice for individual conditions or treatments  Talk to your doctor, nurse or pharmacist before following any medical regimen to see if it is safe and effective for you

## 2022-02-09 NOTE — PROGRESS NOTES
OB INTAKE INTERVIEW  Patient is 26 y o y o  who presents for OB intake at 11wks  She is accompanied by: herself  The father of her baby Aleena Mclaughlin) is involved in the pregnancy and is 34years old    Last Menstrual Period: 2021  Ultrasound: Measured 10 weeks 4 days on 22 by EDER  Estimated Date of Delivery: 22 confirmed by 10 week US    Signs/Symptoms of Pregnancy  Current pregnancy symptoms: none  Constipation no  Headaches no  Cramping/spotting no  PICA cravings no    Diabetes-  Body mass index is 32 22 kg/m²  If patient has 1 or more, please order early 1 hour GTT  History of GDM no  BMI >35 no  History of PCOS or current metformin use no  History of LGA/macrosomic infant (4000g/9lbs) no    If patient has 2 or more, please order early 1 hour GTT  BMI>30 YES  AMA no  First degree relative with type 2 diabetes no  History of chronic HTN, hyperlipidemia, elevated A1C no  High risk race (, , ,  or ) no    Hypertension- if you answer yes, please order preeclampsia labs (cbc, comprehensive metabolic panel, urine protein creatinine ratio, uric acid)  History of of chronic HTN no  History of gestational HTN no  History of preeclampsia, eclampsia, or HELLP syndrome YES- pre E  History of diabetes no  History of lupus, autoimmune disease, kidney disease no    Thyroid- if yes order TSH with reflex T4  History of thyroid disease no    Bleeding Disorder or Hx of DVT-patient or first degree relative with history of  Order the following if not done previously     (Factor V, antithrombin III, prothrombin gene mutation, protein C and S Ag, lupus anticoagulant, anticardiolipin, beta-2 glycoprotein)   no    OB/GYN-  History of abnormal pap smear no  History of HPV no  History of Herpes/HSV no  History of other STI (gonorrhea, chlamydia, trich) no  History of prior  YESx1  History of prior  no  History of  delivery prior to 36 weeks 6 days no  History of blood transfusion no  Ok for blood transfusion YES    Substance screening- if yes outside of tobacco for her or anyone in her home-order urine drug screen  History of tobacco use no  Currently using tobacco no  Currently using alcohol no  Presently using drugs no  Past drug use  no  IV drug use-If yes add Hep C antibody to labs no  Partner drug use no  Parent/Family drug use no    MRSA Screening-   Does the pt have a hx of MRSA? no  If yes- please follow MRSA protocol and obtain a nasal swab for MRSA culture    Immunizations:  Influenza vaccine given this season YES  Discussed Tdap vaccine YES  Discussed COVID Vaccine YES    Genetic/MFM-  Do you or your partner have a history of any of the following in yourselves or first degree relatives? Cystic fibrosis no  Spinal muscular atrophy no  Hemoglobinopathy/Sickle Cell/Thalassemia no  Fragile X Intellectual Disability no    If yes, discuss carrier screening and recommend consultation with [facility]/genetic counseling  If no, discuss option for carrier screening and/or genetic testing with Nuchal Ultrasound  Patient interested  YES  Appointment at [facility] made YES, NT is 2/24 and level 2 is on 4/11    Interview education  [facility] Pregnancy Essentials Book reviewed and discussed YES    Nurse/Family Partnership- patient may qualify NO; referral placed NO    Prenatal lab work scripts YES  Extra labs ordered:  preE labs    The patient has a history now or in prior pregnancy notable for:  pre-clampsia      Details that I feel the provider should be aware of: Nona and Osmin Luis delivered their first daughter with us just 7 5 months ago and expecting baby#2  So she is very familiar with our practice and do's and dont's of pregnancy  She said her last pregnancy and delivery were fine, she was induced for PreE just shy of 38wks  She is a speech pathologist and Osmin Luis works in [employer]  PN1 visit scheduled   The patient was oriented to our practice, reviewed delivering physicians and [facility] for Delivery  All questions were answered      Interviewed by: Bambi Figueroa MA

## 2022-02-15 ENCOUNTER — APPOINTMENT (OUTPATIENT)
Dept: LAB | Facility: CLINIC | Age: 27
End: 2022-02-15
Payer: COMMERCIAL

## 2022-02-15 ENCOUNTER — OFFICE VISIT (OUTPATIENT)
Dept: DERMATOLOGY | Facility: CLINIC | Age: 27
End: 2022-02-15
Payer: COMMERCIAL

## 2022-02-15 ENCOUNTER — INITIAL PRENATAL (OUTPATIENT)
Dept: OBGYN CLINIC | Facility: CLINIC | Age: 27
End: 2022-02-15

## 2022-02-15 VITALS — DIASTOLIC BLOOD PRESSURE: 62 MMHG | WEIGHT: 170 LBS | SYSTOLIC BLOOD PRESSURE: 108 MMHG | BODY MASS INDEX: 33.2 KG/M2

## 2022-02-15 VITALS — WEIGHT: 166 LBS | HEIGHT: 60 IN | TEMPERATURE: 98 F | BODY MASS INDEX: 32.59 KG/M2

## 2022-02-15 DIAGNOSIS — D22.9 MULTIPLE MELANOCYTIC NEVI: Primary | ICD-10-CM

## 2022-02-15 DIAGNOSIS — Z11.3 SCREENING FOR STD (SEXUALLY TRANSMITTED DISEASE): ICD-10-CM

## 2022-02-15 DIAGNOSIS — Z34.81 PRENATAL CARE, SUBSEQUENT PREGNANCY, FIRST TRIMESTER: ICD-10-CM

## 2022-02-15 DIAGNOSIS — D48.5 NEOPLASM OF UNCERTAIN BEHAVIOR OF SKIN: ICD-10-CM

## 2022-02-15 DIAGNOSIS — Z34.81 PRENATAL CARE, SUBSEQUENT PREGNANCY, FIRST TRIMESTER: Primary | ICD-10-CM

## 2022-02-15 LAB
ABO GROUP BLD: NORMAL
ALBUMIN SERPL BCP-MCNC: 2.9 G/DL (ref 3.5–5)
ALP SERPL-CCNC: 89 U/L (ref 46–116)
ALT SERPL W P-5'-P-CCNC: 18 U/L (ref 12–78)
AMORPH PHOS CRY URNS QL MICRO: NORMAL /HPF
ANION GAP SERPL CALCULATED.3IONS-SCNC: 7 MMOL/L (ref 4–13)
AST SERPL W P-5'-P-CCNC: 10 U/L (ref 5–45)
BACTERIA UR QL AUTO: NORMAL /HPF
BASOPHILS # BLD AUTO: 0.02 THOUSANDS/ΜL (ref 0–0.1)
BASOPHILS NFR BLD AUTO: 0 % (ref 0–1)
BILIRUB SERPL-MCNC: 0.28 MG/DL (ref 0.2–1)
BILIRUB UR QL STRIP: NEGATIVE
BLD GP AB SCN SERPL QL: NEGATIVE
BUN SERPL-MCNC: 7 MG/DL (ref 5–25)
CALCIUM ALBUM COR SERPL-MCNC: 9.5 MG/DL (ref 8.3–10.1)
CALCIUM SERPL-MCNC: 8.6 MG/DL (ref 8.3–10.1)
CHLORIDE SERPL-SCNC: 106 MMOL/L (ref 100–108)
CLARITY UR: CLEAR
CO2 SERPL-SCNC: 24 MMOL/L (ref 21–32)
COLOR UR: YELLOW
CREAT SERPL-MCNC: 0.54 MG/DL (ref 0.6–1.3)
CREAT UR-MCNC: 120 MG/DL
EOSINOPHIL # BLD AUTO: 0.1 THOUSAND/ΜL (ref 0–0.61)
EOSINOPHIL NFR BLD AUTO: 1 % (ref 0–6)
ERYTHROCYTE [DISTWIDTH] IN BLOOD BY AUTOMATED COUNT: 12.2 % (ref 11.6–15.1)
GFR SERPL CREATININE-BSD FRML MDRD: 129 ML/MIN/1.73SQ M
GLUCOSE SERPL-MCNC: 96 MG/DL (ref 65–140)
GLUCOSE UR STRIP-MCNC: NEGATIVE MG/DL
HBV SURFACE AG SER QL: NORMAL
HCT VFR BLD AUTO: 40.7 % (ref 34.8–46.1)
HCV AB SER QL: NORMAL
HGB BLD-MCNC: 14.2 G/DL (ref 11.5–15.4)
HGB UR QL STRIP.AUTO: NEGATIVE
IMM GRANULOCYTES # BLD AUTO: 0.03 THOUSAND/UL (ref 0–0.2)
IMM GRANULOCYTES NFR BLD AUTO: 0 % (ref 0–2)
KETONES UR STRIP-MCNC: NEGATIVE MG/DL
LEUKOCYTE ESTERASE UR QL STRIP: NEGATIVE
LYMPHOCYTES # BLD AUTO: 2.37 THOUSANDS/ΜL (ref 0.6–4.47)
LYMPHOCYTES NFR BLD AUTO: 27 % (ref 14–44)
MCH RBC QN AUTO: 30.7 PG (ref 26.8–34.3)
MCHC RBC AUTO-ENTMCNC: 34.9 G/DL (ref 31.4–37.4)
MCV RBC AUTO: 88 FL (ref 82–98)
MONOCYTES # BLD AUTO: 0.56 THOUSAND/ΜL (ref 0.17–1.22)
MONOCYTES NFR BLD AUTO: 6 % (ref 4–12)
NEUTROPHILS # BLD AUTO: 5.66 THOUSANDS/ΜL (ref 1.85–7.62)
NEUTS SEG NFR BLD AUTO: 66 % (ref 43–75)
NITRITE UR QL STRIP: NEGATIVE
NON-SQ EPI CELLS URNS QL MICRO: NORMAL /HPF
NRBC BLD AUTO-RTO: 0 /100 WBCS
PH UR STRIP.AUTO: 7.5 [PH]
PLATELET # BLD AUTO: 259 THOUSANDS/UL (ref 149–390)
PMV BLD AUTO: 11.6 FL (ref 8.9–12.7)
POTASSIUM SERPL-SCNC: 4.1 MMOL/L (ref 3.5–5.3)
PROT SERPL-MCNC: 6.7 G/DL (ref 6.4–8.2)
PROT UR STRIP-MCNC: NEGATIVE MG/DL
PROT UR-MCNC: 17 MG/DL
PROT/CREAT UR: 0.14 MG/G{CREAT} (ref 0–0.1)
RBC # BLD AUTO: 4.62 MILLION/UL (ref 3.81–5.12)
RBC #/AREA URNS AUTO: NORMAL /HPF
RH BLD: POSITIVE
RUBV IGG SERPL IA-ACNC: 173.6 IU/ML
SL AMB  POCT GLUCOSE, UA: NORMAL
SL AMB POCT URINE PROTEIN: NORMAL
SODIUM SERPL-SCNC: 137 MMOL/L (ref 136–145)
SP GR UR STRIP.AUTO: 1.02 (ref 1–1.03)
URATE SERPL-MCNC: 3.4 MG/DL (ref 2–6.8)
UROBILINOGEN UR QL STRIP.AUTO: 1 E.U./DL
WBC # BLD AUTO: 8.74 THOUSAND/UL (ref 4.31–10.16)
WBC #/AREA URNS AUTO: NORMAL /HPF

## 2022-02-15 PROCEDURE — 11102 TANGNTL BX SKIN SINGLE LES: CPT | Performed by: STUDENT IN AN ORGANIZED HEALTH CARE EDUCATION/TRAINING PROGRAM

## 2022-02-15 PROCEDURE — 80053 COMPREHEN METABOLIC PANEL: CPT

## 2022-02-15 PROCEDURE — 87086 URINE CULTURE/COLONY COUNT: CPT

## 2022-02-15 PROCEDURE — 84550 ASSAY OF BLOOD/URIC ACID: CPT

## 2022-02-15 PROCEDURE — 88305 TISSUE EXAM BY PATHOLOGIST: CPT | Performed by: STUDENT IN AN ORGANIZED HEALTH CARE EDUCATION/TRAINING PROGRAM

## 2022-02-15 PROCEDURE — 84156 ASSAY OF PROTEIN URINE: CPT

## 2022-02-15 PROCEDURE — 36415 COLL VENOUS BLD VENIPUNCTURE: CPT

## 2022-02-15 PROCEDURE — 82570 ASSAY OF URINE CREATININE: CPT

## 2022-02-15 PROCEDURE — 81001 URINALYSIS AUTO W/SCOPE: CPT

## 2022-02-15 PROCEDURE — 99204 OFFICE O/P NEW MOD 45 MIN: CPT | Performed by: STUDENT IN AN ORGANIZED HEALTH CARE EDUCATION/TRAINING PROGRAM

## 2022-02-15 PROCEDURE — 87491 CHLMYD TRACH DNA AMP PROBE: CPT | Performed by: OBSTETRICS & GYNECOLOGY

## 2022-02-15 PROCEDURE — PNV: Performed by: OBSTETRICS & GYNECOLOGY

## 2022-02-15 PROCEDURE — 80081 OBSTETRIC PANEL INC HIV TSTG: CPT

## 2022-02-15 PROCEDURE — 86803 HEPATITIS C AB TEST: CPT

## 2022-02-15 PROCEDURE — 87591 N.GONORRHOEAE DNA AMP PROB: CPT | Performed by: OBSTETRICS & GYNECOLOGY

## 2022-02-15 NOTE — PROGRESS NOTES
1ST OB Visit  PN1 Labs NOT Done  Advised to go ASAP  Pap: 12/29/20 neg  GC-CH toBringrr Given  Delivered 6/26/21 @ 38 wks/ Preeclampsia  Declines Genetic Testing  Denies LOF, VB  No longer Breast Feeding

## 2022-02-15 NOTE — PROGRESS NOTES
Assessment:     Pregnancy at 12 and 3/7 weeks    H/o preeclampsia     Plan:     Initial labs drawn  Prenatal vitamins  Problem list reviewed and updated  Genetic screening declined  Role of ultrasound in pregnancy discussed; fetal survey: ordered  Start ASA 162mg qd  Follow up in 4 weeks  Greater than 50% of 30 min visit spent on counseling and coordination of care  Michael Maurer is being seen today for her first obstetrical visit  This is a planned pregnancy  She is at 12w3d gestation  Her obstetrical history is significant for pre-eclampsia  Relationship with FOB: spouse, living together  Patient does intend to breast feed  Pregnancy history fully reviewed  Menstrual History:  OB History        2    Para   1    Term   1       0    AB   0    Living   1       SAB   0    IAB   0    Ectopic   0    Multiple   0    Live Births   1                  Patient's last menstrual period was 2021 (exact date)  Past Medical History:   Diagnosis Date    Asthma     Seasonal allergies     Strabismus     corrected in        Past Surgical History:   Procedure Laterality Date    EYE SURGERY      strabismus correction     WISDOM TOOTH EXTRACTION         Current Outpatient Medications on File Prior to Visit   Medication Sig    albuterol (PROVENTIL HFA,VENTOLIN HFA) 90 mcg/act inhaler Inhale 2 puffs every 6 (six) hours as needed for wheezing      Prenatal MV-Min-Fe Fum-FA-DHA (PRENATAL+DHA PO) Take by mouth     No current facility-administered medications on file prior to visit       Allergies   Allergen Reactions    [place] Flavor [Flavoring Agent - Food Allergy] Throat Swelling     Social History     Tobacco Use    Smoking status: Never Smoker    Smokeless tobacco: Never Used   Vaping Use    Vaping Use: Never used   Substance Use Topics    Alcohol use: Not Currently     Comment: Social    Drug use: Never       Family History   Problem Relation Age of Onset    Lupus Mother     Migraines Mother     Thyroid disease Mother     Thyroid cancer Mother     Migraines Maternal Grandmother     Lupus Maternal Grandmother     Diabetes Maternal Grandfather     Heart disease Maternal Grandfather     Arthritis Paternal Grandmother     Diabetes Paternal Grandmother     Hypertension Paternal Grandmother     Obesity Paternal Grandmother     Stroke Paternal Grandmother     Skin cancer Paternal Grandmother     Arthritis Paternal Grandfather     Skin cancer Paternal Grandfather     Arthritis Paternal Uncle     Breast cancer Other         PGGM    Migraines Maternal Aunt     No Known Problems Father     Congenital heart disease Sister         needed a pacemaker at 1yrs old    No Known Problems Brother     Raynaud syndrome Sister     No Known Problems Daughter        The following portions of the patient's history were reviewed and updated as appropriate: allergies, current medications, past family history, past medical history, past social history, past surgical history and problem list     Review of Systems  Pertinent items are noted in HPI        Objective  Vitals:    02/15/22 1430   BP: 108/62         see prenatal physical

## 2022-02-15 NOTE — PROGRESS NOTES
[facility] Note     Patient Name: Tony Sánchez  Encounter Date: 02/15/2022     Have you been cared for by a [facility] in the last 3 years and, if so, which one? No    · Have you traveled outside of the [address] [place] [address] in the past 3 months or outside of the [facility] area in the last 2 weeks? No     May we call your Preferred Phone number to discuss your specific medical information? Yes     May we leave a detailed message that includes your specific medical information? Yes      Today's Chief Concerns:   Concern #1:  Skin check   Concern #2:  Lesions on Left lower leg, left flank, and back    Past Medical History:  Have you personally ever had or currently have any of the following? · Skin cancer (such as Melanoma, Basal Cell Carcinoma, Squamous Cell Carcinoma? (If Yes, please provide more detail)- No  · Eczema: No  · Psoriasis: No  · HIV/AIDS: No  · Hepatitis B or C: No  · Tuberculosis: No  · Systemic Immunosuppression such as Diabetes, Biologic or Immunotherapy, Chemotherapy, Organ Transplantation, Bone Marrow Transplantation (If YES, please provide more detail): No  · Radiation Treatment (If YES, please provide more detail): No  · Any other major medical conditions/concerns? 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 Family History:  Have any of your "first degree relatives" (parent, brother, sister, or child) had any of the following       · Skin cancer such as Melanoma or Merkel Cell Carcinoma or Pancreatic Cancer? No   · Eczema, Asthma, Hay Fever or Seasonal Allergies: YES, Sister has eczema  · Psoriasis or Psoriatic Arthritis: No  · Do any other medical conditions seem to run in your family? If Yes, what condition and which relatives?   No    Current Medications:   (please update all dermatological medications before printing patient's AVS!)      Current Outpatient Medications:     albuterol (PROVENTIL HFA,VENTOLIN HFA) 90 mcg/act inhaler, Inhale 2 puffs every 6 (six) hours as needed for wheezing  , Disp: , Rfl:     Prenatal MV-Min-Fe Fum-FA-DHA (PRENATAL+DHA PO), Take by mouth, Disp: , Rfl:       Review of Systems:  Have you recently had or currently have any of the following? If YES, what are you doing for the problem? · Fever, chills or unintended weight loss: No  · Sudden loss or change in your vision: No  · Nausea, vomiting or blood in your stool: No  · Painful or swollen joints: No  · Wheezing or cough: No  · Changing mole or non-healing wound: YES, Left leg, left flank and back  · Nosebleeds: No  · Excessive sweating: No  · Easy or prolonged bleeding? No  · Over the last 2 weeks, how often have you been bothered by the following problems? · Taking little interest or pleasure in doing things: 1 - Not at All  · Feeling down, depressed, or hopeless: 1 - Not at All  · Rapid heartbeat with epinephrine:  No    · FEMALES ONLY:    · Are you pregnant or planning to become pregnant? YES, patient is 3 months pregnant  · Are you currently or planning to be nursing or breast feeding? YES, patient is currrently breast feeding    · Any known allergies? Allergies   Allergen Reactions    Rony Flavor [Flavoring Agent - Food Allergy] Throat Swelling   ·       Physical Exam:     Was a chaperone (Derm Clinical Assistant) present throughout the entire Physical Exam? Yes     Did the Dermatology Team specifically  the patient on the importance of a Full Skin Exam to be sure that nothing is missed clinically?  Yes}  o Did the patient ultimately request or accept a Full Skin Exam?  Yes  o     CONSTITUTIONAL:   Vitals:    02/15/22 1052   Temp: 98 °F (36 7 °C)   TempSrc: Temporal   Weight: 75 3 kg (166 lb)   Height: 5' (1 524 m)     PSYCH: Normal mood and affect  EYES: Normal conjunctiva  ENT: Normal lips and oral mucosa  CARDIOVASCULAR: No edema  RESPIRATORY: Normal respirations  HEME/LYMPH/IMMUNO:  No ostensible subQ swelling except as noted below in "ASSESSMENT AND PLAN BY DIAGNOSIS"    SKIN:  FULL ORGAN SYSTEM EXAM   Hair, Scalp, Ears, Face Normal except as noted below in Assessment   Neck Normal except as noted below in Assessment   Right Arm/Hand/Fingers Normal except as noted below in Assessment   Left Arm/Hand/Fingers Normal except as noted below in Assessment   Upper Chest/Axillae Viewed areas Normal except as noted below in Assessment   Abdomen, Umbilicus Normal except as noted below in Assessment   Back/Spine Normal except as noted below in Assessment       Right Leg, Foot, Toes Normal except as noted below in Assessment   Left Leg, Foot, Toes Normal except as noted below in Assessment      Pt deferred groin, buttock, breast exam     Assessment and Plan by Diagnosis:    History of Present Condition:     Duration:  How long has this been an issue for you?    o  A few years   Location Affected:  Where on the body is this affecting you? o  :Left lower leg   Quality:  Is there any bleeding, pain, itch, burning/irritation, or redness associated with the skin lesion?    o  Itchy, and bleeds with trauma   Severity:  Describe any bleeding, pain, itch, burning/irritation, or redness on a scale of 1 to 10 (with 10 being the worst)  o  5   Timing:  Does this condition seem to be there pretty constantly or do you notice it more at specific times throughout the day?    o  Constantly   Context:  Have you ever noticed that this condition seems to be associated with specific activities you do?    o  Denies   Modifying Factors:    o Anything that seems to make the condition worse?    -  Denies  o What have you tried to do to make the condition better?     -  Denies   Associated Signs and Symptoms:  Does this skin lesion seem to be associated with any of the following:  o  SL AMB DERM SIGNS AND SYMPTOMS: Itching and Scratching and Bleeding     1  MELANOCYTIC NEVI ("Moles")    Physical Exam:   Anatomic Location Affected:   Mostly on sun-exposed areas of the trunk and extremities   Morphological Description:  Scattered, 1-4mm round to ovoid, symmetrical-appearing, even bordered, skin colored to dark brown macules/papules, mostly in sun-exposed areas   Pertinent Positives:   Pertinent Negatives:No regional lymphadenopathy    Additional History of Present Condition:  Patient is currently 3 months pregnant and has noticed some of her moles changing    Assessment and Plan:  Based on a thorough discussion of this condition and the management approach to it (including a comprehensive discussion of the known risks, side effects and potential benefits of treatment), the patient (family) agrees to implement the following specific plan:   Observe for changes   Recommend sun protection SPF 30 or higher     Melanocytic Nevi  Melanocytic nevi ("moles") are tan or brown, raised or flat areas of the skin which have an increased number of melanocytes  Melanocytes are the cells in our body which make pigment and account for skin color  Some moles are present at birth (I e , "congenital nevi"), while others come up later in life (i e , "acquired nevi")  The sun can stimulate the body to make more moles  Sunburns are not the only thing that triggers more moles  Chronic sun exposure can do it too  Clinically distinguishing a healthy mole from melanoma may be difficult, even for experienced dermatologists  The "ABCDE's" of moles have been suggested as a means of helping to alert a person to a suspicious mole and the possible increased risk of melanoma  The suggestions for raising alert are as follows:    Asymmetry: Healthy moles tend to be symmetric, while melanomas are often asymmetric  Asymmetry means if you draw a line through the mole, the two halves do not match in color, size, shape, or surface texture   Asymmetry can be a result of rapid enlargement of a mole, the development of a raised area on a previously flat lesion, scaling, ulceration, bleeding or scabbing within the mole  Any mole that starts to demonstrate "asymmetry" should be examined promptly by a board certified dermatologist      Border: Healthy moles tend to have discrete, even borders  The border of a melanoma often blends into the normal skin and does not sharply delineate the mole from normal skin  Any mole that starts to demonstrate "uneven borders" should be examined promptly by a board certified dermatologist      Color: Healthy moles tend to be one color throughout  Melanomas tend to be made up of different colors ranging from dark black, blue, white, or red  Any mole that demonstrates a color change should be examined promptly by a board certified dermatologist      Diameter: Healthy moles tend to be smaller than 0 6 cm in size; an exception are "congenital nevi" that can be larger  Melanomas tend to grow and can often be greater than 0 6 cm (1/4 of an inch, or the size of a pencil eraser)  This is only a guideline, and many normal moles may be larger than 0 6 cm without being unhealthy  Any mole that starts to change in size (small to bigger or bigger to smaller) should be examined promptly by a board certified dermatologist      Evolving: Healthy moles tend to "stay the same "  Melanomas may often show signs of change or evolution such as a change in size, shape, color, or elevation  Any mole that starts to itch, bleed, crust, burn, hurt, or ulcerate or demonstrate a change or evolution should be examined promptly by a board certified dermatologist       Dysplastic Nevi  Dysplastic moles are moles that fit the ABCDE rules of melanoma but are not identified as melanomas when examined under the microscope  They may indicate an increased risk of melanoma in that person   If there is a family history of melanoma, most experts agree that the person may be at an increased risk for developing a melanoma  Experts still do not agree on what dysplastic moles mean in patients without a personal or family history of melanoma  Dysplastic moles are usually larger than common moles and have different colors within it with irregular borders  The appearance can be very similar to a melanoma  Biopsies of dysplastic moles may show abnormalities which are different from a regular mole  Melanoma  Malignant melanoma is a type of skin cancer that can be deadly if it spreads throughout the body  The incidence of melanoma in the [place] is growing faster than any other cancer  Melanoma usually grows near the surface of the skin for a period of time, and then begins to grow deeper into the skin  Once it grows deeper into the skin, the risk of spread to other organs greatly increases  Therefore, early detection and removal of a malignant melanoma may result in a better chance at a complete cure; removal after the tumor has spread may not be as effective, leading to worse clinical outcomes such as death  The true rate of nevus transformation into a melanoma is unknown  It has been estimated that the lifetime risk for any acquired melanocytic nevus on any 21year-old individual transforming into melanoma by age [de-identified] is 0 03% (1 in 3,164) for men and 0 009% (1 in 10,800) for women  The appearance of a "new mole" remains one of the most reliable methods for identifying a malignant melanoma  Occasionally, melanomas appear as rapidly growing, blue-black, dome-shaped bumps within a previous mole or previous area of normal skin  Other times, melanomas are suspected when a mole suddenly appears or changes  Itching, burning, or pain in a pigmented lesion should increase suspicion, but most patients with early melanoma have no skin discomfort whatsoever  Melanoma can occur anywhere on the skin, including areas that are difficult for self-examination   Many melanomas are first noticed by other family members  Suspicious-looking moles may be removed for microscopic examination  You may be able to prevent death from melanoma by doing two simple things:    1  Try to avoid unnecessary sun exposure and protect your skin when it is exposed to the sun  People who live near the equator, people who have intermittent exposures to large amounts of sun, and people who have had sunburns in childhood or adolescence have an increased risk for melanoma  Sun sense and vigilant sun protection may be keys to helping to prevent melanoma  We recommend wearing UPF-rated sun protective clothing and sunglasses whenever possible and applying a moisturizer-sunscreen combination product (SPF 50+) such as Neutrogena Daily Defense to sun exposed areas of skin at least three times a day  2  Have your moles regularly examined by a board certified dermatologist AND by yourself or a family member/friend at home  We recommend that you have your moles examined at least once a year by a board certified dermatologist   Use your birthday as an annual reminder to have your "Birthday Suit" (I e , your skin) examined; it is a nice birthday gift to yourself to know that your skin is healthy appearing! Additionally, at-home self examinations may be helpful for detecting a possible melanoma  Use the ABCDEs we discussed and check your moles once a month at home  2 NEOPLASM OF UNCERTAIN BEHAVIOR OF SKIN    Physical Exam:   (Anatomic Location); (Size and Morphological Description); (Differential Diagnosis):  o Left leg; 0 5 cm x 0 5 cm pink, brown, white papule; Differential diagnosis; Nevus vs  Other   Pertinent Positives:   Pertinent Negatives:     Additional History of Present Condition:  Present for a few years, Bleeds with trauma    Assessment and Plan:   I have discussed with the patient that a sample of skin via a "skin biopsy would be potentially helpful to further make a specific diagnosis under the microscope   Based on a thorough discussion of this condition and the management approach to it (including a comprehensive discussion of the known risks, side effects and potential benefits of treatment), the patient (family) agrees to implement the following specific plan:    o Procedure:  Skin Biopsy  After a thorough discussion of treatment options and risk/benefits/alternatives (including but not limited to local pain, scarring, dyspigmentation, blistering, possible superinfection, and inability to confirm a diagnosis via histopathology), verbal and written consent were obtained and portion of the rash was biopsied for tissue sample  See below for consent that was obtained from patient and subsequent Procedure Note  PROCEDURE SHAVE BIOPSY NOTE:     Performing Physician: Ashley Singh Anatomic Location; Clinical Description with size (cm); Pre-Op Diagnosis:   o Left leg; 0 5 cm x 0 5 cm pink, brown, white papule; Differential diagnosis; Nevus vs  Other   Post-op diagnosis: Same      Local anesthesia: 1% lidocaine (NO EPI-- pregnant)      Topical anesthesia: None     Hemostasis: Aluminum chloride       After obtaining informed consent  at which time there was a discussion about the purpose of biopsy  and low risks of infection and bleeding  The area was prepped and draped in the usual fashion  Anesthesia was obtained with 1% lidocaine with epinephrine  A shave biopsy to an appropriate sampling depth was obtained with a sterile blade (such as a 15-blade or DermaBlade)  The resulting wound was covered with surgical ointment and bandaged appropriately  The patient tolerated the procedure well without complications and was without signs of functional compromise  Specimen has been sent for review by Dermatopathology  Standard post-procedure care has been explained and has been included in written form within the patient's copy of Informed Consent      INFORMED CONSENT DISCUSSION AND POST-OPERATIVE INSTRUCTIONS FOR PATIENT    I   RATIONALE FOR PROCEDURE  I understand that a skin biopsy allows the Dermatologist to test a lesion or rash under the microscope to obtain a diagnosis  It usually involves numbing the area with numbing medication and removing a small piece of skin; sometimes the area will be closed with sutures  In this specific procedure, sutures are not usually needed  If any sutures are placed, then they are usually need to be removed in 2 weeks or less  I understand that my Dermatologist recommends that a skin "shave" biopsy be performed today  A local anesthetic, similar to the kind that a dentist uses when filling a cavity, will be injected with a very small needle into the skin area to be sampled  The injected skin and tissue underneath "will go to sleep and become numb so no pain should be felt afterwards  An instrument shaped like a tiny "razor blade" (shave biopsy instrument) will be used to cut a small piece of tissue and skin from the area so that a sample of tissue can be taken and examined more closely under the microscope  A slight amount of bleeding will occur, but it will be stopped with direct pressure and a pressure bandage and any other appropriate methods  I understands that a scar will form where the wound was created  Surgical ointment will be applied to help protect the wound  Sutures are not usually needed      II   RISKS AND POTENTIAL COMPLICATIONS   I understand the risks and potential complications of a skin biopsy include but are not limited to the following:   Bleeding   Infection   Pain   Scar/keloid   Skin discoloration   Incomplete Removal   Recurrence   Nerve Damage/Numbness/Loss of Function   Allergic Reaction to Anesthesia   Biopsies are diagnostic procedures and based on findings additional treatment or evaluation may be required   Loss or destruction of specimen resulting in no additional findings    My Dermatologist has explained to me the nature of the condition, the nature of the procedure, and the benefits to be reasonably expected compared with alternative approaches  My Dermatologist has discussed the likelihood of major risks or complications of this procedure including the specific risks listed above, such as bleeding, infection, and scarring/keloid  I understand that a scar is expected after this procedure  I understand that my physician cannot predict if the scar will form a "keloid," which extends beyond the borders of the wound that is created  A keloid is a thick, painful, and bumpy scar  A keloid can be difficult to treat, as it does not always respond well to therapy, which includes injecting cortisone directly into the keloid every few weeks  While this usually reduces the pain and size of the scar, it does not eliminate it  I understand that photographs may be taken before and after the procedure  These will be maintained as part of the medical providers confidential records and may not be made available to me  I further authorize the medical provider to use the photographs for teaching purposes or to illustrate scientific papers, books, or lectures if in his/her judgment, medical research, education, or science may benefit from its use  I have had an opportunity to fully inquire about the risks and benefits of this procedure and its alternatives  I have been given ample time and opportunity to ask questions and to seek a second opinion if I wished to do so  I acknowledge that there have specifically been no guarantees as to the cosmetic results from the procedure  I am aware that with any procedure there is always the possibility of an unexpected complication  III  POST-PROCEDURAL CARE (WHAT YOU WILL NEED TO DO "AFTER THE BIOPSY" TO OPTIMIZE HEALING)     Keep the area clean and dry  Try NOT to remove the bandage or get it wet for the first 24 hours       Gently clean the area and apply surgical ointment (such as Vaseline petrolatum ointment, which is available "over the counter" and not a prescription) to the biopsy site for up to 2 weeks straight  This acts to protect the wound from the outside world   Sutures are not usually placed in this procedure  If any sutures were placed, return for suture removal as instructed (generally 1 week for the face, 2 weeks for the body)   Take Acetaminophen (Tylenol) for discomfort, if no contraindications  Ibuprofen or aspirin could make bleeding worse   Call our office immediately for signs of infection: fever, chills, increased redness, warmth, tenderness, discomfort/pain, or pus or foul smell coming from the wound  WHAT TO DO IF THERE IS ANY BLEEDING? If a small amount of bleeding is noticed, place a clean cloth over the area and apply firm pressure for ten minutes  Check the wound after 10 minutes of direct pressure  If bleeding persists, try one more time for an additional 10 minutes of direct pressure on the area  If the bleeding becomes heavier or does not stop after the second attempt, or if you have any other questions about this procedure, then please call your [facility]atologist by calling [phone] (SKIN)  I hereby acknowledge that I have reviewed and verified the site with my Dermatologist and have requested and authorized my Dermatologist to proceed with the procedure        Scribe Attestation    I,:  Pj Dasilva MA am acting as a scribe while in the presence of the attending physician :       I,:  Kimberley Elizondo MD personally performed the services described in this documentation    as scribed in my presence :

## 2022-02-15 NOTE — PATIENT INSTRUCTIONS
1  MELANOCYTIC NEVI ("Moles")    Physical Exam:   Anatomic Location Affected:   Mostly on sun-exposed areas of the trunk and extremities   Morphological Description:  Scattered, 1-4mm round to ovoid, symmetrical-appearing, even bordered, skin colored to dark brown macules/papules, mostly in sun-exposed areas   Pertinent Positives:   Pertinent Negatives:No regional lymphadenopathy    Additional History of Present Condition:  Patient is currently 3 months pregnant and has noticed some of her moles changing    Assessment and Plan:  Based on a thorough discussion of this condition and the management approach to it (including a comprehensive discussion of the known risks, side effects and potential benefits of treatment), the patient (family) agrees to implement the following specific plan:   Observe for changes   Recommend sun protection SPF 30 or higher     Melanocytic Nevi  Melanocytic nevi ("moles") are tan or brown, raised or flat areas of the skin which have an increased number of melanocytes  Melanocytes are the cells in our body which make pigment and account for skin color  Some moles are present at birth (I e , "congenital nevi"), while others come up later in life (i e , "acquired nevi")  The sun can stimulate the body to make more moles  Sunburns are not the only thing that triggers more moles  Chronic sun exposure can do it too  Clinically distinguishing a healthy mole from melanoma may be difficult, even for experienced dermatologists  The "ABCDE's" of moles have been suggested as a means of helping to alert a person to a suspicious mole and the possible increased risk of melanoma  The suggestions for raising alert are as follows:    Asymmetry: Healthy moles tend to be symmetric, while melanomas are often asymmetric  Asymmetry means if you draw a line through the mole, the two halves do not match in color, size, shape, or surface texture   Asymmetry can be a result of rapid enlargement of a mole, the development of a raised area on a previously flat lesion, scaling, ulceration, bleeding or scabbing within the mole  Any mole that starts to demonstrate "asymmetry" should be examined promptly by a board certified dermatologist      Border: Healthy moles tend to have discrete, even borders  The border of a melanoma often blends into the normal skin and does not sharply delineate the mole from normal skin  Any mole that starts to demonstrate "uneven borders" should be examined promptly by a board certified dermatologist      Color: Healthy moles tend to be one color throughout  Melanomas tend to be made up of different colors ranging from dark black, blue, white, or red  Any mole that demonstrates a color change should be examined promptly by a board certified dermatologist      Diameter: Healthy moles tend to be smaller than 0 6 cm in size; an exception are "congenital nevi" that can be larger  Melanomas tend to grow and can often be greater than 0 6 cm (1/4 of an inch, or the size of a pencil eraser)  This is only a guideline, and many normal moles may be larger than 0 6 cm without being unhealthy  Any mole that starts to change in size (small to bigger or bigger to smaller) should be examined promptly by a board certified dermatologist      Evolving: Healthy moles tend to "stay the same "  Melanomas may often show signs of change or evolution such as a change in size, shape, color, or elevation  Any mole that starts to itch, bleed, crust, burn, hurt, or ulcerate or demonstrate a change or evolution should be examined promptly by a board certified dermatologist       Dysplastic Nevi  Dysplastic moles are moles that fit the ABCDE rules of melanoma but are not identified as melanomas when examined under the microscope  They may indicate an increased risk of melanoma in that person   If there is a family history of melanoma, most experts agree that the person may be at an increased risk for developing a melanoma  Experts still do not agree on what dysplastic moles mean in patients without a personal or family history of melanoma  Dysplastic moles are usually larger than common moles and have different colors within it with irregular borders  The appearance can be very similar to a melanoma  Biopsies of dysplastic moles may show abnormalities which are different from a regular mole  Melanoma  Malignant melanoma is a type of skin cancer that can be deadly if it spreads throughout the body  The incidence of melanoma in the [place] is growing faster than any other cancer  Melanoma usually grows near the surface of the skin for a period of time, and then begins to grow deeper into the skin  Once it grows deeper into the skin, the risk of spread to other organs greatly increases  Therefore, early detection and removal of a malignant melanoma may result in a better chance at a complete cure; removal after the tumor has spread may not be as effective, leading to worse clinical outcomes such as death  The true rate of nevus transformation into a melanoma is unknown  It has been estimated that the lifetime risk for any acquired melanocytic nevus on any 21year-old individual transforming into melanoma by age [de-identified] is 0 03% (1 in 3,164) for men and 0 009% (1 in 10,800) for women  The appearance of a "new mole" remains one of the most reliable methods for identifying a malignant melanoma  Occasionally, melanomas appear as rapidly growing, blue-black, dome-shaped bumps within a previous mole or previous area of normal skin  Other times, melanomas are suspected when a mole suddenly appears or changes  Itching, burning, or pain in a pigmented lesion should increase suspicion, but most patients with early melanoma have no skin discomfort whatsoever  Melanoma can occur anywhere on the skin, including areas that are difficult for self-examination   Many melanomas are first noticed by other family members  Suspicious-looking moles may be removed for microscopic examination  You may be able to prevent death from melanoma by doing two simple things:    1  Try to avoid unnecessary sun exposure and protect your skin when it is exposed to the sun  People who live near the equator, people who have intermittent exposures to large amounts of sun, and people who have had sunburns in childhood or adolescence have an increased risk for melanoma  Sun sense and vigilant sun protection may be keys to helping to prevent melanoma  We recommend wearing UPF-rated sun protective clothing and sunglasses whenever possible and applying a moisturizer-sunscreen combination product (SPF 50+) such as Neutrogena Daily Defense to sun exposed areas of skin at least three times a day  2  Have your moles regularly examined by a board certified dermatologist AND by yourself or a family member/friend at home  We recommend that you have your moles examined at least once a year by a board certified dermatologist   Use your birthday as an annual reminder to have your "Birthday Suit" (I e , your skin) examined; it is a nice birthday gift to yourself to know that your skin is healthy appearing! Additionally, at-home self examinations may be helpful for detecting a possible melanoma  Use the ABCDEs we discussed and check your moles once a month at home  2 NEOPLASM OF UNCERTAIN BEHAVIOR OF SKIN    Physical Exam:   (Anatomic Location); (Size and Morphological Description); (Differential Diagnosis):  o Left leg; 0 5 cm x 0 5 cm pink, brown, white papule; Differential diagnosis; Nevus vs  Other:      INFORMED CONSENT DISCUSSION AND POST-OPERATIVE INSTRUCTIONS FOR PATIENT    I   RATIONALE FOR PROCEDURE  I understand that a skin biopsy allows the Dermatologist to test a lesion or rash under the microscope to obtain a diagnosis    It usually involves numbing the area with numbing medication and removing a small piece of skin; sometimes the area will be closed with sutures  In this specific procedure, sutures are not usually needed  If any sutures are placed, then they are usually need to be removed in 2 weeks or less  I understand that my Dermatologist recommends that a skin "shave" biopsy be performed today  A local anesthetic, similar to the kind that a dentist uses when filling a cavity, will be injected with a very small needle into the skin area to be sampled  The injected skin and tissue underneath "will go to sleep and become numb so no pain should be felt afterwards  An instrument shaped like a tiny "razor blade" (shave biopsy instrument) will be used to cut a small piece of tissue and skin from the area so that a sample of tissue can be taken and examined more closely under the microscope  A slight amount of bleeding will occur, but it will be stopped with direct pressure and a pressure bandage and any other appropriate methods  I understands that a scar will form where the wound was created  Surgical ointment will be applied to help protect the wound  Sutures are not usually needed  II   RISKS AND POTENTIAL COMPLICATIONS   I understand the risks and potential complications of a skin biopsy include but are not limited to the following:   Bleeding   Infection   Pain   Scar/keloid   Skin discoloration   Incomplete Removal   Recurrence   Nerve Damage/Numbness/Loss of Function   Allergic Reaction to Anesthesia   Biopsies are diagnostic procedures and based on findings additional treatment or evaluation may be required   Loss or destruction of specimen resulting in no additional findings    My Dermatologist has explained to me the nature of the condition, the nature of the procedure, and the benefits to be reasonably expected compared with alternative approaches    My Dermatologist has discussed the likelihood of major risks or complications of this procedure including the specific risks listed above, such as bleeding, infection, and scarring/keloid  I understand that a scar is expected after this procedure  I understand that my physician cannot predict if the scar will form a "keloid," which extends beyond the borders of the wound that is created  A keloid is a thick, painful, and bumpy scar  A keloid can be difficult to treat, as it does not always respond well to therapy, which includes injecting cortisone directly into the keloid every few weeks  While this usually reduces the pain and size of the scar, it does not eliminate it  I understand that photographs may be taken before and after the procedure  These will be maintained as part of the medical providers confidential records and may not be made available to me  I further authorize the medical provider to use the photographs for teaching purposes or to illustrate scientific papers, books, or lectures if in his/her judgment, medical research, education, or science may benefit from its use  I have had an opportunity to fully inquire about the risks and benefits of this procedure and its alternatives  I have been given ample time and opportunity to ask questions and to seek a second opinion if I wished to do so  I acknowledge that there have specifically been no guarantees as to the cosmetic results from the procedure  I am aware that with any procedure there is always the possibility of an unexpected complication  III  POST-PROCEDURAL CARE (WHAT YOU WILL NEED TO DO "AFTER THE BIOPSY" TO OPTIMIZE HEALING)     Keep the area clean and dry  Try NOT to remove the bandage or get it wet for the first 24 hours   Gently clean the area and apply surgical ointment (such as Vaseline petrolatum ointment, which is available "over the counter" and not a prescription) to the biopsy site for up to 2 weeks straight  This acts to protect the wound from the outside world   Sutures are not usually placed in this procedure    If any sutures were placed, return for suture removal as instructed (generally 1 week for the face, 2 weeks for the body)   Take Acetaminophen (Tylenol) for discomfort, if no contraindications  Ibuprofen or aspirin could make bleeding worse   Call our office immediately for signs of infection: fever, chills, increased redness, warmth, tenderness, discomfort/pain, or pus or foul smell coming from the wound  WHAT TO DO IF THERE IS ANY BLEEDING? If a small amount of bleeding is noticed, place a clean cloth over the area and apply firm pressure for ten minutes  Check the wound after 10 minutes of direct pressure  If bleeding persists, try one more time for an additional 10 minutes of direct pressure on the area  If the bleeding becomes heavier or does not stop after the second attempt, or if you have any other questions about this procedure, then please call your [facility] Dermatologist by calling [phone] (SKIN)  I hereby acknowledge that I have reviewed and verified the site with my Dermatologist and have requested and authorized my Dermatologist to proceed with the procedure

## 2022-02-16 ENCOUNTER — TELEPHONE (OUTPATIENT)
Dept: PERINATAL CARE | Facility: OTHER | Age: 27
End: 2022-02-16

## 2022-02-16 LAB
BACTERIA UR CULT: NORMAL
C TRACH DNA SPEC QL NAA+PROBE: NEGATIVE
HIV 1+2 AB+HIV1 P24 AG SERPL QL IA: NORMAL
N GONORRHOEA DNA SPEC QL NAA+PROBE: NEGATIVE
RPR SER QL: NORMAL

## 2022-02-16 NOTE — TELEPHONE ENCOUNTER
Called patient to reschedule [facility] appointment cancelled in [facility]:    Appointment canceled for Lonnie Mendez ([id number])   Visit Type: [facility]   Date        Time      Length    Provider                  Department   2/24/2022    2:15 PM  45 mins  [facility]      Reason for Cancellation: Canceled via [address] with patient , pt declined to reschedule - she will keep LEVEL 2 US already scheduled

## 2022-03-17 ENCOUNTER — ROUTINE PRENATAL (OUTPATIENT)
Dept: OBGYN CLINIC | Facility: CLINIC | Age: 27
End: 2022-03-17

## 2022-03-17 VITALS
BODY MASS INDEX: 33.57 KG/M2 | DIASTOLIC BLOOD PRESSURE: 80 MMHG | WEIGHT: 171 LBS | HEIGHT: 60 IN | SYSTOLIC BLOOD PRESSURE: 122 MMHG

## 2022-03-17 DIAGNOSIS — O09.299 HX OF PREECLAMPSIA, PRIOR PREGNANCY, CURRENTLY PREGNANT: ICD-10-CM

## 2022-03-17 DIAGNOSIS — Z34.81 PRENATAL CARE, SUBSEQUENT PREGNANCY, FIRST TRIMESTER: ICD-10-CM

## 2022-03-17 DIAGNOSIS — Z34.82 ENCOUNTER FOR SUPERVISION OF OTHER NORMAL PREGNANCY IN SECOND TRIMESTER: Primary | ICD-10-CM

## 2022-03-17 PROBLEM — Z34.90 SUPERVISION OF NORMAL PREGNANCY: Status: ACTIVE | Noted: 2022-03-17

## 2022-03-17 LAB
SL AMB  POCT GLUCOSE, UA: NEGATIVE
SL AMB POCT URINE PROTEIN: NEGATIVE

## 2022-03-17 PROCEDURE — PNV: Performed by: OBSTETRICS & GYNECOLOGY

## 2022-03-17 RX ORDER — ASPIRIN 81 MG/1
162 TABLET ORAL DAILY
COMMUNITY

## 2022-03-17 NOTE — PROGRESS NOTES
Pt is here for routine ob visit   No concerns at this time  Urine neg/neg  No VB,Cramping  +FM   UTD flu/covid vaccines  Declined AFP

## 2022-03-17 NOTE — PROGRESS NOTES
is considering vasectomy for contraception  Problem List Items Addressed This Visit        Other    Hx of preeclampsia, prior pregnancy, currently pregnant     To start LDASA 162mg (2 x 81mg) ASAP  Prenatal care, subsequent pregnancy, first trimester     Level II is scheduled  Declined NT/MSAFP  PN panel complete, normal baseline PreE labs            Supervision of normal pregnancy - Primary    Relevant Orders    POCT urine dip (Completed)

## 2022-04-10 PROBLEM — O99.210 OBESITY AFFECTING PREGNANCY, ANTEPARTUM: Status: ACTIVE | Noted: 2022-04-10

## 2022-04-10 PROBLEM — Z3A.20 20 WEEKS GESTATION OF PREGNANCY: Status: ACTIVE | Noted: 2022-04-10

## 2022-04-11 ENCOUNTER — ROUTINE PRENATAL (OUTPATIENT)
Dept: PERINATAL CARE | Facility: OTHER | Age: 27
End: 2022-04-11
Payer: COMMERCIAL

## 2022-04-11 ENCOUNTER — TELEPHONE (OUTPATIENT)
Dept: PERINATAL CARE | Facility: OTHER | Age: 27
End: 2022-04-11

## 2022-04-11 VITALS
DIASTOLIC BLOOD PRESSURE: 72 MMHG | HEIGHT: 60 IN | BODY MASS INDEX: 34.04 KG/M2 | HEART RATE: 85 BPM | SYSTOLIC BLOOD PRESSURE: 135 MMHG | WEIGHT: 173.4 LBS

## 2022-04-11 DIAGNOSIS — O99.210 OBESITY AFFECTING PREGNANCY, ANTEPARTUM: Primary | ICD-10-CM

## 2022-04-11 DIAGNOSIS — Z13.79 GENETIC SCREENING: ICD-10-CM

## 2022-04-11 DIAGNOSIS — Z36.86 ENCOUNTER FOR ANTENATAL SCREENING FOR CERVICAL LENGTH: ICD-10-CM

## 2022-04-11 DIAGNOSIS — Z3A.20 20 WEEKS GESTATION OF PREGNANCY: ICD-10-CM

## 2022-04-11 PROCEDURE — 76817 TRANSVAGINAL US OBSTETRIC: CPT | Performed by: OBSTETRICS & GYNECOLOGY

## 2022-04-11 PROCEDURE — 99241 PR OFFICE CONSULTATION NEW/ESTAB PATIENT 15 MIN: CPT | Performed by: OBSTETRICS & GYNECOLOGY

## 2022-04-11 PROCEDURE — 76811 OB US DETAILED SNGL FETUS: CPT | Performed by: OBSTETRICS & GYNECOLOGY

## 2022-04-11 NOTE — PROGRESS NOTES
Ultrasound Probe Disinfection    A transvaginal ultrasound was performed  Prior to use, disinfection was performed with High Level Disinfection Process (Trophon)  Probe serial number [id number]: [id number] was used        Sonam Landa  04/11/22  1:57 PM

## 2022-04-11 NOTE — TELEPHONE ENCOUNTER
Called patient to schedule follow up appointment:     Return in about 12 weeks  (around 7/4/2022) for Level 1 ultrasound (growth)  Left voicemail request patient to call back to schedule at [phone]

## 2022-04-14 ENCOUNTER — ROUTINE PRENATAL (OUTPATIENT)
Dept: OBGYN CLINIC | Facility: CLINIC | Age: 27
End: 2022-04-14

## 2022-04-14 VITALS
SYSTOLIC BLOOD PRESSURE: 122 MMHG | WEIGHT: 173.4 LBS | BODY MASS INDEX: 34.04 KG/M2 | DIASTOLIC BLOOD PRESSURE: 80 MMHG | HEIGHT: 60 IN

## 2022-04-14 DIAGNOSIS — Z3A.20 20 WEEKS GESTATION OF PREGNANCY: ICD-10-CM

## 2022-04-14 DIAGNOSIS — Z34.82 ENCOUNTER FOR SUPERVISION OF OTHER NORMAL PREGNANCY IN SECOND TRIMESTER: Primary | ICD-10-CM

## 2022-04-14 LAB
SL AMB  POCT GLUCOSE, UA: NEGATIVE
SL AMB POCT URINE PROTEIN: NEGATIVE

## 2022-04-14 PROCEDURE — PNV: Performed by: OBSTETRICS & GYNECOLOGY

## 2022-04-14 NOTE — PROGRESS NOTES
Problem List Items Addressed This Visit        Other    Supervision of normal pregnancy - Primary    Relevant Orders    POCT urine dip (Completed)    20 weeks gestation of pregnancy     Level II US normal per pt report - not yet resulted  Sex is a surprise!   32wk growth scan is planned    TWG 9#

## 2022-04-14 NOTE — ASSESSMENT & PLAN NOTE
Level II US normal per pt report - not yet resulted  Sex is a surprise!   32wk growth scan is planned    TWG 9#

## 2022-04-14 NOTE — PROGRESS NOTES
Pt is here for routine ob visit   No concerns at this time  Urine neg/neg  No LOF,VB,Contractions  +FM   UTD flu/covid vaccines

## 2022-05-09 NOTE — PROGRESS NOTES
28 wk labs given today  Level 2 Completed  UTD Flu and Covid vaccinations  Denies LOF, VB, CTX    GFM!

## 2022-05-09 NOTE — PROGRESS NOTES
The patient was seen today for an ultrasound  Please see ultrasound report (located under Ob Procedures) for additional details  Thank you very much for allowing us to participate in the care of this very nice patient  Should you have any questions, please do not hesitate to contact me  Jonel Chavez MD [id number]  Attending Physician, Debbi

## 2022-05-10 ENCOUNTER — ROUTINE PRENATAL (OUTPATIENT)
Dept: OBGYN CLINIC | Facility: CLINIC | Age: 27
End: 2022-05-10

## 2022-05-10 VITALS — WEIGHT: 180.4 LBS | BODY MASS INDEX: 35.23 KG/M2 | DIASTOLIC BLOOD PRESSURE: 62 MMHG | SYSTOLIC BLOOD PRESSURE: 122 MMHG

## 2022-05-10 DIAGNOSIS — Z34.82 ENCOUNTER FOR SUPERVISION OF OTHER NORMAL PREGNANCY IN SECOND TRIMESTER: Primary | ICD-10-CM

## 2022-05-10 DIAGNOSIS — Z11.3 SCREENING FOR STD (SEXUALLY TRANSMITTED DISEASE): ICD-10-CM

## 2022-05-10 LAB
SL AMB  POCT GLUCOSE, UA: ABNORMAL
SL AMB POCT URINE PROTEIN: ABNORMAL

## 2022-05-10 PROCEDURE — PNV: Performed by: OBSTETRICS & GYNECOLOGY

## 2022-06-06 NOTE — PROGRESS NOTES
28 wk labs -?  UTD Flu and Covid vaccinations  Yellow Folder Given Today  Delivery Consent Signed today  Breast Pump Order placed today  Denies LOF, VB, CTX  GFM! Has a pediatrician

## 2022-06-07 ENCOUNTER — ROUTINE PRENATAL (OUTPATIENT)
Dept: OBGYN CLINIC | Facility: CLINIC | Age: 27
End: 2022-06-07

## 2022-06-07 VITALS — BODY MASS INDEX: 36.25 KG/M2 | WEIGHT: 185.6 LBS | DIASTOLIC BLOOD PRESSURE: 62 MMHG | SYSTOLIC BLOOD PRESSURE: 122 MMHG

## 2022-06-07 DIAGNOSIS — Z34.83 PRENATAL CARE, SUBSEQUENT PREGNANCY, THIRD TRIMESTER: Primary | ICD-10-CM

## 2022-06-07 LAB
SL AMB  POCT GLUCOSE, UA: NORMAL
SL AMB POCT URINE PROTEIN: NORMAL

## 2022-06-07 PROCEDURE — PNV: Performed by: OBSTETRICS & GYNECOLOGY

## 2022-06-15 ENCOUNTER — APPOINTMENT (OUTPATIENT)
Dept: LAB | Facility: CLINIC | Age: 27
End: 2022-06-15
Payer: COMMERCIAL

## 2022-06-15 DIAGNOSIS — Z11.3 SCREENING FOR STD (SEXUALLY TRANSMITTED DISEASE): ICD-10-CM

## 2022-06-15 DIAGNOSIS — Z34.82 ENCOUNTER FOR SUPERVISION OF OTHER NORMAL PREGNANCY IN SECOND TRIMESTER: ICD-10-CM

## 2022-06-15 LAB
ERYTHROCYTE [DISTWIDTH] IN BLOOD BY AUTOMATED COUNT: 13.5 % (ref 11.6–15.1)
GLUCOSE 1H P 50 G GLC PO SERPL-MCNC: 97 MG/DL (ref 40–134)
HCT VFR BLD AUTO: 38.3 % (ref 34.8–46.1)
HGB BLD-MCNC: 12.8 G/DL (ref 11.5–15.4)
MCH RBC QN AUTO: 30.1 PG (ref 26.8–34.3)
MCHC RBC AUTO-ENTMCNC: 33.4 G/DL (ref 31.4–37.4)
MCV RBC AUTO: 90 FL (ref 82–98)
PLATELET # BLD AUTO: 273 THOUSANDS/UL (ref 149–390)
PMV BLD AUTO: 11.4 FL (ref 8.9–12.7)
RBC # BLD AUTO: 4.25 MILLION/UL (ref 3.81–5.12)
RPR SER QL: NORMAL
WBC # BLD AUTO: 9.81 THOUSAND/UL (ref 4.31–10.16)

## 2022-06-15 PROCEDURE — 82950 GLUCOSE TEST: CPT

## 2022-06-15 PROCEDURE — 86592 SYPHILIS TEST NON-TREP QUAL: CPT

## 2022-06-15 PROCEDURE — 36415 COLL VENOUS BLD VENIPUNCTURE: CPT

## 2022-06-15 PROCEDURE — 85027 COMPLETE CBC AUTOMATED: CPT

## 2022-06-22 ENCOUNTER — ROUTINE PRENATAL (OUTPATIENT)
Dept: OBGYN CLINIC | Facility: CLINIC | Age: 27
End: 2022-06-22
Payer: COMMERCIAL

## 2022-06-22 VITALS — SYSTOLIC BLOOD PRESSURE: 122 MMHG | BODY MASS INDEX: 36.52 KG/M2 | DIASTOLIC BLOOD PRESSURE: 80 MMHG | WEIGHT: 187 LBS

## 2022-06-22 DIAGNOSIS — Z34.83 PRENATAL CARE, SUBSEQUENT PREGNANCY, THIRD TRIMESTER: Primary | ICD-10-CM

## 2022-06-22 DIAGNOSIS — O99.210 OBESITY AFFECTING PREGNANCY, ANTEPARTUM: ICD-10-CM

## 2022-06-22 DIAGNOSIS — Z34.83 MULTIGRAVIDA IN THIRD TRIMESTER: ICD-10-CM

## 2022-06-22 DIAGNOSIS — O09.299 HX OF PREECLAMPSIA, PRIOR PREGNANCY, CURRENTLY PREGNANT: ICD-10-CM

## 2022-06-22 PROBLEM — Z3A.30 30 WEEKS GESTATION OF PREGNANCY: Status: ACTIVE | Noted: 2022-04-10

## 2022-06-22 PROBLEM — Z34.90 SUPERVISION OF NORMAL PREGNANCY: Status: RESOLVED | Noted: 2022-03-17 | Resolved: 2022-06-22

## 2022-06-22 LAB
SL AMB  POCT GLUCOSE, UA: NORMAL
SL AMB POCT URINE PROTEIN: NORMAL

## 2022-06-22 PROCEDURE — 90715 TDAP VACCINE 7 YRS/> IM: CPT

## 2022-06-22 PROCEDURE — PNV: Performed by: PHYSICIAN ASSISTANT

## 2022-06-22 PROCEDURE — 90471 IMMUNIZATION ADMIN: CPT

## 2022-06-22 NOTE — PROGRESS NOTES
Problem List Items Addressed This Visit        Other    Hx of preeclampsia, prior pregnancy, currently pregnant - Primary     Maintained on aspirin 162mg po daily  Normotensive today            Multigravida in third trimester     31 y o  female here for routine PN visit at [address]  well overall  Good fetal movement  First OB labs - normal   Gc/chlamydia - 2/15/22 neg  Pap - 12/29/20 neg  Blue folder - has  Genetic screening - declined  28 week labs - normal   COVID vaccine - yes  Flu shot - yes   TDAP - yes  Yellow folder - has  Breast pump - has  Pediatrician - has  Perineal massage - has paper  Delivery consent - previously signed    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

## 2022-06-22 NOTE — PROGRESS NOTES
Feels well, she has no complaints  Nl FM  Denies any bleeding or cramping   Tdap vaccine given today

## 2022-06-22 NOTE — ASSESSMENT & PLAN NOTE
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

## 2022-07-05 NOTE — PROGRESS NOTES
28 wk labs completed  1hr GTT= 97  UTD Tdap, Flu and Covid  vaccinations  Delivery Consent Signed previously  Has a breast pump  Has a pediatrician  Birth Plan Not returned  Denies LOF, VB  Acid Reflux- taking Tums  Increased vaginal pressure and cramping laura at night  GFM!

## 2022-07-06 ENCOUNTER — ROUTINE PRENATAL (OUTPATIENT)
Dept: OBGYN CLINIC | Facility: CLINIC | Age: 27
End: 2022-07-06

## 2022-07-06 VITALS — SYSTOLIC BLOOD PRESSURE: 104 MMHG | WEIGHT: 190 LBS | DIASTOLIC BLOOD PRESSURE: 62 MMHG | BODY MASS INDEX: 37.11 KG/M2

## 2022-07-06 DIAGNOSIS — Z34.83 PRENATAL CARE, SUBSEQUENT PREGNANCY, THIRD TRIMESTER: Primary | ICD-10-CM

## 2022-07-06 LAB
SL AMB  POCT GLUCOSE, UA: NORMAL
SL AMB POCT URINE PROTEIN: NORMAL

## 2022-07-06 PROCEDURE — PNV: Performed by: OBSTETRICS & GYNECOLOGY

## 2022-07-06 RX ORDER — CALCIUM CARBONATE 200(500)MG
1 TABLET,CHEWABLE ORAL DAILY
COMMUNITY
End: 2022-10-25

## 2022-07-15 ENCOUNTER — ULTRASOUND (OUTPATIENT)
Dept: PERINATAL CARE | Facility: OTHER | Age: 27
End: 2022-07-15
Payer: COMMERCIAL

## 2022-07-15 VITALS
DIASTOLIC BLOOD PRESSURE: 62 MMHG | HEIGHT: 61 IN | BODY MASS INDEX: 36.06 KG/M2 | WEIGHT: 191 LBS | SYSTOLIC BLOOD PRESSURE: 123 MMHG | HEART RATE: 93 BPM

## 2022-07-15 DIAGNOSIS — O99.210 OBESITY AFFECTING PREGNANCY, ANTEPARTUM: ICD-10-CM

## 2022-07-15 DIAGNOSIS — O09.299 HX OF PREECLAMPSIA, PRIOR PREGNANCY, CURRENTLY PREGNANT: ICD-10-CM

## 2022-07-15 DIAGNOSIS — O09.893 SHORT INTERVAL BETWEEN PREGNANCIES COMPLICATING PREGNANCY IN THIRD TRIMESTER, ANTEPARTUM: Primary | ICD-10-CM

## 2022-07-15 DIAGNOSIS — Z3A.33 33 WEEKS GESTATION OF PREGNANCY: ICD-10-CM

## 2022-07-15 PROCEDURE — 76816 OB US FOLLOW-UP PER FETUS: CPT | Performed by: OBSTETRICS & GYNECOLOGY

## 2022-07-15 PROCEDURE — 99213 OFFICE O/P EST LOW 20 MIN: CPT | Performed by: OBSTETRICS & GYNECOLOGY

## 2022-07-15 NOTE — PROGRESS NOTES
Ladonna Meza has no complaints today  She reports regular fetal movements and does not report any problems  She is here today at 33w6d for an ultrasound for fetal growth  Total weight gain in pregnancy is 27 pounds  Normal 28 week labs  Problem list:  1  Short interval pregnancy  2  Pre gravid BMI of 31  3  Preeclampsia at term requiring induction at 37 weeks and 6 days  Baby weighed 5 pounds 1 ounce  4  Declined genetic screening    Ultrasound findings: The ultrasound today shows normal interval fetal growth and fluid  Pregnancy ultrasound has limitations and is unable to detect all forms of fetal congenital abnormalities  The inaccuracy in the EFW can be off by 1 lb either way in the third trimester  Follow up recommended:   1  No further follow-up ultrasounds are recommended  2  She can be encouraged to stop her baby aspirin at 36 weeks  Pre visit time reviewing her records   5 minutes  Face to face time 5 minutes  Post visit time on documentation of note, updating her problem list, adding orders and prescriptions 5 minutes  Procedures that were completed today were charged separately  The level of decision making was straight forward      Hilda Cottrell MD

## 2022-07-15 NOTE — LETTER
July 15, 2022     Dolores Sheets, [address]    Patient: Glen Goodwin   YOB: 1995   Date of Visit: 7/15/2022       Dear Dr Paola Lemos: Thank you for referring Glen Goodwin to me for evaluation  Below are my notes for this consultation  If you have questions, please do not hesitate to call me  I look forward to following your patient along with you  Sincerely,        Chad Marrero MD        CC: No Recipients  Chad Marrero MD  7/15/2022  8:50 AM  Sign when Signing Visit  Glen Goodwin has no complaints today  She reports regular fetal movements and does not report any problems  She is here today at 33w6d for an ultrasound for fetal growth  Total weight gain in pregnancy is 27 pounds  Normal 28 week labs  Problem list:  1  Short interval pregnancy  2  Pre gravid BMI of 31  3  Preeclampsia at term requiring induction at 37 weeks and 6 days  Baby weighed 5 pounds 1 ounce  4  Declined genetic screening    Ultrasound findings: The ultrasound today shows normal interval fetal growth and fluid  Pregnancy ultrasound has limitations and is unable to detect all forms of fetal congenital abnormalities  The inaccuracy in the EFW can be off by 1 lb either way in the third trimester  Follow up recommended:   1  No further follow-up ultrasounds are recommended  2  She can be encouraged to stop her baby aspirin at 36 weeks  Pre visit time reviewing her records   5 minutes  Face to face time 5 minutes  Post visit time on documentation of note, updating her problem list, adding orders and prescriptions 5 minutes  Procedures that were completed today were charged separately  The level of decision making was straight forward      Chad Marrero MD

## 2022-07-18 NOTE — PROGRESS NOTES
UTD Tdap, Flu and Covid  vaccinations  Delivery Consent Signed previously  Has a breast pump  Has a pediatrician  Birth Plan Not returned  Denies LOF, VB  Acid Reflux is worse at night- taking Tums  Increased vaginal pressure and cramping continues laura at night  Denies LOF, VB, CTX  +FM!

## 2022-07-19 ENCOUNTER — ROUTINE PRENATAL (OUTPATIENT)
Dept: OBGYN CLINIC | Facility: CLINIC | Age: 27
End: 2022-07-19

## 2022-07-19 VITALS — BODY MASS INDEX: 36.13 KG/M2 | WEIGHT: 191.2 LBS | SYSTOLIC BLOOD PRESSURE: 120 MMHG | DIASTOLIC BLOOD PRESSURE: 82 MMHG

## 2022-07-19 DIAGNOSIS — Z34.83 PRENATAL CARE, SUBSEQUENT PREGNANCY, THIRD TRIMESTER: Primary | ICD-10-CM

## 2022-07-19 LAB
SL AMB  POCT GLUCOSE, UA: NORMAL
SL AMB POCT URINE PROTEIN: NORMAL

## 2022-07-19 PROCEDURE — PNV: Performed by: OBSTETRICS & GYNECOLOGY

## 2022-08-01 NOTE — PROGRESS NOTES
GBS TODAY! UTD Tdap, Flu and Covid  vaccinations  Delivery Consent Signed previously  Has a breast pump    Birth Plan Not returned  Acid Reflux is worse at night- taking Tums  Increased vaginal pressure and cramping and back pain continues  Denies VB, CTX  Lower abdominal cramping  Increased vaginal d/c white- clear in color  +FM!

## 2022-08-02 ENCOUNTER — ROUTINE PRENATAL (OUTPATIENT)
Dept: OBGYN CLINIC | Facility: CLINIC | Age: 27
End: 2022-08-02

## 2022-08-02 VITALS — WEIGHT: 191.2 LBS | DIASTOLIC BLOOD PRESSURE: 72 MMHG | SYSTOLIC BLOOD PRESSURE: 114 MMHG | BODY MASS INDEX: 36.13 KG/M2

## 2022-08-02 DIAGNOSIS — Z34.83 PRENATAL CARE, SUBSEQUENT PREGNANCY, THIRD TRIMESTER: Primary | ICD-10-CM

## 2022-08-02 LAB
SL AMB  POCT GLUCOSE, UA: NORMAL
SL AMB POCT URINE PROTEIN: NORMAL

## 2022-08-02 PROCEDURE — 87150 DNA/RNA AMPLIFIED PROBE: CPT | Performed by: OBSTETRICS & GYNECOLOGY

## 2022-08-02 PROCEDURE — PNV: Performed by: OBSTETRICS & GYNECOLOGY

## 2022-08-04 LAB — GP B STREP DNA SPEC QL NAA+PROBE: POSITIVE

## 2022-08-09 ENCOUNTER — ROUTINE PRENATAL (OUTPATIENT)
Dept: OBGYN CLINIC | Facility: CLINIC | Age: 27
End: 2022-08-09

## 2022-08-09 VITALS — DIASTOLIC BLOOD PRESSURE: 62 MMHG | BODY MASS INDEX: 36.43 KG/M2 | SYSTOLIC BLOOD PRESSURE: 104 MMHG | WEIGHT: 192.8 LBS

## 2022-08-09 DIAGNOSIS — Z34.83 MULTIGRAVIDA IN THIRD TRIMESTER: Primary | ICD-10-CM

## 2022-08-09 PROCEDURE — PNV: Performed by: OBSTETRICS & GYNECOLOGY

## 2022-08-09 NOTE — PROGRESS NOTES
GBS TODAY POSITIVE  UTD Tdap, Flu and Covid  vaccinations  Delivery Consent Signed previously  Has a breast pump    Birth Plan Not returned  Denies LOF, VB  CTX  Every 30 seconds lasting an hour yesterday and today     +FM

## 2022-08-10 PROBLEM — Z3A.37 37 WEEKS GESTATION OF PREGNANCY: Status: ACTIVE | Noted: 2022-04-10

## 2022-08-15 NOTE — PROGRESS NOTES
GBS = POSITIVE  UTD Tdap, Flu and Covid  vaccinations  Delivery Consent Signed previously  Has a breast pump    CTX daily, lasting an hour     Increased vaginal pressure  Denies LOF, VB  +FM

## 2022-08-16 ENCOUNTER — ROUTINE PRENATAL (OUTPATIENT)
Dept: OBGYN CLINIC | Facility: CLINIC | Age: 27
End: 2022-08-16

## 2022-08-16 ENCOUNTER — TELEPHONE (OUTPATIENT)
Dept: OBGYN CLINIC | Facility: CLINIC | Age: 27
End: 2022-08-16

## 2022-08-16 VITALS — BODY MASS INDEX: 36.73 KG/M2 | SYSTOLIC BLOOD PRESSURE: 122 MMHG | WEIGHT: 194.4 LBS | DIASTOLIC BLOOD PRESSURE: 72 MMHG

## 2022-08-16 DIAGNOSIS — Z34.83 PRENATAL CARE, SUBSEQUENT PREGNANCY, THIRD TRIMESTER: Primary | ICD-10-CM

## 2022-08-16 LAB
SL AMB  POCT GLUCOSE, UA: NORMAL
SL AMB POCT URINE PROTEIN: NORMAL

## 2022-08-16 PROCEDURE — PNV: Performed by: OBSTETRICS & GYNECOLOGY

## 2022-08-16 NOTE — TELEPHONE ENCOUNTER
----- Message from Leopold Leriche, MD sent at 8/16/2022 10:57 AM EDT -----  Regarding: IOL  Procedure to be scheduled (IOL or CS): IOL  MARIBEL: Estimated Date of Delivery: 8/27/22  Indication for delivery: elective  Requested date (s) of delivery: 8/21/2022    If requested date is unavailable, is there a date by which the pt must be delivered?  9/3/2022  Physician preference: none    If IOL, anticipated method: busby bulb + cytotec  If CS, with or without tubal: no

## 2022-08-22 ENCOUNTER — ROUTINE PRENATAL (OUTPATIENT)
Dept: OBGYN CLINIC | Facility: CLINIC | Age: 27
End: 2022-08-22

## 2022-08-22 VITALS — WEIGHT: 193.4 LBS | BODY MASS INDEX: 36.54 KG/M2 | SYSTOLIC BLOOD PRESSURE: 112 MMHG | DIASTOLIC BLOOD PRESSURE: 64 MMHG

## 2022-08-22 DIAGNOSIS — Z34.83 PRENATAL CARE, SUBSEQUENT PREGNANCY, THIRD TRIMESTER: Primary | ICD-10-CM

## 2022-08-22 LAB
SL AMB  POCT GLUCOSE, UA: NORMAL
SL AMB POCT URINE PROTEIN: NORMAL

## 2022-08-22 PROCEDURE — PNV: Performed by: OBSTETRICS & GYNECOLOGY

## 2022-08-22 NOTE — PROGRESS NOTES
Good FM   + BH's  Will schedule eIOL  Continue FKC's  Detail Level: Zone Render In Strict Bullet Format?: No Initiate Treatment: metronidazole 0.75 % topical cream twice daily

## 2022-08-24 NOTE — TELEPHONE ENCOUNTER
IOL scheduled for 8/31/22 at 3 pm  Patient notified of IOL date,time,and location  Advised patient she may eat a light lunch prior to going to L&D  In the interim please report any vaginal bleeding,leakage of fluid,decreased fetal movement or contractions  Reviewed fetal kick counts  Advised to keep all upcoming prenatal visits  Patient verbalizes understanding  Routed to on call providers as BRENT

## 2022-08-27 ENCOUNTER — NURSE TRIAGE (OUTPATIENT)
Dept: OTHER | Facility: OTHER | Age: 27
End: 2022-08-27

## 2022-08-28 ENCOUNTER — ANESTHESIA (INPATIENT)
Dept: ANESTHESIOLOGY | Facility: HOSPITAL | Age: 27
End: 2022-08-28
Payer: COMMERCIAL

## 2022-08-28 ENCOUNTER — ANESTHESIA EVENT (INPATIENT)
Dept: ANESTHESIOLOGY | Facility: HOSPITAL | Age: 27
End: 2022-08-28
Payer: COMMERCIAL

## 2022-08-28 ENCOUNTER — HOSPITAL ENCOUNTER (INPATIENT)
Facility: HOSPITAL | Age: 27
LOS: 2 days | Discharge: HOME | End: 2022-08-30
Attending: STUDENT IN AN ORGANIZED HEALTH CARE EDUCATION/TRAINING PROGRAM | Admitting: STUDENT IN AN ORGANIZED HEALTH CARE EDUCATION/TRAINING PROGRAM
Payer: COMMERCIAL

## 2022-08-28 DIAGNOSIS — Z3A.37 37 WEEKS GESTATION OF PREGNANCY: Primary | ICD-10-CM

## 2022-08-28 PROBLEM — Z3A.40 40 WEEKS GESTATION OF PREGNANCY: Status: ACTIVE | Noted: 2022-04-10

## 2022-08-28 PROBLEM — Z22.330 GBS CARRIER: Status: ACTIVE | Noted: 2022-08-28

## 2022-08-28 LAB
ABO GROUP BLD: NORMAL
ALBUMIN SERPL BCP-MCNC: 2.9 G/DL (ref 3.5–5)
ALP SERPL-CCNC: 172 U/L (ref 34–104)
ALT SERPL W P-5'-P-CCNC: 12 U/L (ref 7–52)
ANION GAP SERPL CALCULATED.3IONS-SCNC: 11 MMOL/L (ref 4–13)
AST SERPL W P-5'-P-CCNC: 20 U/L (ref 13–39)
BASE EXCESS BLDCOA CALC-SCNC: -5.1 MMOL/L (ref 3–11)
BASE EXCESS BLDCOV CALC-SCNC: -4.4 MMOL/L (ref 1–9)
BILIRUB SERPL-MCNC: 0.79 MG/DL (ref 0.2–1)
BLD GP AB SCN SERPL QL: NEGATIVE
BUN SERPL-MCNC: 6 MG/DL (ref 5–25)
CALCIUM ALBUM COR SERPL-MCNC: 9.1 MG/DL (ref 8.3–10.1)
CALCIUM SERPL-MCNC: 8.2 MG/DL (ref 8.4–10.2)
CHLORIDE SERPL-SCNC: 107 MMOL/L (ref 96–108)
CO2 SERPL-SCNC: 18 MMOL/L (ref 21–32)
CREAT SERPL-MCNC: 0.78 MG/DL (ref 0.6–1.3)
ERYTHROCYTE [DISTWIDTH] IN BLOOD BY AUTOMATED COUNT: 14.4 % (ref 11.6–15.1)
GFR SERPL CREATININE-BSD FRML MDRD: 104 ML/MIN/1.73SQ M
GLUCOSE SERPL-MCNC: 125 MG/DL (ref 65–140)
HCO3 BLDCOA-SCNC: 18.6 MMOL/L (ref 17.3–27.3)
HCO3 BLDCOV-SCNC: 20.1 MMOL/L (ref 12.2–28.6)
HCT VFR BLD AUTO: 42.5 % (ref 34.8–46.1)
HGB BLD-MCNC: 14.1 G/DL (ref 11.5–15.4)
MCH RBC QN AUTO: 28 PG (ref 26.8–34.3)
MCHC RBC AUTO-ENTMCNC: 33.2 G/DL (ref 31.4–37.4)
MCV RBC AUTO: 84 FL (ref 82–98)
O2 CT VFR BLDCOA CALC: 14.2 ML/DL
OXYHGB MFR BLDCOA: 72.7 %
OXYHGB MFR BLDCOV: 52.8 %
PCO2 BLDCOA: 31.2 MM[HG] (ref 30–60)
PCO2 BLDCOV: 35.4 MM HG (ref 27–43)
PH BLDCOA: 7.39 [PH] (ref 7.23–7.43)
PH BLDCOV: 7.37 [PH] (ref 7.19–7.49)
PLATELET # BLD AUTO: 249 THOUSANDS/UL (ref 149–390)
PMV BLD AUTO: 12.2 FL (ref 8.9–12.7)
PO2 BLDCOA: 29.7 MM HG (ref 5–25)
PO2 BLDCOV: 22.7 MM HG (ref 15–45)
POTASSIUM SERPL-SCNC: 3.4 MMOL/L (ref 3.5–5.3)
PROT SERPL-MCNC: 5.4 G/DL (ref 6.4–8.4)
RBC # BLD AUTO: 5.04 MILLION/UL (ref 3.81–5.12)
RH BLD: POSITIVE
SAO2 % BLDCOV: 9.9 ML/DL
SODIUM SERPL-SCNC: 136 MMOL/L (ref 135–147)
SPECIMEN EXPIRATION DATE: NORMAL
WBC # BLD AUTO: 12.69 THOUSAND/UL (ref 4.31–10.16)

## 2022-08-28 PROCEDURE — 0HQ9XZZ REPAIR PERINEUM SKIN, EXTERNAL APPROACH: ICD-10-PCS | Performed by: OBSTETRICS & GYNECOLOGY

## 2022-08-28 PROCEDURE — 59400 OBSTETRICAL CARE: CPT | Performed by: OBSTETRICS & GYNECOLOGY

## 2022-08-28 PROCEDURE — 86592 SYPHILIS TEST NON-TREP QUAL: CPT

## 2022-08-28 PROCEDURE — 88307 TISSUE EXAM BY PATHOLOGIST: CPT | Performed by: SPECIALIST

## 2022-08-28 PROCEDURE — 86850 RBC ANTIBODY SCREEN: CPT

## 2022-08-28 PROCEDURE — 82805 BLOOD GASES W/O2 SATURATION: CPT | Performed by: STUDENT IN AN ORGANIZED HEALTH CARE EDUCATION/TRAINING PROGRAM

## 2022-08-28 PROCEDURE — 4A1HXCZ MONITORING OF PRODUCTS OF CONCEPTION, CARDIAC RATE, EXTERNAL APPROACH: ICD-10-PCS | Performed by: OBSTETRICS & GYNECOLOGY

## 2022-08-28 PROCEDURE — 86901 BLOOD TYPING SEROLOGIC RH(D): CPT

## 2022-08-28 PROCEDURE — 85027 COMPLETE CBC AUTOMATED: CPT

## 2022-08-28 PROCEDURE — 80053 COMPREHEN METABOLIC PANEL: CPT | Performed by: OBSTETRICS & GYNECOLOGY

## 2022-08-28 PROCEDURE — NC001 PR NO CHARGE: Performed by: STUDENT IN AN ORGANIZED HEALTH CARE EDUCATION/TRAINING PROGRAM

## 2022-08-28 PROCEDURE — 99214 OFFICE O/P EST MOD 30 MIN: CPT

## 2022-08-28 PROCEDURE — 86900 BLOOD TYPING SEROLOGIC ABO: CPT

## 2022-08-28 RX ORDER — DIAPER,BRIEF,INFANT-TODD,DISP
1 EACH MISCELLANEOUS DAILY PRN
Status: DISCONTINUED | OUTPATIENT
Start: 2022-08-28 | End: 2022-08-30 | Stop reason: HOSPADM

## 2022-08-28 RX ORDER — FAMOTIDINE 10 MG/ML
20 INJECTION, SOLUTION INTRAVENOUS DAILY
Status: DISCONTINUED | OUTPATIENT
Start: 2022-08-28 | End: 2022-08-28

## 2022-08-28 RX ORDER — SODIUM CHLORIDE, SODIUM LACTATE, POTASSIUM CHLORIDE, CALCIUM CHLORIDE 600; 310; 30; 20 MG/100ML; MG/100ML; MG/100ML; MG/100ML
50 INJECTION, SOLUTION INTRAVENOUS CONTINUOUS
Status: DISCONTINUED | OUTPATIENT
Start: 2022-08-28 | End: 2022-08-30 | Stop reason: HOSPADM

## 2022-08-28 RX ORDER — ACETAMINOPHEN 325 MG/1
650 TABLET ORAL EVERY 6 HOURS PRN
Status: DISCONTINUED | OUTPATIENT
Start: 2022-08-28 | End: 2022-08-30 | Stop reason: HOSPADM

## 2022-08-28 RX ORDER — SIMETHICONE 80 MG
80 TABLET,CHEWABLE ORAL EVERY 6 HOURS PRN
Status: DISCONTINUED | OUTPATIENT
Start: 2022-08-28 | End: 2022-08-30 | Stop reason: HOSPADM

## 2022-08-28 RX ORDER — CALCIUM CARBONATE 200(500)MG
1000 TABLET,CHEWABLE ORAL 3 TIMES DAILY PRN
Status: DISCONTINUED | OUTPATIENT
Start: 2022-08-28 | End: 2022-08-30 | Stop reason: HOSPADM

## 2022-08-28 RX ORDER — IBUPROFEN 600 MG/1
600 TABLET ORAL EVERY 6 HOURS PRN
Status: DISCONTINUED | OUTPATIENT
Start: 2022-08-28 | End: 2022-08-30 | Stop reason: HOSPADM

## 2022-08-28 RX ORDER — OXYTOCIN/RINGER'S LACTATE 30/500 ML
PLASTIC BAG, INJECTION (ML) INTRAVENOUS
Status: COMPLETED
Start: 2022-08-28 | End: 2022-08-28

## 2022-08-28 RX ORDER — BISACODYL 10 MG
10 SUPPOSITORY, RECTAL RECTAL DAILY PRN
Status: DISCONTINUED | OUTPATIENT
Start: 2022-08-28 | End: 2022-08-30 | Stop reason: HOSPADM

## 2022-08-28 RX ORDER — ONDANSETRON 2 MG/ML
4 INJECTION INTRAMUSCULAR; INTRAVENOUS EVERY 6 HOURS PRN
Status: DISCONTINUED | OUTPATIENT
Start: 2022-08-28 | End: 2022-08-28

## 2022-08-28 RX ORDER — FAMOTIDINE 10 MG/ML
INJECTION, SOLUTION INTRAVENOUS
Status: COMPLETED
Start: 2022-08-28 | End: 2022-08-28

## 2022-08-28 RX ORDER — BUPIVACAINE HYDROCHLORIDE 2.5 MG/ML
INJECTION, SOLUTION EPIDURAL; INFILTRATION; INTRACAUDAL AS NEEDED
Status: DISCONTINUED | OUTPATIENT
Start: 2022-08-28 | End: 2022-08-28 | Stop reason: HOSPADM

## 2022-08-28 RX ORDER — SODIUM CHLORIDE, SODIUM LACTATE, POTASSIUM CHLORIDE, CALCIUM CHLORIDE 600; 310; 30; 20 MG/100ML; MG/100ML; MG/100ML; MG/100ML
125 INJECTION, SOLUTION INTRAVENOUS CONTINUOUS
Status: DISCONTINUED | OUTPATIENT
Start: 2022-08-28 | End: 2022-08-28

## 2022-08-28 RX ORDER — ONDANSETRON 2 MG/ML
4 INJECTION INTRAMUSCULAR; INTRAVENOUS EVERY 4 HOURS PRN
Status: DISCONTINUED | OUTPATIENT
Start: 2022-08-28 | End: 2022-08-28

## 2022-08-28 RX ORDER — DIPHENHYDRAMINE HYDROCHLORIDE 50 MG/ML
25 INJECTION INTRAMUSCULAR; INTRAVENOUS EVERY 6 HOURS PRN
Status: DISCONTINUED | OUTPATIENT
Start: 2022-08-28 | End: 2022-08-28

## 2022-08-28 RX ORDER — ACETAMINOPHEN 325 MG/1
650 TABLET ORAL EVERY 6 HOURS PRN
Status: DISCONTINUED | OUTPATIENT
Start: 2022-08-28 | End: 2022-08-28

## 2022-08-28 RX ORDER — ONDANSETRON 2 MG/ML
4 INJECTION INTRAMUSCULAR; INTRAVENOUS EVERY 6 HOURS PRN
Status: DISCONTINUED | OUTPATIENT
Start: 2022-08-28 | End: 2022-08-30 | Stop reason: HOSPADM

## 2022-08-28 RX ORDER — OXYTOCIN/RINGER'S LACTATE 30/500 ML
62.5 PLASTIC BAG, INJECTION (ML) INTRAVENOUS ONCE
Status: COMPLETED | OUTPATIENT
Start: 2022-08-28 | End: 2022-08-29

## 2022-08-28 RX ORDER — DIPHENHYDRAMINE HCL 25 MG
25 TABLET ORAL EVERY 6 HOURS PRN
Status: DISCONTINUED | OUTPATIENT
Start: 2022-08-28 | End: 2022-08-30 | Stop reason: HOSPADM

## 2022-08-28 RX ORDER — METOCLOPRAMIDE HYDROCHLORIDE 5 MG/ML
5 INJECTION INTRAMUSCULAR; INTRAVENOUS EVERY 6 HOURS PRN
Status: DISCONTINUED | OUTPATIENT
Start: 2022-08-28 | End: 2022-08-28

## 2022-08-28 RX ORDER — DOCUSATE SODIUM 100 MG/1
100 CAPSULE, LIQUID FILLED ORAL 2 TIMES DAILY
Status: DISCONTINUED | OUTPATIENT
Start: 2022-08-28 | End: 2022-08-30 | Stop reason: HOSPADM

## 2022-08-28 RX ADMIN — SODIUM CHLORIDE, SODIUM LACTATE, POTASSIUM CHLORIDE, AND CALCIUM CHLORIDE 125 ML/HR: .6; .31; .03; .02 INJECTION, SOLUTION INTRAVENOUS at 02:20

## 2022-08-28 RX ADMIN — ROPIVACAINE HYDROCHLORIDE: 2 INJECTION, SOLUTION EPIDURAL; INFILTRATION at 03:10

## 2022-08-28 RX ADMIN — ROPIVACAINE HYDROCHLORIDE: 2 INJECTION, SOLUTION EPIDURAL; INFILTRATION at 09:53

## 2022-08-28 RX ADMIN — AMPICILLIN SODIUM AND SULBACTAM SODIUM 3 G: 100; 50 INJECTION, POWDER, FOR SOLUTION INTRAMUSCULAR; INTRAVENOUS at 23:49

## 2022-08-28 RX ADMIN — SODIUM CHLORIDE 2.5 MILLION UNITS: 9 INJECTION, SOLUTION INTRAVENOUS at 09:54

## 2022-08-28 RX ADMIN — METOCLOPRAMIDE 5 MG: 5 INJECTION, SOLUTION INTRAMUSCULAR; INTRAVENOUS at 09:54

## 2022-08-28 RX ADMIN — HYDROCORTISONE 1 APPLICATION: 1 CREAM TOPICAL at 13:43

## 2022-08-28 RX ADMIN — Medication 250 UNITS: at 12:39

## 2022-08-28 RX ADMIN — SODIUM CHLORIDE, SODIUM LACTATE, POTASSIUM CHLORIDE, AND CALCIUM CHLORIDE 125 ML/HR: .6; .31; .03; .02 INJECTION, SOLUTION INTRAVENOUS at 11:23

## 2022-08-28 RX ADMIN — Medication 62.5 MILLI-UNITS/MIN: at 14:56

## 2022-08-28 RX ADMIN — AMPICILLIN SODIUM AND SULBACTAM SODIUM 3 G: 100; 50 INJECTION, POWDER, FOR SOLUTION INTRAMUSCULAR; INTRAVENOUS at 18:12

## 2022-08-28 RX ADMIN — SODIUM CHLORIDE 2.5 MILLION UNITS: 9 INJECTION, SOLUTION INTRAVENOUS at 06:18

## 2022-08-28 RX ADMIN — SODIUM CHLORIDE, SODIUM LACTATE, POTASSIUM CHLORIDE, AND CALCIUM CHLORIDE 125 ML/HR: .6; .31; .03; .02 INJECTION, SOLUTION INTRAVENOUS at 03:25

## 2022-08-28 RX ADMIN — ACETAMINOPHEN 650 MG: 325 TABLET ORAL at 11:48

## 2022-08-28 RX ADMIN — SODIUM CHLORIDE 5 MILLION UNITS: 0.9 INJECTION, SOLUTION INTRAVENOUS at 02:27

## 2022-08-28 RX ADMIN — SODIUM CHLORIDE, SODIUM LACTATE, POTASSIUM CHLORIDE, AND CALCIUM CHLORIDE 125 ML/HR: .6; .31; .03; .02 INJECTION, SOLUTION INTRAVENOUS at 07:30

## 2022-08-28 RX ADMIN — SODIUM CHLORIDE, SODIUM LACTATE, POTASSIUM CHLORIDE, AND CALCIUM CHLORIDE 125 ML/HR: .6; .31; .03; .02 INJECTION, SOLUTION INTRAVENOUS at 04:47

## 2022-08-28 RX ADMIN — Medication 1 APPLICATION: at 13:43

## 2022-08-28 RX ADMIN — SODIUM CHLORIDE 3 G: 9 INJECTION, SOLUTION INTRAVENOUS at 11:55

## 2022-08-28 RX ADMIN — BUPIVACAINE HYDROCHLORIDE 1.2 ML: 2.5 INJECTION, SOLUTION EPIDURAL; INFILTRATION; INTRACAUDAL at 03:09

## 2022-08-28 RX ADMIN — FAMOTIDINE 20 MG: 10 INJECTION, SOLUTION INTRAVENOUS at 02:55

## 2022-08-28 RX ADMIN — DOCUSATE SODIUM 100 MG: 100 CAPSULE, LIQUID FILLED ORAL at 18:12

## 2022-08-28 NOTE — L&D DELIVERY NOTE
OBGYN Vaginal Delivery Summary  Charla Cheung 31 y o  female MRN: [id number]  Unit/Bed#: -01 Encounter: [id number]    Predelivery Diagnosis:  1  Pregnancy at 40 weeks  2  Intrauterine Intramniotic Infection  3  Gestational Hypertension    Postdelivery Diagnosis:  1  Same as above  2  Delivery of term     Procedure: spontaneous vaginal delivery, repair of laceration(s)    Attending: Dr Rosy Lilly    Assistant: Dr Lynn Lujan    Anesthesia: Epidural    QBL: 79mL  Admission H 1g/dL  Admission platelets: 442 thousands/uL    Complications: none apparent    Specimens: cord blood, arterial and venous cord blood gases, placenta to pathology    Findings:   1  Viable female at 66 135 36 14, with APGARS of 8 and 9 at 1 and 5 minutes respectively  Weight pending at time of dictation  2  Spontaneous delivery of intact placenta at 1242  Central insertion 3 vessel umbilical cord  3  1st degree laceration repaired with 3-0 vicryl rapide  4  Blood gases:  Umbilical Cord Venous Blood Gas:  Results from last 7 days   Lab Units 22  1243   PH COV  7 372   PCO2 COV mm HG 35 4   HCO3 COV mmol/L 20 1   BASE EXC COV mmol/L -4 4*   O2 CT CD VB mL/dL 9 9   O2 HGB, VENOUS CORD % 64 7     Umbilical Cord Arterial Blood Gas:  Results from last 7 days   Lab Units 22  1243   PH COA  7 394   PCO2 COA  31 2   PO2 COA mm HG 29 7*   HCO3 COA mmol/L 18 6   BASE EXC COA mmol/L -5 1*   O2 CONTENT CORD ART ml/dl 14 2   O2 HGB, ARTERIAL CORD % 72 7       Disposition:  Patient tolerated the procedure well and was recovering in labor and delivery room  Brief history and labor course:  Charla Cheung is a 31 y o  Jia Halter at 43wk4d  She presented to labor and delivery in labor  She was started on PCN for GBS prophylaxis and an epidural was placed for analgesia  She SROM'd  She met criteria for gestational hypertension during her labor   She also had elevated temperatures and met criteria for Intrauterine Intramniotic Infection and was started on unasyn  She progressed to complete cervical dilation and began pushing  Description of procedure  After pushing for 29 minutes, the patient delivered a viable female  at 66 135 36 14 on 22, weight pending at time of dictation, apgars of 8 (1 min) and 9 (5 min)  The fetal vertex delivered spontaneously  Baby restituted  to ADILENE  There was 1 loose nuchal cord around the neck which was reduced  The anterior (left) shoulder delivered atraumatically with maternal expulsive forces and the assistance of gentle downward traction  The posterior shoulder delivered with maternal expulsive forces and the assistance of gentle upward traction  The remainder of the fetus delivered spontaneously  Upon delivery the infant was placed on the mother's abdomen and delayed cord clamping was performed  The umbilical cord was then doubly clamped and cut  The infant was noted to cry spontaneously and was moving all extremities appropriately  There was no evidence for injury  Awaiting nurse resuscitators evaluated the   Arterial and venous cord blood gases and cord blood were collected for analysis and were promptly sent to the lab  In the immediate post-partum, IV pitocin was administered, and the uterus was noted to contract down well with massage and pitocin  The placenta delivered spontaneously at 1242 and was noted to be intact and had a centrally inserted 3 vessel cord  The placenta was sent to pathology  The vagina, cervix, perineum, and rectum were inspected  1st degree laceration(s) were noted  Repair was completed with 3-0 vicryl rapide  At the conclusion of the procedure, all needle, sponge, and instrument counts were noted to be correct  Patient tolerated the procedure well and was allowed to recover in labor and delivery room with family and  before being transferred to the post-partum floor       Dr Charles Reyes  was present and participated in all key portions of the ariella Reeves MD  8/28/2022  1:07 PM

## 2022-08-28 NOTE — TELEPHONE ENCOUNTER
Regarding: labor  ----- Message from [facility] sent at 8/27/2022 11:10 PM EDT -----  "I believe I am in labor"

## 2022-08-28 NOTE — OB LABOR/OXYTOCIN SAFETY PROGRESS
Labor Progress Note - Kieran Borges 31 y o  female MRN: [id number]    Unit/Bed#: -01 Encounter: [id number]       Contraction Frequency (minutes): 3-4  Contraction Quality: Moderate  Tachysystole: No   Cervical Dilation: 7        Cervical Effacement: 100  Fetal Station: -2  Baseline Rate: 145 bpm  Fetal Heart Rate: 150 BPM  FHR Category: Category II               Vital Signs:   Vitals:    08/28/22 1013   BP: 108/57   Pulse: 100   Resp:    Temp:    SpO2:        Notes/comments:   Patient still comfortable, feeling some pressure but not much  SVE above  Tracing category 1 overall  [place]: 3-4    Discussed with Dr Tr Obregon MD 8/28/2022 10:24 AM

## 2022-08-28 NOTE — ASSESSMENT & PLAN NOTE
Lochia WNL   Recovering well   Appropriate bowel and bladder function   Pain well controlled   Tolerating diet   Ambulating without issues   No lower extremity tenderness  GBS Positive   Rh positive

## 2022-08-28 NOTE — H&P
[address] [facility] 31 y o  female MRN: [id number]  Unit/Bed#: -01 Encounter: [id number]    Assessment: 31 y o   at 40w1d admitted for labor   SVE:   FHT: 830T  Cephalic confirmed by TAUS  GBS status: Positive        Plan:   GBS carrier  Assessment & Plan  Will begin penicillin for GBS ppx     * 40 weeks gestation of pregnancy  Assessment & Plan  SVE on arrival   Clear liquid diet   RPR/Type and Screen CBC  Analgesia at maternal request   Expectant management         Discussed case and plan w/ Dr Michaela Watson      Chief Complaint: contractions    HPI: Benny Peres is a 31 y o  Brit Garcia with an MARIBEL of 2022, by Ultrasound at 40w1d who is being admitted for labor   She complains of uterine contractions, occurring every 3-5 minutes, has no LOF, and reports no VB  She states she has felt good FM  Patient Active Problem List   Diagnosis    Asthma    Hx of preeclampsia, prior pregnancy, currently pregnant    Multigravida in third trimester    40 weeks gestation of pregnancy    Obesity affecting pregnancy, antepartum    Short interval between pregnancies complicating pregnancy in third trimester, antepartum    GBS carrier       Baby complications/comments: none    Review of Systems   Constitutional: Negative for activity change  HENT: Negative  Eyes: Negative  Respiratory: Negative  Cardiovascular: Negative  Gastrointestinal: Positive for abdominal pain  Endocrine: Negative  Genitourinary: Positive for vaginal discharge  Negative for vaginal bleeding  Musculoskeletal: Negative  Skin: Negative  Allergic/Immunologic: Negative  Neurological: Negative  Hematological: Negative  Psychiatric/Behavioral: Negative          OB Hx:  OB History    Para Term  AB Living   2 1 1 0 0 1   SAB IAB Ectopic Multiple Live Births   0 0 0 0 1      # Outcome Date GA Lbr Matt/2nd Weight Sex Delivery Anes PTL Lv   2 Current            1 Term 06/26/21 37w6d / 01:19 2296 g (5 lb 1 oz) F Vag-Spont EPI  JANET       Past Medical Hx:  Past Medical History:   Diagnosis Date    Asthma     Seasonal allergies     Strabismus     corrected in 2005       Past Surgical hx:  Past Surgical History:   Procedure Laterality Date    EYE SURGERY  2005    strabismus correction     WISDOM TOOTH EXTRACTION  2008       Allergies   Allergen Reactions    Rony Flavor [Flavoring Agent - Food Allergy] Throat Swelling       Medications Prior to Admission   Medication    calcium carbonate (TUMS) 500 mg chewable tablet    Prenatal MV-Min-Fe Fum-FA-DHA (PRENATAL+DHA PO)       Objective:  Temp:  [97 8 °F (36 6 °C)-98 °F (36 7 °C)] 98 °F (36 7 °C)  HR:  [] 96  Resp:  [20] 20  BP: ()/(50-86) 115/55  Body mass index is 36 47 kg/m²  Physical Exam:  Physical Exam  Constitutional:       Appearance: Normal appearance  Comments: Uncomfortable with contractions    HENT:      Head: Normocephalic and atraumatic  Cardiovascular:      Rate and Rhythm: Normal rate  Pulmonary:      Effort: Pulmonary effort is normal    Abdominal:      Tenderness: There is no abdominal tenderness  Comments: Gravid   Neurological:      General: No focal deficit present  Mental Status: She is alert  Skin:     General: Skin is warm and dry     Psychiatric:         Mood and Affect: Mood normal             FHT:  Baseline Rate: 140 bpm  Variability: Moderate 6-25 bpm  Accelerations: 15 x 15 or greater  Decelerations: None    TOCO:   Contraction Frequency (minutes): 2-3  Contraction Duration (seconds): 70-90  Contraction Quality: Moderate    Lab Results   Component Value Date    WBC 12 69 (H) 08/28/2022    HGB 14 1 08/28/2022    HCT 42 5 08/28/2022     08/28/2022     Lab Results   Component Value Date    K 4 1 02/15/2022     02/15/2022    CO2 24 02/15/2022    BUN 7 02/15/2022    CREATININE 0 54 (L) 02/15/2022    AST 10 02/15/2022    ALT 18 02/15/2022     Prenatal Labs: Reviewed      Blood type: A Positive   Antibody: Negative   GBS: Positive   HIV: Non-Reactive   Rubella: Immune  VDRL/RPR: Non reactive  HBsAg: Negative  Chlamydia: Negative  Gonorrhea: Negative  Diabetes 1 hour screen: 97  Platelets: 383  Hgb: 14 2  >2 Midnights  INPATIENT     Signature/Title: Bharti Pittman MD  Date: 8/28/2022  Time: 7:15 AM

## 2022-08-28 NOTE — OB LABOR/OXYTOCIN SAFETY PROGRESS
Labor Progress Note - Jose Gallegos 31 y o  female MRN: [id number]    Unit/Bed#: -01 Encounter: [id number]       Contraction Frequency (minutes): 1 5-3 (difficult trace at times)  Contraction Quality: Moderate  Tachysystole: No   Cervical Dilation: 10  Dilation Complete Date: 08/28/22  Dilation Complete Time: 1153  Cervical Effacement: 100  Fetal Station: 1  Baseline Rate: 160 bpm  Fetal Heart Rate: 170 BPM  FHR Category: Category I               Vital Signs:   Vitals:    08/28/22 1128   BP: 139/67   Pulse: (!) 118   Resp:    Temp:    SpO2:        Notes/comments:    Patient has met criteria for III  Unasyn ordered  Feeling a lot of pressure and is now complete  Will start pushing    D/w Dr Kelsea Atwood MD 8/28/2022 11:54 AM

## 2022-08-28 NOTE — ANESTHESIA PROCEDURE NOTES
CSE Block    Patient location during procedure: OB  Start time: 8/28/2022 3:09 AM  Reason for block: procedure for pain and at surgeon's request  Staffing  Performed: Anesthesiologist   Anesthesiologist: Allie Pierson MD  Preanesthetic Checklist  Completed: patient identified, IV checked, site marked, risks and benefits discussed, surgical consent, monitors and equipment checked, pre-op evaluation and timeout performed  CSE  Patient position: sitting  Prep: ChloraPrep  Patient monitoring: cardiac monitor and continuous pulse ox  Approach: midline  Spinal Needle  Needle type: pencil-tip   Needle gauge: 27 G  Needle length: 10 cm  Epidural Needle  Injection technique: TRISH saline  Needle type: Tuohy   Needle gauge: 18 G  Needle insertion depth: 5 cm  Location: lumbar (1-5)  Catheter  Catheter type: side hole  Catheter size: 20 G  Catheter at skin depth: 10 cm  Catheter securement method: stabilization device  Test dose: negative  Assessment  Injection Assessment:  negative aspiration for heme, no paresthesia on injection, positive aspiration for clear CSF and no pain on injection

## 2022-08-28 NOTE — ANESTHESIA POSTPROCEDURE EVALUATION
Post-Op Assessment Note    CV Status:  Stable  Pain Score: 0    Pain management: adequate     Mental Status:  Alert and awake   Hydration Status:  Euvolemic   PONV Controlled:  Controlled   Airway Patency:  Patent      Post Op Vitals Reviewed: Yes      Staff: CRNA   Comments: gatito maex4 no deficits    Post-op block assessment: no complications, catheter intact and site cleaned      No complications documented      BP      Temp      Pulse     Resp      SpO2

## 2022-08-28 NOTE — PLAN OF CARE
Problem: PAIN - ADULT  Goal: Verbalizes/displays adequate comfort level or baseline comfort level  Description: Interventions:  - Encourage patient to monitor pain and request assistance  - Assess pain using appropriate pain scale  - Administer analgesics based on type and severity of pain and evaluate response  - Implement non-pharmacological measures as appropriate and evaluate response  - Consider cultural and social influences on pain and pain management  - Notify physician/advanced practitioner if interventions unsuccessful or patient reports new pain  Outcome: Progressing     Problem: INFECTION - ADULT  Goal: Absence or prevention of progression during hospitalization  Description: INTERVENTIONS:  - Assess and monitor for signs and symptoms of infection  - Monitor lab/diagnostic results  - Monitor all insertion sites, i e  indwelling lines, tubes, and drains  - Monitor endotracheal if appropriate and nasal secretions for changes in amount and color  - [place] appropriate cooling/warming therapies per order  - Administer medications as ordered  - Instruct and encourage patient and family to use good hand hygiene technique  - Identify and instruct in appropriate isolation precautions for identified infection/condition  Outcome: Progressing  Goal: Absence of fever/infection during neutropenic period  Description: INTERVENTIONS:  - Monitor WBC    Outcome: Progressing     Problem: SAFETY ADULT  Goal: Patient will remain free of falls  Description: INTERVENTIONS:  - Educate patient/family on patient safety including physical limitations  - Instruct patient to call for assistance with activity   - Consult OT/PT to assist with strengthening/mobility   - Keep Call bell within reach  - Keep bed low and locked with side rails adjusted as appropriate  - Keep care items and personal belongings within reach  - Initiate and maintain comfort rounds  - Make Fall Risk Sign visible to staff  - Offer Toileting every  Hours, in advance of need  - Initiate/Maintain alarm  - Obtain necessary fall risk management equipment:   - Apply yellow socks and bracelet for high fall risk patients  - Consider moving patient to room near nurses station  Outcome: Progressing  Goal: Maintain or return to baseline ADL function  Description: INTERVENTIONS:  -  Assess patient's ability to carry out ADLs; assess patient's baseline for ADL function and identify physical deficits which impact ability to perform ADLs (bathing, care of mouth/teeth, toileting, grooming, dressing, etc )  - Assess/evaluate cause of self-care deficits   - Assess range of motion  - Assess patient's mobility; develop plan if impaired  - Assess patient's need for assistive devices and provide as appropriate  - Encourage maximum independence but intervene and supervise when necessary  - Involve family in performance of ADLs  - Assess for home care needs following discharge   - Consider OT consult to assist with ADL evaluation and planning for discharge  - Provide patient education as appropriate  Outcome: Progressing  Goal: Maintains/Returns to pre admission functional level  Description: INTERVENTIONS:  - Perform BMAT or MOVE assessment daily    - Set and communicate daily mobility goal to care team and patient/family/caregiver  - Collaborate with rehabilitation services on mobility goals if consulted  - Perform Range of Motion  times a day  - Reposition patient every  hours    - Dangle patient  times a day  - Stand patient  times a day  - Ambulate patient  times a day  - Out of bed to chair  times a day   - Out of bed for meals  times a day  - Out of bed for toileting  - Record patient progress and toleration of activity level   Outcome: Progressing

## 2022-08-28 NOTE — ANESTHESIA PREPROCEDURE EVALUATION
Procedure:  LABOR ANALGESIA    Relevant Problems   ANESTHESIA (within normal limits)      CARDIO (within normal limits)      ENDO (within normal limits)      GI/HEPATIC (within normal limits)      /RENAL (within normal limits)      GYN   (+) 37 weeks gestation of pregnancy   (+) Multigravida in third trimester   (+) Short interval between pregnancies complicating pregnancy in third trimester, antepartum      HEMATOLOGY (within normal limits)      MUSCULOSKELETAL (within normal limits)      NEURO/PSYCH   (+) Hx of preeclampsia, prior pregnancy, currently pregnant      PULMONARY   (+) Asthma        Physical Exam    Airway    Mallampati score: II  TM Distance: >3 FB  Neck ROM: full     Dental   No notable dental hx     Cardiovascular  Rhythm: regular, Rate: normal, Cardiovascular exam normal    Pulmonary  Pulmonary exam normal Breath sounds clear to auscultation,     Other Findings        Anesthesia Plan  ASA Score- 2     Anesthesia Type- epidural with ASA Monitors  Additional Monitors:   Airway Plan:           Plan Factors-    Chart reviewed  Existing labs reviewed  Patient summary reviewed  Induction-     Postoperative Plan-     Informed Consent- Anesthetic plan and risks discussed with patient  I personally reviewed this patient with the CRNA  Discussed and agreed on the Anesthesia Plan with the CRNA  Juan Willams Recent labs personally reviewed:  Lab Results   Component Value Date    WBC 12 69 (H) 08/28/2022    HGB 14 1 08/28/2022     08/28/2022     Lab Results   Component Value Date    K 4 1 02/15/2022    BUN 7 02/15/2022    CREATININE 0 54 (L) 02/15/2022     No results found for: PTT   No results found for: INR    Blood type A    No results found for: Carol Hanna, Lexie Cid MD, have personally seen and evaluated the patient prior to anesthetic care  I have reviewed the pre-anesthetic record, and other medical records if appropriate to the anesthetic care    If a CRNA is involved in the case, I have reviewed the CRNA assessment, if present, and agree  Risks/benefits and alternatives discussed with patient including possible PONV, sore throat, and possibility of rare anesthetic and surgical emergencies

## 2022-08-28 NOTE — PLAN OF CARE
Problem: PAIN - ADULT  Goal: Verbalizes/displays adequate comfort level or baseline comfort level  Description: Interventions:  - Encourage patient to monitor pain and request assistance  - Assess pain using appropriate pain scale  - Administer analgesics based on type and severity of pain and evaluate response  - Implement non-pharmacological measures as appropriate and evaluate response  - Consider cultural and social influences on pain and pain management  - Notify physician/advanced practitioner if interventions unsuccessful or patient reports new pain  Outcome: Progressing     Problem: INFECTION - ADULT  Goal: Absence or prevention of progression during hospitalization  Description: INTERVENTIONS:  - Assess and monitor for signs and symptoms of infection  - Monitor lab/diagnostic results  - Monitor all insertion sites, i e  indwelling lines, tubes, and drains  - Monitor endotracheal if appropriate and nasal secretions for changes in amount and color  - [place] appropriate cooling/warming therapies per order  - Administer medications as ordered  - Instruct and encourage patient and family to use good hand hygiene technique  - Identify and instruct in appropriate isolation precautions for identified infection/condition  Outcome: Progressing  Goal: Absence of fever/infection during neutropenic period  Description: INTERVENTIONS:  - Monitor WBC    Outcome: Progressing     Problem: SAFETY ADULT  Goal: Patient will remain free of falls  Description: INTERVENTIONS:  - Educate patient/family on patient safety including physical limitations  - Instruct patient to call for assistance with activity   - Consult OT/PT to assist with strengthening/mobility   - Keep Call bell within reach  - Keep bed low and locked with side rails adjusted as appropriate  - Keep care items and personal belongings within reach  - Initiate and maintain comfort rounds  - Make Fall Risk Sign visible to staff  - Apply yellow socks and bracelet for high fall risk patients  - Consider moving patient to room near nurses station  Outcome: Progressing  Goal: Maintain or return to baseline ADL function  Description: INTERVENTIONS:  -  Assess patient's ability to carry out ADLs; assess patient's baseline for ADL function and identify physical deficits which impact ability to perform ADLs (bathing, care of mouth/teeth, toileting, grooming, dressing, etc )  - Assess/evaluate cause of self-care deficits   - Assess range of motion  - Assess patient's mobility; develop plan if impaired  - Assess patient's need for assistive devices and provide as appropriate  - Encourage maximum independence but intervene and supervise when necessary  - Involve family in performance of ADLs  - Assess for home care needs following discharge   - Consider OT consult to assist with ADL evaluation and planning for discharge  - Provide patient education as appropriate  Outcome: Progressing  Goal: Maintains/Returns to pre admission functional level  Description: INTERVENTIONS:  - Perform BMAT or MOVE assessment daily    - Set and communicate daily mobility goal to care team and patient/family/caregiver     - Collaborate with rehabilitation services on mobility goals if consulted  - Out of bed for toileting  - Record patient progress and toleration of activity level   Outcome: Progressing     Problem: DISCHARGE PLANNING  Goal: Discharge to home or other facility with appropriate resources  Description: INTERVENTIONS:  - Identify barriers to discharge w/patient and caregiver  - Arrange for needed discharge resources and transportation as appropriate  - Identify discharge learning needs (meds, wound care, etc )  - Arrange for interpretive services to assist at discharge as needed  - Refer to Case Management Department for coordinating discharge planning if the patient needs post-hospital services based on physician/advanced practitioner order or complex needs related to functional status, cognitive ability, or social support system  Outcome: Progressing     Problem: POSTPARTUM  Goal: Experiences normal postpartum course  Description: INTERVENTIONS:  - Monitor maternal vital signs  - Assess uterine involution and lochia  Outcome: Progressing  Goal: Appropriate maternal -  bonding  Description: INTERVENTIONS:  - Identify family support  - Assess for appropriate maternal/infant bonding   -Encourage maternal/infant bonding opportunities  - Referral to  or  as needed  Outcome: Progressing  Goal: Establishment of infant feeding pattern  Description: INTERVENTIONS:  - Assess breast/bottle feeding  - Refer to lactation as needed  Outcome: Progressing  Goal: Incision(s), wounds(s) or drain site(s) healing without S/S of infection  Description: INTERVENTIONS  - Assess and document dressing, incision, wound bed, drain sites and surrounding tissue  - Provide patient and family education  Outcome: Progressing

## 2022-08-28 NOTE — DISCHARGE SUMMARY
Obstetrics Discharge Summary  Cezar Nam 31 y o  female MRN: [id number]  Unit/Bed#: -01 Encounter: [id number]    Admission Date: 2022     Discharge Date: 22    Admitting Attending: Dr Kwan Monteiro  Delivery Attending: Dr Soha Krishnamurthy  Discharging Attending: Dr Kwan Monteiro    Admitting Diagnoses:   Pregnancy at 40 weeks    Discharge Diagnoses:   Gestational Hypertension  Intrauterine Intramniotic Infection  Delivered      Procedures: spontaneous vaginal delivery    Anesthesia: epidural    Yoana Pederson is a 31 y o  Letta Dubin at 40wk1d  She presented to labor and delivery in labor  She was started on PCN for GBS prophylaxis and an epidural was placed for analgesia  She SROM'd  She met criteria for gestational hypertension during her labor  She also had elevated temperatures and met criteria for Intrauterine Intramniotic Infection and was started on unasyn  She progressed to complete cervical dilation and began pushing  On 22 she delivered a viable female  at 40w1d via normal spontaneous vaginal delivery  She sustained 1st degree lacerations during delivery  which were adequately repaired  's birth weight was 6lbs 11 9oz; Apgars were 8 (1 min) and 9 (5 min)   was admitted to the  nursery  Patient tolerated the procedure well  Her post-delivery course was uncomplicated  Her postpartum pain was well controlled with oral analgesics  Maternal blood type is A pos so RhoGAM was not indicated  On day of discharge, she was ambulating and able to reasonably perform all ADLs  She was voiding and had appropriate bowel function  Pain was well controlled  She was discharged home on postpartum day #2 without complications  Patient was instructed to follow up with her OBGYN as an outpatient and was given appropriate warnings to call provider if she develops signs of infection or uncontrolled pain      Complications: none apparent    Condition at discharge: good     Provisions for Follow-Up Care:  Please see after visit summary for information related to follow-up care and any pertinent home health orders  Disposition: Home    Planned Readmission: No    Discharge Medications:   Please see AVS for a complete list of discharge medications  Discharge instructions :   Please see AVS for complete discharge instructions      Coco Gardner MD  PGY-1

## 2022-08-28 NOTE — TELEPHONE ENCOUNTER
Reason for Disposition   [1] History of prior delivery (multipara) AND [2] contractions < 10 minutes apart AND [3] present 1 hour    Answer Assessment - Initial Assessment Questions  1  ONSET: "When did the symptoms begin?"         0700, regular since 2100  2  CONTRACTIONS: "Describe the contractions that you are having " (e g , duration, frequency, regularity, severity)     Q5-6 min  3  MARIBEL: "What date are you expecting to deliver?"     8/27  4  PARITY: "Have you had a baby before?" If Yes, ask: "How long did the labor last?"      2nd   5  FETAL MOVEMENT: "Has the baby's movement decreased or changed significantly from normal?"  Baseline   6   OTHER SYMPTOMS: "Do you have any other symptoms?" (e g , leaking fluid from vagina, vaginal bleeding, fever, hand/facial swelling)     Headache, mucus plug    Protocols used: PREGNANCY - LABOR-ADULT-

## 2022-08-28 NOTE — TELEPHONE ENCOUNTER
40W0D, MARIBEL: 8/27/22, , MARIBEL: 9/17  Second pregnancy  Contraction onset 0700, currently Q5-6 min since 2100  Baseline FM  Mucus plug noted, and new mild headache  No additional symptoms  En route to [facility]  On call provider contacted and informed of patients concerns and status  [facility] L&D charge nurse notified

## 2022-08-28 NOTE — OB LABOR/OXYTOCIN SAFETY PROGRESS
Labor Progress Note - Michael Mott 31 y o  female MRN: [id number]    Unit/Bed#: -01 Encounter: [id number]       Contraction Frequency (minutes): 2-3  Contraction Quality: Moderate  Tachysystole: No   Cervical Dilation: 5-6        Cervical Effacement: 80  Fetal Station: -2  Baseline Rate: 125 bpm  Fetal Heart Rate: 129 BPM                  Vital Signs:   Vitals:    08/28/22 0330   BP:    Pulse:    Resp:    Temp: 97 8 °F (36 6 °C)   SpO2:        Notes/comments:   Due for check  SVE at this time 5-6/80/-2  Comfortable with epidural  Continue expectant management     Sarthak Romero MD 8/28/2022 4:10 AM

## 2022-08-28 NOTE — OB LABOR/OXYTOCIN SAFETY PROGRESS
Labor Progress Note - Gi Vargas 31 y o  female MRN: [id number]    Unit/Bed#: -01 Encounter: [id number]       Contraction Frequency (minutes): 2-3  Contraction Quality: Moderate  Tachysystole: No   Cervical Dilation: 6        Cervical Effacement: 90  Fetal Station: -2  Baseline Rate: 140 bpm  Fetal Heart Rate: 149 BPM                  Vital Signs:  Vitals:    08/28/22 0615   BP: 107/66   Pulse: 101   Resp:    Temp:    SpO2:        Notes/comments:    Patient comfortable with epidural  Having some late decelerations  SVE as above     [place]: 2-3    Kulwinder Vaughan MD 8/28/2022 6:34 AM

## 2022-08-29 PROBLEM — O13.9 GESTATIONAL HYPERTENSION: Status: ACTIVE | Noted: 2022-08-29

## 2022-08-29 PROBLEM — O41.1290 CHORIOAMNIONITIS: Status: ACTIVE | Noted: 2022-08-29

## 2022-08-29 LAB — RPR SER QL: NORMAL

## 2022-08-29 PROCEDURE — 99024 POSTOP FOLLOW-UP VISIT: CPT | Performed by: OBSTETRICS & GYNECOLOGY

## 2022-08-29 RX ADMIN — AMPICILLIN SODIUM AND SULBACTAM SODIUM 3 G: 100; 50 INJECTION, POWDER, FOR SOLUTION INTRAMUSCULAR; INTRAVENOUS at 12:11

## 2022-08-29 RX ADMIN — DOCUSATE SODIUM 100 MG: 100 CAPSULE, LIQUID FILLED ORAL at 18:04

## 2022-08-29 RX ADMIN — SODIUM CHLORIDE, SODIUM LACTATE, POTASSIUM CHLORIDE, AND CALCIUM CHLORIDE 50 ML/HR: .6; .31; .03; .02 INJECTION, SOLUTION INTRAVENOUS at 05:57

## 2022-08-29 RX ADMIN — DOCUSATE SODIUM 100 MG: 100 CAPSULE, LIQUID FILLED ORAL at 08:51

## 2022-08-29 RX ADMIN — AMPICILLIN SODIUM AND SULBACTAM SODIUM 3 G: 100; 50 INJECTION, POWDER, FOR SOLUTION INTRAMUSCULAR; INTRAVENOUS at 05:57

## 2022-08-29 NOTE — LACTATION NOTE
This note was copied from a baby's chart  CONSULT - LACTATION  Baby Girl Lexie Larson) Samy Gallegos 0 days female MRN: [id number]    [facility] Room / Bed: (N)/(N) Encounter: [id number]    Maternal Information     MOTHER:  Lynda Vogt  Maternal Age: 31 y o    OB History: # 1 - Date: 21, Sex: Female, Weight: 2296 g (5 lb 1 oz), GA: 37w6d, Delivery: Vaginal, Spontaneous, Apgar1: 9, Apgar5: 9, Living: Living, Birth Comments: None    # 2 - Date: 22, Sex: Female, Weight: 3060 g (6 lb 11 9 oz), GA: 40w1d, Delivery: Vaginal, Spontaneous, Apgar1: 8, Apgar5: 9, Living: Living, Birth Comments: None   Previouse breast reduction surgery? No    Lactation history:   Has patient previously breast fed: Yes   How long had patient previously breast fed: Pumped for 7 months  Previous breast feeding complications: Other (Comment) (latch Issues)     Past Surgical History:   Procedure Laterality Date    EYE SURGERY      strabismus correction     WISDOM TOOTH EXTRACTION          Birth information:  YOB: 2023   Time of birth: 12:37 PM   Sex: female   Delivery type: Vaginal, Spontaneous   Birth Weight: 3060 g (6 lb 11 9 oz)   Percent of Weight Change: 0%     Gestational Age: 44w3d   [unfilled]    Assessment     Breast and nipple assessment: normal assessment    [place] Assessment: normal assessment    Feeding assessment: not assessed  LATCH:  Latch: Audible Swallowing:    Type of Nipple:    Comfort (Breast/Nipple):    Hold (Positioning):    LATCH Score:         Feeding recommendations:  breast feed on demand     Met with [facility] who states that her baby is latching well so far  Provided her with the Ready Set Baby Booklet which contained information on:  Hand expression with access to QR codes to review hand expression  Positioning and latch as well as showing images of other feeding positions    Discussed the properties of a good latch in any position  Feeding on cue and what that means for recognizing infant's hunger  Skin to Skin contact and benefits to mom and baby  Avoidance of pacifiers for the first month   Gave information on common concerns, what to expect the first few weeks after delivery, preparing for other caregivers, and how partners can help  Resources for support also provided  Encouraged her to call for lactation support as needed  Phone number provided  Discharge Breastfeeding Booklet left at bedside for review            Erica Sánchez RN 8/28/2022 11:00 PM

## 2022-08-29 NOTE — PLAN OF CARE
Problem: PAIN - ADULT  Goal: Verbalizes/displays adequate comfort level or baseline comfort level  Description: Interventions:  - Encourage patient to monitor pain and request assistance  - Assess pain using appropriate pain scale  - Administer analgesics based on type and severity of pain and evaluate response  - Implement non-pharmacological measures as appropriate and evaluate response  - Consider cultural and social influences on pain and pain management  - Notify physician/advanced practitioner if interventions unsuccessful or patient reports new pain  Outcome: Progressing     Problem: INFECTION - ADULT  Goal: Absence or prevention of progression during hospitalization  Description: INTERVENTIONS:  - Assess and monitor for signs and symptoms of infection  - Monitor lab/diagnostic results  - Monitor all insertion sites, i e  indwelling lines, tubes, and drains  - Monitor endotracheal if appropriate and nasal secretions for changes in amount and color  - [place] appropriate cooling/warming therapies per order  - Administer medications as ordered  - Instruct and encourage patient and family to use good hand hygiene technique  - Identify and instruct in appropriate isolation precautions for identified infection/condition  Outcome: Progressing  Goal: Absence of fever/infection during neutropenic period  Description: INTERVENTIONS:  - Monitor WBC    Outcome: Progressing     Problem: SAFETY ADULT  Goal: Patient will remain free of falls  Description: INTERVENTIONS:  - Educate patient/family on patient safety including physical limitations  - Instruct patient to call for assistance with activity   - Consult OT/PT to assist with strengthening/mobility   - Keep Call bell within reach  - Keep bed low and locked with side rails adjusted as appropriate  - Keep care items and personal belongings within reach  - Initiate and maintain comfort rounds  - Make Fall Risk Sign visible to staff  - Offer Toileting every  Hours, in advance of need  - Initiate/Maintain alarm  - Obtain necessary fall risk management equipment:   - Apply yellow socks and bracelet for high fall risk patients  - Consider moving patient to room near nurses station  Outcome: Progressing  Goal: Maintain or return to baseline ADL function  Description: INTERVENTIONS:  -  Assess patient's ability to carry out ADLs; assess patient's baseline for ADL function and identify physical deficits which impact ability to perform ADLs (bathing, care of mouth/teeth, toileting, grooming, dressing, etc )  - Assess/evaluate cause of self-care deficits   - Assess range of motion  - Assess patient's mobility; develop plan if impaired  - Assess patient's need for assistive devices and provide as appropriate  - Encourage maximum independence but intervene and supervise when necessary  - Involve family in performance of ADLs  - Assess for home care needs following discharge   - Consider OT consult to assist with ADL evaluation and planning for discharge  - Provide patient education as appropriate  Outcome: Progressing  Goal: Maintains/Returns to pre admission functional level  Description: INTERVENTIONS:  - Perform BMAT or MOVE assessment daily    - Set and communicate daily mobility goal to care team and patient/family/caregiver  - Collaborate with rehabilitation services on mobility goals if consulted  - Perform Range of Motion  times a day  - Reposition patient every  hours    - Dangle patient  times a day  - Stand patient  times a day  - Ambulate patient  times a day  - Out of bed to chair  times a day   - Out of bed for meals  times a day  - Out of bed for toileting  - Record patient progress and toleration of activity level   Outcome: Progressing     Problem: DISCHARGE PLANNING  Goal: Discharge to home or other facility with appropriate resources  Description: INTERVENTIONS:  - Identify barriers to discharge w/patient and caregiver  - Arrange for needed discharge resources and transportation as appropriate  - Identify discharge learning needs (meds, wound care, etc )  - Arrange for interpretive services to assist at discharge as needed  - Refer to Case Management Department for coordinating discharge planning if the patient needs post-hospital services based on physician/advanced practitioner order or complex needs related to functional status, cognitive ability, or social support system  Outcome: Progressing     Problem: POSTPARTUM  Goal: Experiences normal postpartum course  Description: INTERVENTIONS:  - Monitor maternal vital signs  - Assess uterine involution and lochia  Outcome: Progressing  Goal: Appropriate maternal -  bonding  Description: INTERVENTIONS:  - Identify family support  - Assess for appropriate maternal/infant bonding   -Encourage maternal/infant bonding opportunities  - Referral to  or  as needed  Outcome: Progressing  Goal: Establishment of infant feeding pattern  Description: INTERVENTIONS:  - Assess breast/bottle feeding  - Refer to lactation as needed  Outcome: Progressing  Goal: Incision(s), wounds(s) or drain site(s) healing without S/S of infection  Description: INTERVENTIONS  - Assess and document dressing, incision, wound bed, drain sites and surrounding tissue  - Provide patient and family education  - Perform skin care/dressing changes every  Outcome: Progressing

## 2022-08-29 NOTE — PROGRESS NOTES
Progress Note - OB/GYN  Kiran Patches 31 y o  female MRN: [id number]  Unit/Bed#: -01 Encounter: [id number]    Assessment and Plan     Kiran Arzola is a patient of: [facility]  She is PPD# 1 s/p  spontaneous vaginal delivery  Recovering well and is stable       Gestational hypertension  Assessment & Plan  gHTN here (no P/C), CMP/CBC wnl  Pressures 110-120/50-60    Chorioamnionitis  Assessment & Plan  III:8/28 Tmax [email or web address], Tlast [email or web address] Unasyn for 24h   Last temp 98 3      * 40 weeks gestation of pregnancy  Assessment & Plan  Lochia WNL   Recovering well   Appropriate bowel and bladder function   Pain well controlled   Tolerating diet   Ambulating without issues   No lower extremity tenderness  GBS Positive   Rh positive           Disposition    - Anticipate discharge home on PPD# 2      Subjective/Objective     Chief Complaint: Postpartum State     Subjective:    Kiran Arzola is PPD/POD#1 s/p  spontaneous vaginal delivery  She has no current complaints  Pain is well controlled  Patient is currently voiding  She is ambulating  Patient is currently passing flatus and has had no bowel movement  She is tolerating PO, and denies nausea or vomitting  Patient denies fever, chills, chest pain, shortness of breath, or calf tenderness  Lochia is normal  She is recovering well and is stable         Vitals:   /55   Pulse 81   Temp 98 3 °F (36 8 °C) (Oral)   Resp 18   Ht 5' 1" (1 549 m)   Wt 87 5 kg (193 lb)   LMP 11/04/2021 (Exact Date)   SpO2 97%   Breastfeeding Yes   BMI 36 47 kg/m²       Intake/Output Summary (Last 24 hours) at 8/29/2022 0650  Last data filed at 8/28/2022 1701  Gross per 24 hour   Intake 2968 75 ml   Output 2779 ml   Net 189 75 ml       Invasive Devices  Timeline    Peripheral Intravenous Line  Duration           Peripheral IV 08/28/22 Left;Ventral (anterior) Forearm 1 day                Physical Exam:   GEN: Kiran Arzola appears well, alert and oriented x 3, pleasant and cooperative   CARDIO: RRR, no murmurs or rubs  RESP:  CTAB, no wheezes or rales  ABDOMEN: soft, no tenderness, no distention, fundus @ U-2  EXTREMITIES: SCDs on, non tender, no erythema, b/l Bita's sign negative      Labs:     Hemoglobin   Date Value Ref Range Status   08/28/2022 14 1 11 5 - 15 4 g/dL Final   06/15/2022 12 8 11 5 - 15 4 g/dL Final     WBC   Date Value Ref Range Status   08/28/2022 12 69 (H) 4 31 - 10 16 Thousand/uL Final   06/15/2022 9 81 4 31 - 10 16 Thousand/uL Final     Platelets   Date Value Ref Range Status   08/28/2022 249 149 - 390 Thousands/uL Final   06/15/2022 273 149 - 390 Thousands/uL Final     Creatinine   Date Value Ref Range Status   08/28/2022 0 78 0 60 - 1 30 mg/dL Final     Comment:     Standardized to IDMS reference method   02/15/2022 0 54 (L) 0 60 - 1 30 mg/dL Final     Comment:     Standardized to IDMS reference method     AST   Date Value Ref Range Status   08/28/2022 20 13 - 39 U/L Final     Comment:     Specimen collection should occur prior to Sulfasalazine administration due to the potential for falsely depressed results  02/15/2022 10 5 - 45 U/L Final     Comment:     Specimen collection should occur prior to Sulfasalazine administration due to the potential for falsely depressed results  ALT   Date Value Ref Range Status   08/28/2022 12 7 - 52 U/L Final     Comment:     Specimen collection should occur prior to Sulfasalazine administration due to the potential for falsely depressed results  02/15/2022 18 12 - 78 U/L Final     Comment:     Specimen collection should occur prior to Sulfasalazine and/or Sulfapyridine administration due to the potential for falsely depressed results             Coco Gardner MD  8/29/2022  6:50 AM

## 2022-08-29 NOTE — PLAN OF CARE
Problem: PAIN - ADULT  Goal: Verbalizes/displays adequate comfort level or baseline comfort level  Description: Interventions:  - Encourage patient to monitor pain and request assistance  - Assess pain using appropriate pain scale  - Administer analgesics based on type and severity of pain and evaluate response  - Implement non-pharmacological measures as appropriate and evaluate response  - Consider cultural and social influences on pain and pain management  - Notify physician/advanced practitioner if interventions unsuccessful or patient reports new pain  Outcome: Progressing     Problem: INFECTION - ADULT  Goal: Absence or prevention of progression during hospitalization  Description: INTERVENTIONS:  - Assess and monitor for signs and symptoms of infection  - Monitor lab/diagnostic results  - Monitor all insertion sites, i e  indwelling lines, tubes, and drains  - Monitor endotracheal if appropriate and nasal secretions for changes in amount and color  - [place] appropriate cooling/warming therapies per order  - Administer medications as ordered  - Instruct and encourage patient and family to use good hand hygiene technique  - Identify and instruct in appropriate isolation precautions for identified infection/condition  Outcome: Progressing  Goal: Absence of fever/infection during neutropenic period  Description: INTERVENTIONS:  - Monitor WBC    Outcome: Progressing     Problem: SAFETY ADULT  Goal: Patient will remain free of falls  Description: INTERVENTIONS:  - Educate patient/family on patient safety including physical limitations  - Instruct patient to call for assistance with activity   - Consult OT/PT to assist with strengthening/mobility   - Keep Call bell within reach  - Keep bed low and locked with side rails adjusted as appropriate  - Keep care items and personal belongings within reach  - Initiate and maintain comfort rounds  - Make Fall Risk Sign visible to staff  - Offer Toileting every  Hours, in advance of need  - Initiate/Maintain alarm  - Obtain necessary fall risk management equipment:   - Apply yellow socks and bracelet for high fall risk patients  - Consider moving patient to room near nurses station  Outcome: Progressing  Goal: Maintain or return to baseline ADL function  Description: INTERVENTIONS:  -  Assess patient's ability to carry out ADLs; assess patient's baseline for ADL function and identify physical deficits which impact ability to perform ADLs (bathing, care of mouth/teeth, toileting, grooming, dressing, etc )  - Assess/evaluate cause of self-care deficits   - Assess range of motion  - Assess patient's mobility; develop plan if impaired  - Assess patient's need for assistive devices and provide as appropriate  - Encourage maximum independence but intervene and supervise when necessary  - Involve family in performance of ADLs  - Assess for home care needs following discharge   - Consider OT consult to assist with ADL evaluation and planning for discharge  - Provide patient education as appropriate  Outcome: Progressing  Goal: Maintains/Returns to pre admission functional level  Description: INTERVENTIONS:  - Perform BMAT or MOVE assessment daily    - Set and communicate daily mobility goal to care team and patient/family/caregiver  - Collaborate with rehabilitation services on mobility goals if consulted  - Perform Range of Motion  times a day  - Reposition patient every  hours    - Dangle patient  times a day  - Stand patient  times a day  - Ambulate patient  times a day  - Out of bed to chair  times a day   - Out of bed for meals  times a day  - Out of bed for toileting  - Record patient progress and toleration of activity level   Outcome: Progressing     Problem: DISCHARGE PLANNING  Goal: Discharge to home or other facility with appropriate resources  Description: INTERVENTIONS:  - Identify barriers to discharge w/patient and caregiver  - Arrange for needed discharge resources and transportation as appropriate  - Identify discharge learning needs (meds, wound care, etc )  - Arrange for interpretive services to assist at discharge as needed  - Refer to Case Management Department for coordinating discharge planning if the patient needs post-hospital services based on physician/advanced practitioner order or complex needs related to functional status, cognitive ability, or social support system  Outcome: Progressing     Problem: POSTPARTUM  Goal: Experiences normal postpartum course  Description: INTERVENTIONS:  - Monitor maternal vital signs  - Assess uterine involution and lochia  Outcome: Progressing  Goal: Appropriate maternal -  bonding  Description: INTERVENTIONS:  - Identify family support  - Assess for appropriate maternal/infant bonding   -Encourage maternal/infant bonding opportunities  - Referral to  or  as needed  Outcome: Progressing  Goal: Establishment of infant feeding pattern  Description: INTERVENTIONS:  - Assess breast/bottle feeding  - Refer to lactation as needed  Outcome: Progressing  Goal: Incision(s), wounds(s) or drain site(s) healing without S/S of infection  Description: INTERVENTIONS  - Assess and document dressing, incision, wound bed, drain sites and surrounding tissue  - Provide patient and family education  - Perform skin care/dressing changes every   Outcome: Progressing

## 2022-08-30 VITALS
BODY MASS INDEX: 36.44 KG/M2 | OXYGEN SATURATION: 97 % | HEIGHT: 61 IN | RESPIRATION RATE: 20 BRPM | DIASTOLIC BLOOD PRESSURE: 55 MMHG | HEART RATE: 71 BPM | WEIGHT: 193 LBS | SYSTOLIC BLOOD PRESSURE: 123 MMHG | TEMPERATURE: 98.5 F

## 2022-08-30 PROCEDURE — 99024 POSTOP FOLLOW-UP VISIT: CPT | Performed by: STUDENT IN AN ORGANIZED HEALTH CARE EDUCATION/TRAINING PROGRAM

## 2022-08-30 RX ORDER — DOCUSATE SODIUM 100 MG/1
100 CAPSULE, LIQUID FILLED ORAL 2 TIMES DAILY
Refills: 0
Start: 2022-08-30 | End: 2022-10-25

## 2022-08-30 RX ORDER — IBUPROFEN 600 MG/1
600 TABLET ORAL EVERY 6 HOURS PRN
Qty: 30 TABLET | Refills: 0
Start: 2022-08-30 | End: 2022-10-25

## 2022-08-30 RX ADMIN — DOCUSATE SODIUM 100 MG: 100 CAPSULE, LIQUID FILLED ORAL at 08:04

## 2022-08-30 NOTE — PROGRESS NOTES
Progress Note - OB/GYN  Ladonna Meza 31 y o  female MRN: [id number]  Unit/Bed#: -01 Encounter: [id number]    Assessment and Plan     Ladonna Meza is a patient of: [facility]  She is PPD# 2 s/p  spontaneous vaginal delivery  Recovering well and is stable  Discharge home today  Gestational hypertension  Assessment & Plan  gHTN here (no P/C), CMP/CBC wnl  Pressures 110-120/50-60    Chorioamnionitis  Assessment & Plan  III: Tmax [email or web address], Tlast [email or web address] Unasyn for 24h   Last temp 97 9      * 40 weeks gestation of pregnancy  Assessment & Plan  Lochia WNL   Recovering well   Appropriate bowel and bladder function   Pain well controlled   Tolerating diet   Ambulating without issues   No lower extremity tenderness  GBS Positive   Rh positive           Disposition    - Anticipate discharge home on PPD# 2      Subjective/Objective     Chief Complaint: Postpartum State     Subjective:    Ladonna Meza is PPD/POD#2 s/p  spontaneous vaginal delivery  She has no current complaints  Pain is well controlled  Patient is currently voiding  She is ambulating  Patient is currently passing flatus and has had no bowel movement  She is tolerating PO, and denies nausea or vomitting  Patient denies fever, chills, chest pain, shortness of breath, or calf tenderness  Lochia is normal  She is recovering well and is stable         Vitals:   /71 (BP Location: Right arm)   Pulse 63   Temp 97 9 °F (36 6 °C) (Oral)   Resp 18   Ht 5' 1" (1 549 m)   Wt 87 5 kg (193 lb)   LMP 2021 (Exact Date)   SpO2 97%   Breastfeeding Yes   BMI 36 47 kg/m²     No intake or output data in the 24 hours ending 22 0643    Invasive Devices  Timeline    None                 Physical Exam:   GEN: Ladonna Meza appears well, alert and oriented x 3, pleasant and cooperative   CARDIO: RRR, no murmurs or rubs  RESP:  CTAB, no wheezes or rales  ABDOMEN: soft, no tenderness, no distention, fundus @ U-2  EXTREMITIES: SCDs on, non tender, no erythema, b/l Bita's sign negative      Labs:     Hemoglobin   Date Value Ref Range Status   08/28/2022 14 1 11 5 - 15 4 g/dL Final   06/15/2022 12 8 11 5 - 15 4 g/dL Final     WBC   Date Value Ref Range Status   08/28/2022 12 69 (H) 4 31 - 10 16 Thousand/uL Final   06/15/2022 9 81 4 31 - 10 16 Thousand/uL Final     Platelets   Date Value Ref Range Status   08/28/2022 249 149 - 390 Thousands/uL Final   06/15/2022 273 149 - 390 Thousands/uL Final     Creatinine   Date Value Ref Range Status   08/28/2022 0 78 0 60 - 1 30 mg/dL Final     Comment:     Standardized to IDMS reference method   02/15/2022 0 54 (L) 0 60 - 1 30 mg/dL Final     Comment:     Standardized to IDMS reference method     AST   Date Value Ref Range Status   08/28/2022 20 13 - 39 U/L Final     Comment:     Specimen collection should occur prior to Sulfasalazine administration due to the potential for falsely depressed results  02/15/2022 10 5 - 45 U/L Final     Comment:     Specimen collection should occur prior to Sulfasalazine administration due to the potential for falsely depressed results  ALT   Date Value Ref Range Status   08/28/2022 12 7 - 52 U/L Final     Comment:     Specimen collection should occur prior to Sulfasalazine administration due to the potential for falsely depressed results  02/15/2022 18 12 - 78 U/L Final     Comment:     Specimen collection should occur prior to Sulfasalazine and/or Sulfapyridine administration due to the potential for falsely depressed results             Christy Echols MD  8/30/2022  6:43 AM

## 2022-08-30 NOTE — PLAN OF CARE
Problem: PAIN - ADULT  Goal: Verbalizes/displays adequate comfort level or baseline comfort level  Description: Interventions:  - Encourage patient to monitor pain and request assistance  - Assess pain using appropriate pain scale  - Administer analgesics based on type and severity of pain and evaluate response  - Implement non-pharmacological measures as appropriate and evaluate response  - Consider cultural and social influences on pain and pain management  - Notify physician/advanced practitioner if interventions unsuccessful or patient reports new pain  Outcome: Progressing     Problem: INFECTION - ADULT  Goal: Absence or prevention of progression during hospitalization  Description: INTERVENTIONS:  - Assess and monitor for signs and symptoms of infection  - Monitor lab/diagnostic results  - Monitor all insertion sites, i e  indwelling lines, tubes, and drains  - Monitor endotracheal if appropriate and nasal secretions for changes in amount and color  - [place] appropriate cooling/warming therapies per order  - Administer medications as ordered  - Instruct and encourage patient and family to use good hand hygiene technique  - Identify and instruct in appropriate isolation precautions for identified infection/condition  Outcome: Progressing  Goal: Absence of fever/infection during neutropenic period  Description: INTERVENTIONS:  - Monitor WBC    Outcome: Progressing     Problem: SAFETY ADULT  Goal: Patient will remain free of falls  Description: INTERVENTIONS:  - Educate patient/family on patient safety including physical limitations  - Instruct patient to call for assistance with activity   - Consult OT/PT to assist with strengthening/mobility   - Keep Call bell within reach  - Keep bed low and locked with side rails adjusted as appropriate  - Keep care items and personal belongings within reach  - Initiate and maintain comfort rounds  - Make Fall Risk Sign visible to staff  - Offer Toileting every  Hours, in advance of need  - Initiate/Maintain alarm  - Obtain necessary fall risk management equipment:   - Apply yellow socks and bracelet for high fall risk patients  - Consider moving patient to room near nurses station  Outcome: Progressing  Goal: Maintain or return to baseline ADL function  Description: INTERVENTIONS:  -  Assess patient's ability to carry out ADLs; assess patient's baseline for ADL function and identify physical deficits which impact ability to perform ADLs (bathing, care of mouth/teeth, toileting, grooming, dressing, etc )  - Assess/evaluate cause of self-care deficits   - Assess range of motion  - Assess patient's mobility; develop plan if impaired  - Assess patient's need for assistive devices and provide as appropriate  - Encourage maximum independence but intervene and supervise when necessary  - Involve family in performance of ADLs  - Assess for home care needs following discharge   - Consider OT consult to assist with ADL evaluation and planning for discharge  - Provide patient education as appropriate  Outcome: Progressing  Goal: Maintains/Returns to pre admission functional level  Description: INTERVENTIONS:  - Perform BMAT or MOVE assessment daily    - Set and communicate daily mobility goal to care team and patient/family/caregiver  - Collaborate with rehabilitation services on mobility goals if consulted  - Perform Range of Motion  times a day  - Reposition patient every  hours    - Dangle patient  times a day  - Stand patient  times a day  - Ambulate patient  times a day  - Out of bed to chair  times a day   - Out of bed for meals  times a day  - Out of bed for toileting  - Record patient progress and toleration of activity level   Outcome: Progressing     Problem: DISCHARGE PLANNING  Goal: Discharge to home or other facility with appropriate resources  Description: INTERVENTIONS:  - Identify barriers to discharge w/patient and caregiver  - Arrange for needed discharge resources and transportation as appropriate  - Identify discharge learning needs (meds, wound care, etc )  - Arrange for interpretive services to assist at discharge as needed  - Refer to Case Management Department for coordinating discharge planning if the patient needs post-hospital services based on physician/advanced practitioner order or complex needs related to functional status, cognitive ability, or social support system  Outcome: Progressing     Problem: POSTPARTUM  Goal: Experiences normal postpartum course  Description: INTERVENTIONS:  - Monitor maternal vital signs  - Assess uterine involution and lochia  Outcome: Progressing  Goal: Appropriate maternal -  bonding  Description: INTERVENTIONS:  - Identify family support  - Assess for appropriate maternal/infant bonding   -Encourage maternal/infant bonding opportunities  - Referral to  or  as needed  Outcome: Progressing  Goal: Establishment of infant feeding pattern  Description: INTERVENTIONS:  - Assess breast/bottle feeding  - Refer to lactation as needed  Outcome: Progressing  Goal: Incision(s), wounds(s) or drain site(s) healing without S/S of infection  Description: INTERVENTIONS  - Assess and document dressing, incision, wound bed, drain sites and surrounding tissue  - Provide patient and family education  - Perform skin care/dressing changes every  Outcome: Progressing

## 2022-09-01 PROCEDURE — 88307 TISSUE EXAM BY PATHOLOGIST: CPT | Performed by: SPECIALIST

## 2022-09-27 ENCOUNTER — POSTPARTUM VISIT (OUTPATIENT)
Dept: OBGYN CLINIC | Facility: CLINIC | Age: 27
End: 2022-09-27

## 2022-09-27 VITALS — DIASTOLIC BLOOD PRESSURE: 78 MMHG | SYSTOLIC BLOOD PRESSURE: 122 MMHG | WEIGHT: 175 LBS | BODY MASS INDEX: 33.07 KG/M2

## 2022-09-27 PROCEDURE — 99024 POSTOP FOLLOW-UP VISIT: CPT | Performed by: OBSTETRICS & GYNECOLOGY

## 2022-09-27 NOTE — PROGRESS NOTES
OB POSTPARTUM VISIT PROGRESS NOTE  Date of Encounter: 2022    Joe Hernandez    : 1995  (31 y o )  MR: [id number]    Nicola Gerard is in for her postpartum visit  She is now [id number]  She delivered by normal spontaneous vaginal delivery  She's generally doing well, denies current pain or bleeding issues, and has no significant depression issues  She is breast feeding exclusively  We discussed all appropriate contraceptive options and she chooses the Mirena IUD  Objective   EXAM:    VITALS: Blood pressure 122/78, weight 79 4 kg (175 lb), last menstrual period 2021, currently breastfeeding  BMI: Body mass index is 33 07 kg/m²  Physical Exam  Constitutional:       Appearance: Normal appearance  HENT:      Head: Normocephalic  Cardiovascular:      Rate and Rhythm: Normal rate and regular rhythm  Musculoskeletal:         General: No swelling  Neurological:      General: No focal deficit present  Mental Status: She is alert and oriented to person, place, and time  Skin:     General: Skin is warm and dry  Psychiatric:         Mood and Affect: Mood normal          Behavior: Behavior normal    Vitals reviewed       PDS 3      Assessment/Plan   Diagnoses and all orders for this visit:    Postpartum care following vaginal delivery    RTO 3-5 weeks for Mirena insertion    Jackalyn Meigs, MD

## 2022-10-25 ENCOUNTER — PROCEDURE VISIT (OUTPATIENT)
Dept: OBGYN CLINIC | Facility: CLINIC | Age: 27
End: 2022-10-25

## 2022-10-25 VITALS — WEIGHT: 177.2 LBS | BODY MASS INDEX: 33.48 KG/M2 | DIASTOLIC BLOOD PRESSURE: 82 MMHG | SYSTOLIC BLOOD PRESSURE: 122 MMHG

## 2022-10-25 DIAGNOSIS — Z30.430 ENCOUNTER FOR INSERTION OF INTRAUTERINE CONTRACEPTIVE DEVICE (IUD): Primary | ICD-10-CM

## 2022-10-25 DIAGNOSIS — Z32.02 PREGNANCY TEST NEGATIVE: ICD-10-CM

## 2022-10-25 LAB — SL AMB POCT URINE HCG: NORMAL

## 2022-10-25 NOTE — PROGRESS NOTES
Iud insertions    Date/Time: 10/25/2022 3:28 PM  Performed by: Yvon Denise MD  Authorized by: Yvon Denise MD     Other Assisting Provider: No    Verbal consent obtained?: Yes    Written consent obtained?: Yes    Risks and benefits: Risks, benefits and alternatives were discussed    Consent given by:  Patient  Time Out:     Time out: Immediately prior to the procedure a time out was called    Patient states understanding of procedure being performed: Yes    Patient's understanding of procedure matches consent: Yes    Required items: Required blood products, implants, devices and special equipment available    Patient identity confirmed:  Verbally with patient  Procedure:     Pelvic exam performed: yes      Negative urine pregnancy test: yes      Cervix cleaned and prepped: yes      Speculum placed in vagina: yes      Tenaculum applied to cervix: yes      Uterus sounded: yes      Uterus sound depth (cm):  9 5    IUD inserted with no complications: yes      IUD type:  Mirena    Strings trimmed: yes    Post-procedure:     Patient tolerated procedure well: yes      Patient will follow up after next period: yes

## 2023-01-01 NOTE — ANESTHESIA POSTPROCEDURE EVALUATION
Post-Op Assessment Note    CV Status:  Stable  Pain Score: 0    Pain management: adequate     Mental Status:  Alert and awake   Hydration Status:  Euvolemic   PONV Controlled:  Controlled   Airway Patency:  Patent      Post Op Vitals Reviewed: Yes      Staff: CRNA     Post-op block assessment: catheter intact and no complications      No complications documented      BP      Temp      Pulse     Resp      SpO2 EOAE (evoked otoacoustic emission)

## 2023-01-31 NOTE — DISCHARGE INSTRUCTIONS
Vaginal Delivery   WHAT YOU SHOULD KNOW:   A vaginal delivery is the birth of your baby through your vagina (birth canal)  AFTER YOU LEAVE:   Medicines:  · NSAIDs  help decrease swelling and pain or fever  This medicine is available with or without a doctor's order  NSAIDs can cause stomach bleeding or kidney problems in certain people  If you take blood thinner medicine, always ask your healthcare provider if NSAIDs are safe for you  Always read the medicine label and follow directions  · Take your medicine as directed  Call your healthcare provider if you think your medicine is not helping or if you have side effects  Tell him if you are allergic to any medicine  Keep a list of the medicines, vitamins, and herbs you take  Include the amounts, and when and why you take them  Bring the list or the pill bottles to follow-up visits  Carry your medicine list with you in case of an emergency  Follow up with your primary healthcare provider:  Most women need to return 6 weeks after a vaginal delivery  Ask about how to care for your wounds or stitches  Write down your questions so you remember to ask them during your visits  Activity:  Rest as much as possible  Try to keep all activities short  You may be able to do some exercise soon after you have your baby  Talk with your primary healthcare provider before you start exercising  If you work outside the home, ask when you can return to your job  Kegel exercises:  Kegel exercises may help your vaginal and rectal muscles heal faster  You can do Kegel exercises by tightening and relaxing the muscles around your vagina  Kegel exercises help make the muscles stronger  Breast care:  When your milk comes in, your breasts may feel full and hard  Ask how to care for your breasts, even if you are not breastfeeding  Constipation:  Do not try to push the bowel movement out if it is too hard   High-fiber foods, extra liquids, and regular exercise can help you prevent constipation  Examples of high-fiber foods are fruit and bran  Prune juice and water are good liquids to drink  Regular exercise helps your digestive system work  You may also be told to take over-the-counter fiber and stool softener medicines  Take these items as directed  Hemorrhoids:  Pregnancy can cause severe hemorrhoids  You may have rectal pain because of the hemorrhoids  Ask how to prevent or treat hemorrhoids  Perineum care: Your perineum is the area between your vagina and anus  Keep the area clean and dry to help it heal and to prevent infection  Wash the area gently with soap and water when you bathe or shower  Rinse your perineum with warm water when you use the toilet  Your primary healthcare provider may suggest you use a warm sitz bath to help decrease pain  A sitz bath is a bathtub or basin filled to hip level  Stay in the sitz bath for 20 to 30 minutes, or as directed  Vaginal discharge: You will have vaginal discharge, called lochia, after your delivery  The lochia is bright red the first day or two after the birth  By the fourth day, the amount decreases, and it turns red-brown  Use a sanitary pad rather than a tampon to prevent a vaginal infection  It is normal to have lochia up to 8 weeks after your baby is born  Monthly periods: Your period may start again within 7 to 12 weeks after your baby is born  If you are breastfeeding, it may take longer for your period to start again  You can still get pregnant again even though you do not have your monthly period  Talk with your primary healthcare provider about a birth control method that will be good for you if you do not want to get pregnant  Mood changes: Many new mothers have some kind of mood changes after delivery  Some of these changes occur because of lack of sleep, hormone changes, and caring for a new baby  Some mood changes can be more serious, such as postpartum depression   Talk with your primary healthcare provider if you feel unable to care for yourself or your baby  Sexual activity:  You may need to avoid sex for 6 to 7 weeks after you have your baby  You may notice you have a decreased desire for sex, or sex may be painful  You may need to use a vaginal lubricant (gel) to help make sex more comfortable  Contact your primary healthcare provider if:   · You have heavy vaginal bleeding that fills 1 or more sanitary pads in 1 hour  · You have a fever  · Your pain does not go away, or gets worse  · The skin between your vagina and rectum is swollen, warm, or red  · You have swollen, hard, or painful breasts  · You feel very sad or depressed  · You feel more tired than usual      · You have questions or concerns about your condition or care  Seek care immediately or call 911 if:   · You have pus or yellow drainage coming from your vagina or wound  · You are urinating very little, or not at all  · Your arm or leg feels warm, tender, and painful  It may look swollen and red  · You feel lightheaded, have sudden and worsening chest pain, or trouble breathing  You may have more pain when you take deep breaths or cough, or you may cough up blood  © 2014 [address] is for End User's use only and may not be sold, redistributed or otherwise used for commercial purposes  All illustrations and images included in CareNotes® are the copyrighted property of [employer] , [employer]  or Juan Foster  The above information is an  only  It is not intended as medical advice for individual conditions or treatments  Talk to your doctor, nurse or pharmacist before following any medical regimen to see if it is safe and effective for you  General: Appearance is consistent with chronological age. No abnormal facies.  EENT: Anicteric sclera; oropharynx clear, moist mucus membranes  Cardiovascular:  Rate and rhythm evaluated  Respiratory: Unlabored breathing  Neurological: Awake and alert, moves all extremities  Constitutional: MET>4 Risks, benefits and alternatives including but not limited to nausea, bleeding, and local trauma/positioning related injury discussed. All questions answered. Mom present. The patient agrees to proceed.    Scopalamine patch added

## 2023-11-28 ENCOUNTER — ANNUAL EXAM (OUTPATIENT)
Age: 28
End: 2023-11-28
Payer: COMMERCIAL

## 2023-11-28 VITALS — HEIGHT: 63 IN | WEIGHT: 178.4 LBS | BODY MASS INDEX: 31.61 KG/M2

## 2023-11-28 DIAGNOSIS — Z01.419 ENCOUNTER FOR ANNUAL ROUTINE GYNECOLOGICAL EXAMINATION: Primary | ICD-10-CM

## 2023-11-28 DIAGNOSIS — Z30.432 ENCOUNTER FOR IUD REMOVAL: ICD-10-CM

## 2023-11-28 PROBLEM — O99.210 OBESITY AFFECTING PREGNANCY, ANTEPARTUM: Status: RESOLVED | Noted: 2022-04-10 | Resolved: 2023-11-28

## 2023-11-28 PROBLEM — Z22.330 GBS CARRIER: Status: RESOLVED | Noted: 2022-08-28 | Resolved: 2023-11-28

## 2023-11-28 PROBLEM — Z34.83 MULTIGRAVIDA IN THIRD TRIMESTER: Status: RESOLVED | Noted: 2022-02-15 | Resolved: 2023-11-28

## 2023-11-28 PROBLEM — O41.1290 CHORIOAMNIONITIS: Status: RESOLVED | Noted: 2022-08-29 | Resolved: 2023-11-28

## 2023-11-28 PROBLEM — Z3A.40 40 WEEKS GESTATION OF PREGNANCY: Status: RESOLVED | Noted: 2022-04-10 | Resolved: 2023-11-28

## 2023-11-28 PROBLEM — O09.893 SHORT INTERVAL BETWEEN PREGNANCIES COMPLICATING PREGNANCY IN THIRD TRIMESTER, ANTEPARTUM: Status: RESOLVED | Noted: 2022-07-15 | Resolved: 2023-11-28

## 2023-11-28 PROBLEM — O09.299 HX OF PREECLAMPSIA, PRIOR PREGNANCY, CURRENTLY PREGNANT: Status: RESOLVED | Noted: 2021-06-25 | Resolved: 2023-11-28

## 2023-11-28 PROBLEM — O13.9 GESTATIONAL HYPERTENSION: Status: RESOLVED | Noted: 2022-08-29 | Resolved: 2023-11-28

## 2023-11-28 PROCEDURE — G0476 HPV COMBO ASSAY CA SCREEN: HCPCS | Performed by: OBSTETRICS & GYNECOLOGY

## 2023-11-28 PROCEDURE — 58301 REMOVE INTRAUTERINE DEVICE: CPT | Performed by: OBSTETRICS & GYNECOLOGY

## 2023-11-28 PROCEDURE — 99395 PREV VISIT EST AGE 18-39: CPT | Performed by: OBSTETRICS & GYNECOLOGY

## 2023-11-28 PROCEDURE — G0145 SCR C/V CYTO,THINLAYER,RESCR: HCPCS | Performed by: OBSTETRICS & GYNECOLOGY

## 2023-11-28 NOTE — PROGRESS NOTES
Assessment/Plan:    1. Encounter for annual routine gynecological examination    - Liquid-based pap, screening    2. Encounter for IUD removal        Subjective      Bakari Connolly is a 33 y.o. female who presents for annual exam.  She is still nursing BID. Requests IUD removal in anticipation of conception. No breast, urinary or sexual concerns. Current contraception: IUD  History of abnormal Pap smear: no  Regular self breast exam: yes  History of abnormal mammogram: no  History of abnormal lipids: no    Menstrual History:  OB History          2    Para   2    Term   2       0    AB   0    Living   2         SAB   0    IAB   0    Ectopic   0    Multiple   0    Live Births   2                No LMP recorded. (Menstrual status: Birth Control).      Past Medical History:   Diagnosis Date   • Asthma    • Gestational hypertension    • Seasonal allergies    • Strabismus     corrected in        Family History   Problem Relation Age of Onset   • Lupus Mother    • Migraines Mother    • Thyroid disease Mother    • Thyroid cancer Mother    • Migraines Maternal Grandmother    • Lupus Maternal Grandmother    • Diabetes Maternal Grandfather    • Heart disease Maternal Grandfather    • Arthritis Paternal Grandmother    • Diabetes Paternal Grandmother    • Hypertension Paternal Grandmother    • Obesity Paternal Grandmother    • Stroke Paternal Grandmother    • Skin cancer Paternal Grandmother    • Arthritis Paternal Grandfather    • Skin cancer Paternal Grandfather    • Arthritis Paternal Uncle    • Breast cancer Other         PGGM   • Migraines Maternal Aunt    • No Known Problems Father    • Congenital heart disease Sister         needed a pacemaker at 1yrs old   • No Known Problems Brother    • Raynaud syndrome Sister    • No Known Problems Daughter        The following portions of the patient's history were reviewed and updated as appropriate: allergies, current medications, past family history, past medical history, past social history, past surgical history, and problem list.    Review of Systems  Pertinent items are noted in HPI. Objective      Ht 5' 2.5" (1.588 m)   Wt 80.9 kg (178 lb 6.4 oz)   Breastfeeding Yes   BMI 32.11 kg/m²     General:   alert and oriented, in no acute distress   Heart:  Breasts: regular rate and rhythm  appear normal, no suspicious masses, no skin or nipple changes or axillary nodes. Lungs: Effort normal   Abdomen: soft, non-tender, without masses or organomegaly   Vulva: normal   Vagina: normal mucosa   Cervix: no lesions   Uterus: normal size, mobile, non-tender   Adnexa: normal adnexa and no mass, fullness, tenderness           Iud removal    Date/Time: 11/28/2023 10:20 AM    Performed by: Roya Espinoza MD  Authorized by: Roya Espinoza MD  Universal Protocol:  Consent: Verbal consent obtained. Risks and benefits: risks, benefits and alternatives were discussed  Consent given by: patient  Time out: Immediately prior to procedure a "time out" was called to verify the correct patient, procedure, equipment, support staff and site/side marked as required.   Patient understanding: patient states understanding of the procedure being performed  Required items: required blood products, implants, devices, and special equipment available  Patient identity confirmed: verbally with patient    Procedure:     Removed with no complications: yes

## 2023-11-30 LAB
HPV HR 12 DNA CVX QL NAA+PROBE: NEGATIVE
HPV16 DNA CVX QL NAA+PROBE: NEGATIVE
HPV18 DNA CVX QL NAA+PROBE: NEGATIVE

## 2023-12-06 LAB
LAB AP GYN PRIMARY INTERPRETATION: NORMAL
Lab: NORMAL

## 2024-10-17 NOTE — TELEPHONE ENCOUNTER
Patient delivered 06/26/2021 at 37 weeks  and called to schedule appointment for blood pressure check patient would not come to [place] for an appointment and patient scheduled for 07/15/2021 in [facility]  Patient was offered appointment tomorrow in [place] would not take that appointment  <-- Click to add NO pertinent Past Medical History

## 2025-01-23 ENCOUNTER — OFFICE VISIT (OUTPATIENT)
Dept: URGENT CARE | Facility: CLINIC | Age: 30
End: 2025-01-23
Payer: COMMERCIAL

## 2025-01-23 VITALS
BODY MASS INDEX: 35.01 KG/M2 | OXYGEN SATURATION: 99 % | HEART RATE: 85 BPM | SYSTOLIC BLOOD PRESSURE: 114 MMHG | WEIGHT: 185.4 LBS | DIASTOLIC BLOOD PRESSURE: 72 MMHG | HEIGHT: 61 IN | RESPIRATION RATE: 18 BRPM | TEMPERATURE: 97.5 F

## 2025-01-23 DIAGNOSIS — J06.9 BACTERIAL UPPER RESPIRATORY INFECTION: Primary | ICD-10-CM

## 2025-01-23 DIAGNOSIS — B96.89 BACTERIAL UPPER RESPIRATORY INFECTION: Primary | ICD-10-CM

## 2025-01-23 PROCEDURE — G0382 LEV 3 HOSP TYPE B ED VISIT: HCPCS | Performed by: NURSE PRACTITIONER

## 2025-01-23 PROCEDURE — S9083 URGENT CARE CENTER GLOBAL: HCPCS | Performed by: NURSE PRACTITIONER

## 2025-01-23 RX ORDER — PREDNISONE 20 MG/1
20 TABLET ORAL 2 TIMES DAILY WITH MEALS
Qty: 10 TABLET | Refills: 0 | Status: SHIPPED | OUTPATIENT
Start: 2025-01-23 | End: 2025-01-28

## 2025-01-23 NOTE — PROGRESS NOTES
[facility]        NAME: Lynda Vogt is a 29 y.o. female  : 1995    MRN: [id number]  DATE: 2025  TIME: 9:52 AM    Assessment and Plan   Bacterial upper respiratory infection [J06.9, B96.89]  1. Bacterial upper respiratory infection  amoxicillin-clavulanate (AUGMENTIN) 875-125 mg per tablet    predniSONE 20 mg tablet            Patient Instructions       Take medications as prescribed  Follow up with PCP in 3-5 days.  Proceed to  ER if symptoms worsen.    If tests have been performed at [facility], our office will contact you with results if changes need to be made to the care plan discussed with you at the visit.  You can review your full results on [facility].    Chief Complaint     Chief Complaint   Patient presents with    Nasal Congestion     Started two months ago with post-nasal drip, coughing up yellowish green mucus, denies fever, and she's been taking Dayquil/Nightquil for relief          History of Present Illness       HPI  Presents to clinic with complaint of nose congestion, postnasal drip, cough, intermittent wheezing and chest congestion for about 2 months. Has been getting worse in the last several days. Used over-the-counter DayQuil and NyQuil without relief. Denies fever, shortness of breath or chest pain. Reports history of exercise-induced asthma. Infrequent flareups    Review of Systems   Review of Systems   Constitutional:  Negative for fever.   HENT:  Positive for congestion, postnasal drip, rhinorrhea and sinus pressure. Negative for ear pain and sore throat.    Eyes:  Negative for visual disturbance.   Respiratory:  Positive for cough and wheezing. Negative for chest tightness and shortness of breath.    Cardiovascular:  Negative for chest pain.   Gastrointestinal:  Negative for diarrhea and vomiting.   Neurological:  Negative for headaches.         Current Medications       Current Outpatient Medications:     amoxicillin-clavulanate (AUGMENTIN)  875-125 mg per tablet, Take 1 tablet by mouth every 12 (twelve) hours for 7 days, Disp: 14 tablet, Rfl: 0    predniSONE 20 mg tablet, Take 1 tablet (20 mg total) by mouth 2 (two) times a day with meals for 5 days, Disp: 10 tablet, Rfl: 0    Current Allergies     Allergies as of 01/23/2025 - Reviewed 01/23/2025   Allergen Reaction Noted    [place] flavor [flavoring agent - food allergy] Throat Swelling 12/14/2020            The following portions of the patient's history were reviewed and updated as appropriate: allergies, current medications, past family history, past medical history, past social history, past surgical history and problem list.     Past Medical History:   Diagnosis Date    Asthma     Gestational hypertension     Seasonal allergies     Strabismus     corrected in 2005       Past Surgical History:   Procedure Laterality Date    EYE SURGERY  2005    strabismus correction     WISDOM TOOTH EXTRACTION  2008       Family History   Problem Relation Age of Onset    Lupus Mother     Migraines Mother     Thyroid disease Mother     Thyroid cancer Mother     Migraines Maternal Grandmother     Lupus Maternal Grandmother     Diabetes Maternal Grandfather     Heart disease Maternal Grandfather     Arthritis Paternal Grandmother     Diabetes Paternal Grandmother     Hypertension Paternal Grandmother     Obesity Paternal Grandmother     Stroke Paternal Grandmother     Skin cancer Paternal Grandmother     Arthritis Paternal Grandfather     Skin cancer Paternal Grandfather     Arthritis Paternal Uncle     Breast cancer Other         PGGM    Migraines Maternal Aunt     No Known Problems Father     Congenital heart disease Sister         needed a pacemaker at 1yrs old    No Known Problems Brother     Raynaud syndrome Sister     No Known Problems Daughter          Medications have been verified.        Objective   /72 (BP Location: Right arm, Patient Position: Sitting, Cuff Size: Standard)   Pulse 85   Temp 97.5 °F  "(36.4 °C) (Tympanic)   Resp 18   Ht 5' 1\" (1.549 m)   Wt 84.1 kg (185 lb 6.4 oz)   LMP 12/19/2024 (Exact Date)   SpO2 99%   Breastfeeding No   BMI 35.03 kg/m²   Patient's last menstrual period was 12/19/2024 (exact date).       Physical Exam     Physical Exam  Constitutional:       General: She is not in acute distress.     Appearance: She is not ill-appearing or diaphoretic.   HENT:      Head:      Comments: Mild TTP of the maxillary and paranasal sinuses      Right Ear: Tympanic membrane normal.      Left Ear: Tympanic membrane normal.      Nose: Rhinorrhea present.      Mouth/Throat:      Pharynx: No posterior oropharyngeal erythema.      Comments: Clear post nasal drip  Cardiovascular:      Rate and Rhythm: Regular rhythm.      Heart sounds: Normal heart sounds.   Pulmonary:      Comments: Mild congestion  Lymphadenopathy:      Cervical: No cervical adenopathy.                   "

## 2025-01-30 ENCOUNTER — NURSE TRIAGE (OUTPATIENT)
Age: 30
End: 2025-01-30

## 2025-01-30 NOTE — TELEPHONE ENCOUNTER
"6w4d per lmp of 12/15.  Called to confirm safe to use amoxicillin and prednisone in pregnancy.    She has already completed prednisone 20mg BID x 5 days and is on last dose of Amoxicillin.    Advised both medications are used in pregnancy- nothing to do at this time. Increase fluids.   D/V 2/11/2025    Will forward to provider to review and call back if any additional recommendations are provided.     Reason for Disposition   Health information question, no triage required and triager able to answer question    Answer Assessment - Initial Assessment Questions  1. REASON FOR CALL: \"What is the main reason for your call?\" or \"How can I best help you?\"      Recent sinus, uri infection  2. SYMPTOMS : \"Do you have any symptoms?\"       Denies current symptoms   3. OTHER QUESTIONS: \"Do you have any other questions?\"      Calling to confirm safe to take Amoxicillin in pregnancy and prednisone    She just finished the prednisone 20 mg BID and is on last dose of Amoxicillin.    Protocols used: Information Only Call - No Triage-Adult-[place]    "

## 2025-01-30 NOTE — TELEPHONE ENCOUNTER
Regarding: question about medication  ----- Message from Zakia WHEELER sent at 1/30/2025  9:01 AM EST -----  Patient is pregnant has D & V scheduled and has a respiratory infection and is taking Amoxicillin. She would like confirmation that this is acceptable.

## 2025-02-11 ENCOUNTER — ULTRASOUND (OUTPATIENT)
Age: 30
End: 2025-02-11
Payer: COMMERCIAL

## 2025-02-11 VITALS
DIASTOLIC BLOOD PRESSURE: 78 MMHG | BODY MASS INDEX: 35.08 KG/M2 | WEIGHT: 185.8 LBS | SYSTOLIC BLOOD PRESSURE: 122 MMHG | HEIGHT: 61 IN

## 2025-02-11 DIAGNOSIS — Z32.01 POSITIVE PREGNANCY TEST: ICD-10-CM

## 2025-02-11 DIAGNOSIS — Z13.79 GENETIC SCREENING: ICD-10-CM

## 2025-02-11 DIAGNOSIS — Z32.01 PREGNANCY TEST PERFORMED, PREGNANCY CONFIRMED: Primary | ICD-10-CM

## 2025-02-11 PROCEDURE — 99214 OFFICE O/P EST MOD 30 MIN: CPT | Performed by: OBSTETRICS & GYNECOLOGY

## 2025-02-11 PROCEDURE — 76817 TRANSVAGINAL US OBSTETRIC: CPT | Performed by: OBSTETRICS & GYNECOLOGY

## 2025-02-11 NOTE — PROGRESS NOTES
Ultrasound Probe Disinfection    A transvaginal ultrasound was performed.   Prior to use, disinfection was performed with High Level Disinfection Process (Trophon)  Probe serial number  [id number]  was used    Ryann Painter MA  02/11/25  3:34 PM

## 2025-02-12 NOTE — PROGRESS NOTES
"Assessment/Plan:    SIUP @ 6 weeks 2 days by sono    Pregnancy test performed, pregnancy confirmed  -     AMB US OB < 14 weeks single or first gestation level 1  -     Repeat sono 10-14 days to reconfirm viability and dates        Subjective      Lynda Vogt is a 29 y.o.  LMP 12/10/2024 who presents with amenorrhea and + urine hCG.  No spotting.    Ectopic risks: none.  Cycle length: regular 32-34 days.  Pregnancy testing: at home.  Pregnancy imaging: not done.  Blood type: A positive.  Other lab results: none.    Past Medical History:   Diagnosis Date    Asthma     Gestational hypertension     Seasonal allergies     Strabismus     corrected in        Family History   Problem Relation Age of Onset    Lupus Mother     Migraines Mother     Thyroid disease Mother     Thyroid cancer Mother     Migraines Maternal Grandmother     Lupus Maternal Grandmother     Diabetes Maternal Grandfather     Heart disease Maternal Grandfather     Arthritis Paternal Grandmother     Diabetes Paternal Grandmother     Hypertension Paternal Grandmother     Obesity Paternal Grandmother     Stroke Paternal Grandmother     Skin cancer Paternal Grandmother     Arthritis Paternal Grandfather     Skin cancer Paternal Grandfather     Arthritis Paternal Uncle     Breast cancer Other         PGGM    Migraines Maternal Aunt     No Known Problems Father     Congenital heart disease Sister         needed a pacemaker at 1yrs old    No Known Problems Brother     Raynaud syndrome Sister     No Known Problems Daughter        The following portions of the patient's history were reviewed and updated as appropriate: allergies, current medications, past family history, past medical history, past social history, past surgical history, and problem list.    Review of Systems  Pertinent items are noted in HPI.     Objective      /78 (BP Location: Left arm, Patient Position: Sitting, Cuff Size: Large)   Ht 5' 1\" (1.549 m)   Wt 84.3 kg (185 " lb 12.8 oz)   LMP 12/10/2024 (Exact Date)   BMI 35.11 kg/m²     General: alert and oriented, in no acute distress   Heart: regular rate and rhythm   Lungs: Effort normal   Abdomen: soft, non-tender, without masses or organomegaly   Vulva: normal                     Imaging  Limited office ultrasound: see OB Procedure Tab

## 2025-02-20 ENCOUNTER — ULTRASOUND (OUTPATIENT)
Age: 30
End: 2025-02-20
Payer: COMMERCIAL

## 2025-02-20 VITALS — WEIGHT: 185.8 LBS | BODY MASS INDEX: 35.11 KG/M2 | DIASTOLIC BLOOD PRESSURE: 72 MMHG | SYSTOLIC BLOOD PRESSURE: 122 MMHG

## 2025-02-20 DIAGNOSIS — Z13.79 GENETIC SCREENING: ICD-10-CM

## 2025-02-20 DIAGNOSIS — Z33.1 INCIDENTAL PREGNANCY: Primary | ICD-10-CM

## 2025-02-20 PROCEDURE — 99214 OFFICE O/P EST MOD 30 MIN: CPT | Performed by: OBSTETRICS & GYNECOLOGY

## 2025-02-20 PROCEDURE — 76817 TRANSVAGINAL US OBSTETRIC: CPT | Performed by: OBSTETRICS & GYNECOLOGY

## 2025-02-20 NOTE — PROGRESS NOTES
Ultrasound Probe Disinfection    A transvaginal ultrasound was performed.   Prior to use, disinfection was performed with High Level Disinfection Process (Trophon)  Probe serial number  [id number]  was used    Ryann Painter MA  02/20/25  3:45 PM

## 2025-02-24 ENCOUNTER — TELEPHONE (OUTPATIENT)
Age: 30
End: 2025-02-24

## 2025-02-24 NOTE — PROGRESS NOTES
GYN Follow Up Visit    Patient is here for repeat viability sono.  Unplanned pregnancy - size<dates last sono.  No spotting.  No n/v.    PMH, PSH, Meds, Allergies, SOcHx, FamHx reviewed and no changes noted.    ROS:  pertinent findings in HPI    Vitals:    02/20/25 1539   BP: 122/72       Physical Exam  Constitutional:       Appearance: Normal appearance.   Genitourinary:      Vulva normal.   HENT:      Head: Normocephalic.   Cardiovascular:      Rate and Rhythm: Normal rate and regular rhythm.   Pulmonary:      Effort: Pulmonary effort is normal.   Abdominal:      Palpations: Abdomen is soft.      Tenderness: There is no abdominal tenderness.   Musculoskeletal:         General: No swelling.   Neurological:      General: No focal deficit present.      Mental Status: She is alert and oriented to person, place, and time.   Skin:     General: Skin is warm and dry.   Psychiatric:         Mood and Affect: Mood normal.         Behavior: Behavior normal.   Vitals reviewed.      Sono:  under OB Procedure report    Impression/Plan:    SIUP @ 7 weeks 4 days by chantal EDC 10/5/2025  MFM referral placed  Schedule OB intake and PN-1 visits

## 2025-03-17 NOTE — PROGRESS NOTES
OB INTAKE INTERVIEW 2025    Patient is 29 y.o. who presents for OB intake at 11w3d.  She is accompanied by anyone during this encounter.  The father of her baby (Clayton Vogt) is involved in the pregnancy.      Patient's last menstrual period was 12/10/2024 (exact date).  Ultrasound: Measured 6 weeks 2 days on25   Estimated Date of Delivery: 10/5/25 changed by dating ultrasound. 6 week US by [facility]    Signs/Symptoms of Pregnancy  Current pregnancy symptoms: fatigue, frequent urination, and constipation  Headaches: no  Cramping: no  Spotting: no  PICA cravings: no    Diabetes:  Body mass index is 35.11 kg/m².  If patient has 1 or more, please order early 1 hour GTT  History of GDM: no  BMI >35 YES - pregravid BMI 35.11  History of PCOS or current metformin use: no  History of LGA/macrosomic infant (4000g/9lbs): no    If patient has 2 or more, please order early 1 hour GTT  BMI>30 YES - pregravid BMI 35.11  AMA: no  First degree relative with type 2 diabetes: no  History of chronic HTN, hyperlipidemia, elevated A1C: no  High risk race (, , ,  or ): no    Hypertension: if you answer yes to any of the following, please order baseline preeclampsia labs (cbc, comprehensive metabolic panel, urine protein creatinine ratio, uric acid)  History of of chronic HTN: no  History of gestational HTN: no  History of preeclampsia, eclampsia, or HELLP syndrome: no  History of diabetes: no  History of lupus,sjogrens syndrome, kidney disease no    Thyroid: if yes order TSH with reflex T4  History of thyroid disease: no    Bleeding Disorder or Hx of DVT - patient or first degree relative with history of. Order the following if not done previously.   (Factor V, antithrombin III, prothrombin gene mutation, protein C and S Ag, lupus anticoagulant, anticardiolipin, beta-2 glycoprotein):   no    OB/GYN:  History of abnormal pap smear: no       Date of last pap smear:  2023  History of HPV: no  History of Herpes/HSV: no  History of other STI: (gonorrhea, chlamydia, trich) no  History of prior : YES x2   History of prior : no  History of  delivery prior to 36 weeks 6 days: no  History of blood transfusion: no  Ok for blood transfusion: YES    Substance screening-   History of tobacco use no  Currently using tobacco no  Substance Use Screen Level:  No Risk    MRSA Screening:   Does the pt have a hx of MRSA? no    Mental Health:  Hx of/or current dx of depression: no  Hx of/or current dx of anxiety: no  Medications: no   EPDS Screen:  Negative / score: 2    Dental Health:  Patient has seen a dentist in the past 6 months: no    Immunizations:  Influenza vaccine given this season: YES - 2024  Discussed Tdap vaccine:  YES  Discussed COVID Vaccine:  YES - had in the past  History of Varicella or Vaccination had varicella vaccine    Genetic/MFM:  Do you or your partner have a history of any of the following in yourselves or first degree relatives?  Cystic fibrosis: no  Spinal muscular atrophy: no  Hemoglobinopathy/Sickle Cell/Thalassemia: no  Fragile X Intellectual Disability: no    Discussed Carrier Screening being completed once in a lifetime as a standard of care lab test. Place orders for Cystic Fibrosis Gene Test (YDJ285) and Spinal Muscular Atrophy DNA ([id number]).  Patient was informed that prior authorization needs to be completed for these tests and this may take 7-10 business days.  Patient does not desire testing for Cystic Fibrosis and Spinal Muscular Atrophy.    Appointment for Nuchal Translucency Ultrasound at [facility] is scheduled for 25.    Interview education:  [facility] Pregnancy Essentials Book reviewed, discussed and attached to their AVS: YES     Nurse/Family Partnership-patient may qualify NO; referral placed NO     Prenatal lab work scripts: YES    Extra labs ordered: 1 hour GTT, CMP, Protein/creatinine ratio urine, and Uric  Acid    Aspirin/Preeclampsia Screen    Risk Level Risk Factor Recommendation   LOW Prior Uncomplicated full-term delivery NO - gHTN/pre-e No Aspirin recommendation        MODERATE Nulliparity no Recommend low-dose aspirin if     BMI>30 YES - pregravid BMI 35 2 or more moderate risk factors    Family History Preeclampsia (mother/sister) YES - pts mother     35yr old or greater no     Black Race, Concern for SDOH/Low Socioeconomic no     IVF Pregnancy  no     Personal History Risks (low birth weight, prior adverse preg outcome, >10yr preg interval) no         HIGH History of Preeclampsia YES - pre-e and gHTN Recommend low-dose aspirin if     Multifetal gestation no 1 or more high risk factors    Chronic HTN no     Type 1 or 2 Diabetes no     Renal Disease no     Autoimmune Disease  no      Contraindications to ASA therapy:  NSAID/ ASA allergy: no  Nasal polyps: no  Asthma with history of ASA induced bronchospasm: no    Relative contraindications:  History of GI bleed: no  Active peptic ulcer disease: no  Severe hepatic dysfunction: no    Patient does meet recommendation to take ASA 162mg during this pregnancy from 12-36 wks to lower her risk of preeclampsia.  Instructions given and patient verbalized understanding.    The patient has a history now or in prior pregnancy notable for:  Obesity, asthma, hx pre-eclampsia w/o SF in 1st pregnancy, hx gHTN in 2nd pregnancy, family hx - sister w/electrical heart defect - had pacer at age 1, intra amniotic infection in prior pregnancy        Details that I feel the provider should be aware of: Lynda was seen here in office for her OB Intake visit, Hx obtained, Offered no c/o at at this time. Discussed need for  LDASA therapy d/t elevated pre-e risk, pt verbalized understanding. States that she has already started taking them.  Reviewed medications safe to take in pregnancy. [facility] Essentials packet/link reviewed. [facility] Baby and Me classes reviewed and how to register for  classes. Reviewed timing of prenatal lab draw, verbalized understanding.      PN1 visit scheduled. The patient was oriented to our practice, the navigator role, reviewed delivering physicians and [facility] for delivery. All questions were answered.    Interviewed by: Kenzie Moy RN

## 2025-03-17 NOTE — PATIENT INSTRUCTIONS
Congratulations on your pregnancy!  We thank you for allowing us to participate in your care.    NEXT STEPS    Go to the lab to have your prenatal bloodwork competed if you have not already done so.  There is a listing of [facility] and locations in your prenatal folder. You may also visit [email or web address] or call [phone].   Please be aware that some insurance companies may require you to go to a specific lab (ex. [employer] or [employer]). You can verify this by contacting your insurance company.   If you have decided to be screened for CF and SMA genetic testing, these tests require prior authorization and scheduling.  Prior Authorization is not a guarantee of payment. There may be out of pocket expenses that includes copays, deductibles and or coinsurance for your individual plan.  Please call [phone] if our team has not contacted you in 7 business days.  Please have your blood work completed prior to you next prenatal visit.    If you have decided to have genetic testing done at Maternal Fetal Medicine, that will be scheduled by [facility]. You may have already scheduled this appointment.  If not, please call their office to schedule this appointment.  Based on the referral placed by our office, they will know how to schedule you appropriately.    Contact information for Maternal Fetal Medicine is located in your prenatal folder. The main phone number to their office is [phone].     Return to our office for your first routine prenatal visit.     Warning Signs During Pregnancy - If you experience any problems or concerns, call the office directly.  The list below includes warning signs your providers would like you to be aware of.  If you experience any of these at any time during your pregnancy, please call us as soon as possible.    Vaginal bleeding   Sharp abdominal pain that does not go away   Fever (more than 100.4?F and is not relieved with Tylenol)   Persistent vomiting lasting greater than 24  hours   Chest pain/Shortness of breath   Pain or burning when you urinate     Call the OFFICE [phone] for any questions/emergencies.  At night or on the weekend, calls go through a triage service, please indicate it is an emergency and the DOCTOR on call will be paged.    Remember to only use MyChart for non-urgent concerns or questions.    Our doctors deliver at  in [place]. The address is provided below.     [facility]  [address] [place]  [address]     Please click on the links below to review our Pregnancy Essential Guide.    [facility] Pregnancy Essentials Guide  [facility] Women's Health ([email or web address])     [facility] - Virtual Tour ([employer])      To learn more about the Prenatal Education classes that [facility] offers, click the link below.  Prenatal Education Classes    Click on the link below to review [facility] Lab locations.  [facility] Lab Locations    [employer] resource  [employer] is a tool to connect you to community resources you may need.      Thank you,   Kenzie YEUNG, RN  OB Nurse Navigator

## 2025-03-19 ENCOUNTER — INITIAL PRENATAL (OUTPATIENT)
Age: 30
End: 2025-03-19

## 2025-03-19 VITALS
BODY MASS INDEX: 35.08 KG/M2 | WEIGHT: 185.8 LBS | HEIGHT: 61 IN | SYSTOLIC BLOOD PRESSURE: 118 MMHG | DIASTOLIC BLOOD PRESSURE: 70 MMHG

## 2025-03-19 DIAGNOSIS — O99.210 OBESITY IN PREGNANCY: ICD-10-CM

## 2025-03-19 DIAGNOSIS — Z87.59 HISTORY OF GESTATIONAL HYPERTENSION: ICD-10-CM

## 2025-03-19 DIAGNOSIS — Z34.81 MULTIGRAVIDA IN FIRST TRIMESTER: Primary | ICD-10-CM

## 2025-03-19 DIAGNOSIS — Z36.9 ENCOUNTER FOR ANTENATAL SCREENING: ICD-10-CM

## 2025-03-19 DIAGNOSIS — Z87.59 HISTORY OF PRE-ECLAMPSIA: ICD-10-CM

## 2025-03-19 PROCEDURE — OBC: Performed by: OBSTETRICS & GYNECOLOGY

## 2025-03-19 RX ORDER — ASPIRIN 81 MG/1
162 TABLET, CHEWABLE ORAL DAILY
COMMUNITY

## 2025-03-31 ENCOUNTER — APPOINTMENT (OUTPATIENT)
Dept: LAB | Facility: CLINIC | Age: 30
End: 2025-03-31
Payer: COMMERCIAL

## 2025-03-31 ENCOUNTER — RESULTS FOLLOW-UP (OUTPATIENT)
Dept: LABOR AND DELIVERY | Facility: HOSPITAL | Age: 30
End: 2025-03-31

## 2025-03-31 DIAGNOSIS — Z34.81 MULTIGRAVIDA IN FIRST TRIMESTER: ICD-10-CM

## 2025-03-31 DIAGNOSIS — O99.210 OBESITY IN PREGNANCY: ICD-10-CM

## 2025-03-31 DIAGNOSIS — Z87.59 HISTORY OF GESTATIONAL HYPERTENSION: ICD-10-CM

## 2025-03-31 DIAGNOSIS — Z36.9 ENCOUNTER FOR ANTENATAL SCREENING: ICD-10-CM

## 2025-03-31 DIAGNOSIS — Z87.59 HISTORY OF PRE-ECLAMPSIA: ICD-10-CM

## 2025-03-31 LAB
ABO GROUP BLD: NORMAL
ALBUMIN SERPL BCG-MCNC: 3.7 G/DL (ref 3.5–5)
ALP SERPL-CCNC: 61 U/L (ref 34–104)
ALT SERPL W P-5'-P-CCNC: 18 U/L (ref 7–52)
AMORPH URATE CRY URNS QL MICRO: ABNORMAL
ANION GAP SERPL CALCULATED.3IONS-SCNC: 7 MMOL/L (ref 4–13)
AST SERPL W P-5'-P-CCNC: 18 U/L (ref 13–39)
BACTERIA UR QL AUTO: ABNORMAL /HPF
BASOPHILS # BLD AUTO: 0.01 THOUSANDS/ÂΜL (ref 0–0.1)
BASOPHILS NFR BLD AUTO: 0 % (ref 0–1)
BILIRUB SERPL-MCNC: 0.47 MG/DL (ref 0.2–1)
BILIRUB UR QL STRIP: NEGATIVE
BLD GP AB SCN SERPL QL: NEGATIVE
BUN SERPL-MCNC: 7 MG/DL (ref 5–25)
CALCIUM SERPL-MCNC: 8.4 MG/DL (ref 8.4–10.2)
CHLORIDE SERPL-SCNC: 105 MMOL/L (ref 96–108)
CLARITY UR: ABNORMAL
CO2 SERPL-SCNC: 23 MMOL/L (ref 21–32)
COLOR UR: YELLOW
CREAT SERPL-MCNC: 0.47 MG/DL (ref 0.6–1.3)
CREAT UR-MCNC: 102.2 MG/DL
EOSINOPHIL # BLD AUTO: 0.07 THOUSAND/ÂΜL (ref 0–0.61)
EOSINOPHIL NFR BLD AUTO: 1 % (ref 0–6)
ERYTHROCYTE [DISTWIDTH] IN BLOOD BY AUTOMATED COUNT: 12.5 % (ref 11.6–15.1)
GFR SERPL CREATININE-BSD FRML MDRD: 132 ML/MIN/1.73SQ M
GLUCOSE 1H P 50 G GLC PO SERPL-MCNC: 107 MG/DL (ref 70–134)
GLUCOSE P FAST SERPL-MCNC: 108 MG/DL (ref 65–99)
GLUCOSE UR STRIP-MCNC: NEGATIVE MG/DL
HCT VFR BLD AUTO: 39.9 % (ref 34.8–46.1)
HGB BLD-MCNC: 13.8 G/DL (ref 11.5–15.4)
HGB UR QL STRIP.AUTO: NEGATIVE
IMM GRANULOCYTES # BLD AUTO: 0.02 THOUSAND/UL (ref 0–0.2)
IMM GRANULOCYTES NFR BLD AUTO: 0 % (ref 0–2)
KETONES UR STRIP-MCNC: NEGATIVE MG/DL
LEUKOCYTE ESTERASE UR QL STRIP: ABNORMAL
LYMPHOCYTES # BLD AUTO: 1.65 THOUSANDS/ÂΜL (ref 0.6–4.47)
LYMPHOCYTES NFR BLD AUTO: 28 % (ref 14–44)
MCH RBC QN AUTO: 30.5 PG (ref 26.8–34.3)
MCHC RBC AUTO-ENTMCNC: 34.6 G/DL (ref 31.4–37.4)
MCV RBC AUTO: 88 FL (ref 82–98)
MONOCYTES # BLD AUTO: 0.37 THOUSAND/ÂΜL (ref 0.17–1.22)
MONOCYTES NFR BLD AUTO: 6 % (ref 4–12)
MUCOUS THREADS UR QL AUTO: ABNORMAL
NEUTROPHILS # BLD AUTO: 3.74 THOUSANDS/ÂΜL (ref 1.85–7.62)
NEUTS SEG NFR BLD AUTO: 65 % (ref 43–75)
NITRITE UR QL STRIP: NEGATIVE
NON-SQ EPI CELLS URNS QL MICRO: ABNORMAL /HPF
NRBC BLD AUTO-RTO: 0 /100 WBCS
PH UR STRIP.AUTO: 7.5 [PH]
PLATELET # BLD AUTO: 220 THOUSANDS/UL (ref 149–390)
PMV BLD AUTO: 12.6 FL (ref 8.9–12.7)
POTASSIUM SERPL-SCNC: 3.7 MMOL/L (ref 3.5–5.3)
PROT SERPL-MCNC: 6.4 G/DL (ref 6.4–8.4)
PROT UR STRIP-MCNC: ABNORMAL MG/DL
PROT UR-MCNC: 12 MG/DL
PROT/CREAT UR: 0.1 MG/G{CREAT} (ref 0–0.1)
RBC # BLD AUTO: 4.53 MILLION/UL (ref 3.81–5.12)
RBC #/AREA URNS AUTO: ABNORMAL /HPF
RH BLD: POSITIVE
RUBV IGG SERPL IA-ACNC: 40.6 IU/ML
SODIUM SERPL-SCNC: 135 MMOL/L (ref 135–147)
SP GR UR STRIP.AUTO: 1.02 (ref 1–1.03)
SPECIMEN EXPIRATION DATE: NORMAL
URATE SERPL-MCNC: 3.7 MG/DL (ref 2–7.5)
UROBILINOGEN UR STRIP-ACNC: <2 MG/DL
WBC # BLD AUTO: 5.86 THOUSAND/UL (ref 4.31–10.16)
WBC #/AREA URNS AUTO: ABNORMAL /HPF

## 2025-03-31 PROCEDURE — 85025 COMPLETE CBC W/AUTO DIFF WBC: CPT

## 2025-03-31 PROCEDURE — 86780 TREPONEMA PALLIDUM: CPT

## 2025-03-31 PROCEDURE — 86900 BLOOD TYPING SEROLOGIC ABO: CPT

## 2025-03-31 PROCEDURE — 81001 URINALYSIS AUTO W/SCOPE: CPT

## 2025-03-31 PROCEDURE — 84156 ASSAY OF PROTEIN URINE: CPT

## 2025-03-31 PROCEDURE — 87389 HIV-1 AG W/HIV-1&-2 AB AG IA: CPT

## 2025-03-31 PROCEDURE — 82570 ASSAY OF URINE CREATININE: CPT

## 2025-03-31 PROCEDURE — 84550 ASSAY OF BLOOD/URIC ACID: CPT

## 2025-03-31 PROCEDURE — 86762 RUBELLA ANTIBODY: CPT

## 2025-03-31 PROCEDURE — 36415 COLL VENOUS BLD VENIPUNCTURE: CPT

## 2025-03-31 PROCEDURE — 86706 HEP B SURFACE ANTIBODY: CPT

## 2025-03-31 PROCEDURE — 86850 RBC ANTIBODY SCREEN: CPT

## 2025-03-31 PROCEDURE — 86901 BLOOD TYPING SEROLOGIC RH(D): CPT

## 2025-03-31 PROCEDURE — 80053 COMPREHEN METABOLIC PANEL: CPT

## 2025-03-31 PROCEDURE — 87086 URINE CULTURE/COLONY COUNT: CPT

## 2025-03-31 PROCEDURE — 86803 HEPATITIS C AB TEST: CPT

## 2025-03-31 PROCEDURE — 82950 GLUCOSE TEST: CPT

## 2025-03-31 PROCEDURE — 87340 HEPATITIS B SURFACE AG IA: CPT

## 2025-04-01 ENCOUNTER — ROUTINE PRENATAL (OUTPATIENT)
Dept: PERINATAL CARE | Facility: CLINIC | Age: 30
End: 2025-04-01
Payer: COMMERCIAL

## 2025-04-01 VITALS
OXYGEN SATURATION: 98 % | HEIGHT: 61 IN | SYSTOLIC BLOOD PRESSURE: 118 MMHG | DIASTOLIC BLOOD PRESSURE: 64 MMHG | BODY MASS INDEX: 34.76 KG/M2 | WEIGHT: 184.1 LBS | HEART RATE: 78 BPM

## 2025-04-01 DIAGNOSIS — Z36.82 ENCOUNTER FOR ANTENATAL SCREENING FOR NUCHAL TRANSLUCENCY: ICD-10-CM

## 2025-04-01 DIAGNOSIS — Z13.79 GENETIC SCREENING: ICD-10-CM

## 2025-04-01 DIAGNOSIS — Z33.1 INCIDENTAL PREGNANCY: ICD-10-CM

## 2025-04-01 DIAGNOSIS — E66.89 OTHER OBESITY AFFECTING PREGNANCY, ANTEPARTUM: ICD-10-CM

## 2025-04-01 DIAGNOSIS — Z3A.13 13 WEEKS GESTATION OF PREGNANCY: Primary | ICD-10-CM

## 2025-04-01 DIAGNOSIS — O99.210 OTHER OBESITY AFFECTING PREGNANCY, ANTEPARTUM: ICD-10-CM

## 2025-04-01 LAB
BACTERIA UR CULT: NORMAL
HBV SURFACE AB SER-ACNC: 309 MIU/ML
HBV SURFACE AG SER QL: NORMAL
HCV AB SER QL: NORMAL
HIV 1+2 AB+HIV1 P24 AG SERPL QL IA: NORMAL
TREPONEMA PALLIDUM IGG+IGM AB [PRESENCE] IN SERUM OR PLASMA BY IMMUNOASSAY: NORMAL

## 2025-04-01 PROCEDURE — 76813 OB US NUCHAL MEAS 1 GEST: CPT | Performed by: OBSTETRICS & GYNECOLOGY

## 2025-04-01 PROCEDURE — 99243 OFF/OP CNSLTJ NEW/EST LOW 30: CPT | Performed by: OBSTETRICS & GYNECOLOGY

## 2025-04-01 NOTE — LETTER
"   Date: 2025    Celia Dumont MD  [address] [id number]    Patient: Lynda Vogt   YOB: 1995   Date of Visit: 2025   Gestational age 13w3d   Nature of this communication: Routine       This patient was seen recently in our  office.  Please see ultrasound report under \"OB Procedures\" tab.  Please don't hesitate to contact our office with any concerns or questions.      Sincerely,      Rhonda Galvan MD  Attending Physician, Maternal-Fetal Medicine  [facility]      "

## 2025-04-02 ENCOUNTER — INITIAL PRENATAL (OUTPATIENT)
Age: 30
End: 2025-04-02

## 2025-04-02 VITALS — SYSTOLIC BLOOD PRESSURE: 122 MMHG | WEIGHT: 185.4 LBS | BODY MASS INDEX: 35.03 KG/M2 | DIASTOLIC BLOOD PRESSURE: 62 MMHG

## 2025-04-02 DIAGNOSIS — Z34.82 PRENATAL CARE, SUBSEQUENT PREGNANCY, SECOND TRIMESTER: Primary | ICD-10-CM

## 2025-04-02 DIAGNOSIS — Z11.3 SCREENING FOR STD (SEXUALLY TRANSMITTED DISEASE): ICD-10-CM

## 2025-04-02 LAB
SL AMB  POCT GLUCOSE, UA: NORMAL
SL AMB POCT URINE PROTEIN: NORMAL

## 2025-04-02 PROCEDURE — 87491 CHLMYD TRACH DNA AMP PROBE: CPT | Performed by: OBSTETRICS & GYNECOLOGY

## 2025-04-02 PROCEDURE — 87591 N.GONORRHOEAE DNA AMP PROB: CPT | Performed by: OBSTETRICS & GYNECOLOGY

## 2025-04-02 NOTE — PROGRESS NOTES
1ST OB VISIT  13w3d  Pn1 Labs: Completed   -Including 1he GTT = 107  Nuchal (completed 4/1/25)  CF+ SMA declined  G:  3     P:  2  LMP: 12/10/24   MARIBEL: 10/5/25  Last Annual Visit: 11/28/23 NILM/HPV Neg  Last Pap: 11/28/23    Pap NOT indicated today  GC/CH collected today  Blood Type: A Positive    Denies Leaking of Fluid, vaginal bleeding, Cramping

## 2025-04-02 NOTE — PROGRESS NOTES
"[facility]: Lynda was seen today for nuchal translucency ultrasound.  See ultrasound report under \"OB Procedures\" tab.       MDM:   I. Diagnoses/Problems addressed:  Self limited or minor problem: uncomplicated pregnancy  II.  Data:   III.  Risk of morbidity: Moderate    Please don't hesitate to contact our office with any concerns or questions.  -Rhonda Galvan MD  "

## 2025-04-02 NOTE — PROGRESS NOTES
Assessment:     Pregnancy at 13 and 3/7 weeks    History of preeclampsia    Plan:    Initial labs and baseline preE labs drawn; all normal  Prenatal vitamins.  Problem list reviewed and updated.  NT sono normal; NIPT declined  AFP @ 16-18 weeks  Level II @ 20 weeks  Follow up in 4 weeks.  Greater than 50% of 30 min visit spent on counseling and coordination of care.         Michael Vogt is being seen today for her first obstetrical visit.  This is a planned pregnancy. She is at 13w3d gestation. Denies any VB.  Her obstetrical history is significant for pre-eclampsia. Relationship with FOB: spouse, living together. Patient does intend to breast feed. Pregnancy history fully reviewed.    Menstrual History:  OB History          3    Para   2    Term   2       0    AB   0    Living   2         SAB   0    IAB   0    Ectopic   0    Multiple   0    Live Births   2           Obstetric Comments   [facility]              Patient's last menstrual period was 12/10/2024 (exact date).     Past Medical History:   Diagnosis Date    Asthma     Gestational hypertension     Pre-eclampsia     first pregnancy    Seasonal allergies     Strabismus     corrected in     Varicella     had vaccine       Past Surgical History:   Procedure Laterality Date    EYE SURGERY      strabismus correction     WISDOM TOOTH EXTRACTION         Current Outpatient Medications on File Prior to Visit   Medication Sig    aspirin 81 mg chewable tablet Chew 162 mg daily    Prenatal-FeFum-FA-DHA w/o A (PRENATAL + DHA PO) Take by mouth     No current facility-administered medications on file prior to visit.       Allergies   Allergen Reactions    Wenden Flavor [Flavoring Agent - Food Allergy] Throat Swelling       Social History     Tobacco Use    Smoking status: Never    Smokeless tobacco: Never   Vaping Use    Vaping status: Never Used   Substance Use Topics    Alcohol use: Not Currently     Comment:  Social    Drug use: Never       Family History   Problem Relation Age of Onset    Lupus Mother     Migraines Mother     Thyroid disease Mother     Thyroid cancer Mother     No Known Problems Father     Other (Other) Sister         carriers gene for Lupus    Congenital heart disease Sister         needed a pacemaker at 1yrs old    Other (Other) Sister         carriers gene for Lupus    Raynaud syndrome Sister     No Known Problems Brother     No Known Problems Daughter     No Known Problems Daughter     Migraines Maternal Grandmother     Lupus Maternal Grandmother     Diabetes Maternal Grandfather     Heart disease Maternal Grandfather     Arthritis Paternal Grandmother     Diabetes Paternal Grandmother     Hypertension Paternal Grandmother     Obesity Paternal Grandmother     Stroke Paternal Grandmother     Skin cancer Paternal Grandmother     Arthritis Paternal Grandfather     Skin cancer Paternal Grandfather     Migraines Maternal Aunt     Arthritis Paternal Uncle     Breast cancer Other         PGGM       The following portions of the patient's history were reviewed and updated as appropriate: allergies, current medications, past family history, past medical history, past social history, past surgical history, and problem list.    Review of Systems  Pertinent items are noted in HPI.      Objective    Vitals:    04/02/25 1345   BP: 122/62        See prenatal physical

## 2025-04-03 LAB
C TRACH DNA SPEC QL NAA+PROBE: NEGATIVE
N GONORRHOEA DNA SPEC QL NAA+PROBE: NEGATIVE

## 2025-04-04 ENCOUNTER — RESULTS FOLLOW-UP (OUTPATIENT)
Age: 30
End: 2025-04-04

## 2025-04-29 ENCOUNTER — ROUTINE PRENATAL (OUTPATIENT)
Age: 30
End: 2025-04-29
Payer: COMMERCIAL

## 2025-04-29 VITALS — BODY MASS INDEX: 35.22 KG/M2 | SYSTOLIC BLOOD PRESSURE: 100 MMHG | WEIGHT: 186.4 LBS | DIASTOLIC BLOOD PRESSURE: 70 MMHG

## 2025-04-29 DIAGNOSIS — Z34.82 PRENATAL CARE, SUBSEQUENT PREGNANCY, SECOND TRIMESTER: Primary | ICD-10-CM

## 2025-04-29 DIAGNOSIS — Z33.1 INCIDENTAL PREGNANCY: ICD-10-CM

## 2025-04-29 DIAGNOSIS — Z36.9 ENCOUNTER FOR ANTENATAL SCREENING: ICD-10-CM

## 2025-04-29 LAB
SL AMB  POCT GLUCOSE, UA: NORMAL
SL AMB POCT URINE PROTEIN: NORMAL

## 2025-04-29 PROCEDURE — 81002 URINALYSIS NONAUTO W/O SCOPE: CPT | Performed by: OBSTETRICS & GYNECOLOGY

## 2025-04-29 PROCEDURE — PNV: Performed by: OBSTETRICS & GYNECOLOGY

## 2025-04-29 NOTE — PROGRESS NOTES
PN visit  17w/2d    She is feeling movement  PN labs completed; passed 1 hr GTT  NT scan 4/1/25  Level II US scheduled: 5/22/25  Order for AFP screen placed    GC/   Blood Type: A Positive    Denies Leaking of Fluid, vaginal bleeding, or cramping

## 2025-04-29 NOTE — PROGRESS NOTES
Pt reports Quickening for a few weeks now. Denies any VB, LOF, or cramping.      For MSaFP.  Beth u/s 5/21.      H/o PreE:  continue baby ASA until 36wks.  Will repeat in 2nd trimester.      PTL, wt gain, OK for Magnesium supplements, low salt diet.

## 2025-05-01 ENCOUNTER — TELEPHONE (OUTPATIENT)
Dept: OBGYN CLINIC | Facility: CLINIC | Age: 30
End: 2025-05-01

## 2025-05-01 ENCOUNTER — PATIENT MESSAGE (OUTPATIENT)
Dept: OBGYN CLINIC | Facility: CLINIC | Age: 30
End: 2025-05-01

## 2025-05-01 NOTE — TELEPHONE ENCOUNTER
Attempted to contact patient for 2nd Trimester Check-in Call. Pt unavailable. [employer] msg was sent  4/30/25.  Mailbox full, unable to leave VM

## 2025-05-22 ENCOUNTER — ROUTINE PRENATAL (OUTPATIENT)
Dept: PERINATAL CARE | Facility: CLINIC | Age: 30
End: 2025-05-22
Payer: COMMERCIAL

## 2025-05-22 ENCOUNTER — ANCILLARY PROCEDURE (OUTPATIENT)
Dept: PERINATAL CARE | Facility: CLINIC | Age: 30
End: 2025-05-22
Attending: OBSTETRICS & GYNECOLOGY
Payer: COMMERCIAL

## 2025-05-22 VITALS
BODY MASS INDEX: 35.87 KG/M2 | HEART RATE: 78 BPM | DIASTOLIC BLOOD PRESSURE: 62 MMHG | SYSTOLIC BLOOD PRESSURE: 112 MMHG | WEIGHT: 190 LBS | HEIGHT: 61 IN

## 2025-05-22 DIAGNOSIS — Z3A.20 20 WEEKS GESTATION OF PREGNANCY: Primary | ICD-10-CM

## 2025-05-22 DIAGNOSIS — Z3A.20 20 WEEKS GESTATION OF PREGNANCY: ICD-10-CM

## 2025-05-22 PROBLEM — O99.212 OBESITY COMPLICATING PREGNANCY IN SECOND TRIMESTER: Status: ACTIVE | Noted: 2025-05-22

## 2025-05-22 PROCEDURE — 76811 OB US DETAILED SNGL FETUS: CPT

## 2025-05-22 PROCEDURE — 76817 TRANSVAGINAL US OBSTETRIC: CPT

## 2025-05-22 NOTE — ASSESSMENT & PLAN NOTE
Normal fetal anatomy and cervical length screening. Interval growth ultrasound advised at around 30 weeks gestation.  labor precautions reviewed.

## 2025-05-22 NOTE — LETTER
"   Date: 2025    Brina Sherman MD  [address] [address]    Patient: Lynda Vogt   YOB: 1995   Date of Visit: 2025   Gestational age 20w4d   Nature of this communication: Routine       This patient was seen recently in our  office.  Please see ultrasound report under \"Imaging\" tab.  Please don't hesitate to contact our office with any concerns or questions.      Sincerely,      Aaliyah Ross MD  Attending Physician, Maternal-Fetal Medicine  [facility]    "

## 2025-05-22 NOTE — PROGRESS NOTES
"[facility]: Lynda Vogt was seen today for anatomic survey and cervical length screening ultrasound. See ultrasound report under \"Imaging\" tab.     HPI: No OB complaints, some occasional cramping, no blood or leakage of fluid.   Vitals:    25 1239   BP: 112/62   Pulse: 78     Problem List Items Addressed This Visit          Obstetrics/Gynecology    20 weeks gestation of pregnancy - Primary    Normal fetal anatomy and cervical length screening. Interval growth ultrasound advised at around 30 weeks gestation.  labor precautions reviewed.             Evaluation/Management:The total time dedicated to this patient encounter was 15 minutes (5 preparation, 5 face-to-face, and 5 documenting).    Please don't hesitate to contact our office with any concerns or questions.    -Aaliyah Ross MD  "

## 2025-05-28 ENCOUNTER — ROUTINE PRENATAL (OUTPATIENT)
Age: 30
End: 2025-05-28
Payer: COMMERCIAL

## 2025-05-28 VITALS — WEIGHT: 192.4 LBS | DIASTOLIC BLOOD PRESSURE: 68 MMHG | BODY MASS INDEX: 36.35 KG/M2 | SYSTOLIC BLOOD PRESSURE: 104 MMHG

## 2025-05-28 DIAGNOSIS — Z34.82 PRENATAL CARE, SUBSEQUENT PREGNANCY, SECOND TRIMESTER: Primary | ICD-10-CM

## 2025-05-28 LAB
SL AMB  POCT GLUCOSE, UA: NEGATIVE
SL AMB POCT URINE PROTEIN: ABNORMAL

## 2025-05-28 PROCEDURE — PNV: Performed by: OBSTETRICS & GYNECOLOGY

## 2025-05-28 PROCEDURE — 81002 URINALYSIS NONAUTO W/O SCOPE: CPT | Performed by: OBSTETRICS & GYNECOLOGY

## 2025-05-28 NOTE — PROGRESS NOTES
2nd Trimester Visit  Name: Lynda Vogt      : 1995      MRN: [id number]  Encounter Provider: Brina Salas MD  Encounter Date: 2025   Encounter department: [facility] [place]    29 y.o.  at 21w3d presenting for routine OB visit at 21w3d.:  Assessment & Plan  Prenatal care, subsequent pregnancy, second trimester    Orders:    POCT urine dip        Scheduled for ultrasound at 32wks (growth).  Reviewed common discomforts of pregnancy in second trimester and warning signs.  Advised to continue medications and return in 4 weeks.    History of Present Illness     She reports she is doing great.    This will be third girl!  Occasional BH contraction.  Denies vaginal bleeding or leaking of fluid.  Reports fetal movement.  Will decline MSAFP at this time.    Level II US complete/no concerns.          Current Outpatient Medications   Medication Instructions    aspirin 162 mg, Daily    Prenatal-FeFum-FA-DHA w/o A (PRENATAL + DHA PO) Take by mouth     Objective   /68   Wt 87.3 kg (192 lb 6.4 oz)   LMP 12/10/2024 (Exact Date)   BMI 36.35 kg/m²      BP: Blood Pressure: 104/68  Wt: 87.3 kg (192 lb 6.4 oz); Body mass index is 36.35 kg/m².; TWG=3.357 kg (7 lb 6.4 oz)  Fetal Heart Rate: 145;      Abdomen: soft non-tender

## 2025-05-28 NOTE — PROGRESS NOTES
PN visit    21w3d  Level II 5/22/25 - baby GIRL!!  32wk growth scan scheduled 8/14/25  AFP not completed- reminded should be completed by 5/31/25- will be declining    Denies loss of fluid, some mild contractions or bleeding  +Fetal movement    EPDS: 1

## 2025-06-30 ENCOUNTER — ROUTINE PRENATAL (OUTPATIENT)
Age: 30
End: 2025-06-30

## 2025-06-30 VITALS — WEIGHT: 194.4 LBS | DIASTOLIC BLOOD PRESSURE: 80 MMHG | BODY MASS INDEX: 36.73 KG/M2 | SYSTOLIC BLOOD PRESSURE: 108 MMHG

## 2025-06-30 DIAGNOSIS — Z11.3 SCREENING FOR STD (SEXUALLY TRANSMITTED DISEASE): ICD-10-CM

## 2025-06-30 DIAGNOSIS — Z34.82 PRENATAL CARE, SUBSEQUENT PREGNANCY, SECOND TRIMESTER: Primary | ICD-10-CM

## 2025-06-30 PROCEDURE — PNV: Performed by: OBSTETRICS & GYNECOLOGY

## 2025-06-30 NOTE — PROGRESS NOTES
2nd Trimester Visit  Name: Lynda Vogt      : 1995      MRN: [id number]  Encounter Provider: Brina Salas MD  Encounter Date: 2025   Encounter department: [facility] [place]    29 y.o.  at 26w1d presenting for routine OB visit at 26w1d.:  Assessment & Plan  Prenatal care, subsequent pregnancy, second trimester    Orders:    Anemia Panel w/Reflex, OB; Future    CBC and differential; Future    Glucose, 1H PG; Future    Screening for STD (sexually transmitted disease)    Orders:    RPR-Syphilis Screening (Total Syphilis IGG/IGM); Future        Scheduled for ultrasound - growth 32wks .  Reviewed common discomforts of pregnancy in second trimester and warning signs.  Advised to continue medications and return in 2 weeks.    History of Present Illness     She reports she is doing well.  Denies uterine contractions or persistent cramping.  Denies vaginal bleeding or leaking of fluid.  Reports fetal movement.     Current Outpatient Medications   Medication Instructions    aspirin 162 mg, Daily    Prenatal-FeFum-FA-DHA w/o A (PRENATAL + DHA PO) Take by mouth     Objective   /80   Wt 88.2 kg (194 lb 6.4 oz)   LMP 12/10/2024 (Exact Date)   BMI 36.73 kg/m²      BP: Blood Pressure: 108/80  Wt: 88.2 kg (194 lb 6.4 oz); Body mass index is 36.73 kg/m².; TWG=4.264 kg (9 lb 6.4 oz)  Fetal Heart Rate: 150; Fundal Height (cm): 26 cm    Abdomen: soft, non-tender

## 2025-06-30 NOTE — PROGRESS NOTES
PN visit    26w1d  28wk labs ordered today  32wk growth scan 8/14/25  AFP declined    Denies loss of fluid, contractions or bleeding  +Fetal movement

## 2025-07-14 ENCOUNTER — TELEPHONE (OUTPATIENT)
Age: 30
End: 2025-07-14

## 2025-07-17 ENCOUNTER — APPOINTMENT (OUTPATIENT)
Dept: LAB | Facility: CLINIC | Age: 30
End: 2025-07-17
Payer: COMMERCIAL

## 2025-07-17 DIAGNOSIS — Z34.82 PRENATAL CARE, SUBSEQUENT PREGNANCY, SECOND TRIMESTER: ICD-10-CM

## 2025-07-17 DIAGNOSIS — Z11.3 SCREENING FOR STD (SEXUALLY TRANSMITTED DISEASE): ICD-10-CM

## 2025-07-17 LAB
BASOPHILS # BLD AUTO: 0.02 THOUSANDS/ÂΜL (ref 0–0.1)
BASOPHILS NFR BLD AUTO: 0 % (ref 0–1)
EOSINOPHIL # BLD AUTO: 0.08 THOUSAND/ÂΜL (ref 0–0.61)
EOSINOPHIL NFR BLD AUTO: 1 % (ref 0–6)
ERYTHROCYTE [DISTWIDTH] IN BLOOD BY AUTOMATED COUNT: 13.4 % (ref 11.6–15.1)
HCT VFR BLD AUTO: 37.2 % (ref 34.8–46.1)
HGB BLD-MCNC: 12.3 G/DL (ref 11.5–15.4)
IMM GRANULOCYTES # BLD AUTO: 0.03 THOUSAND/UL (ref 0–0.2)
IMM GRANULOCYTES NFR BLD AUTO: 0 % (ref 0–2)
LYMPHOCYTES # BLD AUTO: 1.7 THOUSANDS/ÂΜL (ref 0.6–4.47)
LYMPHOCYTES NFR BLD AUTO: 21 % (ref 14–44)
MCH RBC QN AUTO: 29.5 PG (ref 26.8–34.3)
MCHC RBC AUTO-ENTMCNC: 33.1 G/DL (ref 31.4–37.4)
MCV RBC AUTO: 89 FL (ref 82–98)
MONOCYTES # BLD AUTO: 0.5 THOUSAND/ÂΜL (ref 0.17–1.22)
MONOCYTES NFR BLD AUTO: 6 % (ref 4–12)
NEUTROPHILS # BLD AUTO: 5.69 THOUSANDS/ÂΜL (ref 1.85–7.62)
NEUTS SEG NFR BLD AUTO: 72 % (ref 43–75)
NRBC BLD AUTO-RTO: 0 /100 WBCS
PLATELET # BLD AUTO: 223 THOUSANDS/UL (ref 149–390)
PMV BLD AUTO: 12.3 FL (ref 8.9–12.7)
RBC # BLD AUTO: 4.17 MILLION/UL (ref 3.81–5.12)
WBC # BLD AUTO: 8.02 THOUSAND/UL (ref 4.31–10.16)

## 2025-07-17 PROCEDURE — 85025 COMPLETE CBC W/AUTO DIFF WBC: CPT

## 2025-07-17 PROCEDURE — 36415 COLL VENOUS BLD VENIPUNCTURE: CPT

## 2025-07-17 PROCEDURE — 86780 TREPONEMA PALLIDUM: CPT

## 2025-07-18 LAB — TREPONEMA PALLIDUM IGG+IGM AB [PRESENCE] IN SERUM OR PLASMA BY IMMUNOASSAY: NORMAL

## 2025-07-22 ENCOUNTER — ROUTINE PRENATAL (OUTPATIENT)
Age: 30
End: 2025-07-22

## 2025-07-22 VITALS — SYSTOLIC BLOOD PRESSURE: 102 MMHG | DIASTOLIC BLOOD PRESSURE: 70 MMHG | WEIGHT: 196.8 LBS | BODY MASS INDEX: 37.19 KG/M2

## 2025-07-22 DIAGNOSIS — Z34.83 ENCOUNTER FOR SUPERVISION OF OTHER NORMAL PREGNANCY IN THIRD TRIMESTER: Primary | ICD-10-CM

## 2025-07-22 PROCEDURE — PNV: Performed by: OBSTETRICS & GYNECOLOGY

## 2025-07-22 NOTE — PROGRESS NOTES
PN visit    29w2d  28wk labs completed except 1 hr GTT  Yellow folder given   Delivery consent signed   Growth scan scheduled 8/14/25  Denies loss of fluid or spotting; has light contractions  +Fetal movement  Tdap offered; she declined.

## 2025-07-22 NOTE — PROGRESS NOTES
Pt reports +FM, denies LOF, VB.  Now getting BH ctx.      Delivery consent signed.  Yellow folder given.      Rh pos.     Declined to do another 1hr as 3/31 1hr was wnl @ 107.   Suggest doing urine dips to check for glucose.  Pt aware this is not standard of care.      S>D, will keep an eye on measurements.  Has f/u u/s on 8/14.      Vasectomy planned after baby born.     PTL, ROC, wt gain, hydration reviewed.

## 2025-08-07 ENCOUNTER — ROUTINE PRENATAL (OUTPATIENT)
Age: 30
End: 2025-08-07

## 2025-08-07 VITALS — WEIGHT: 199 LBS | DIASTOLIC BLOOD PRESSURE: 78 MMHG | BODY MASS INDEX: 37.6 KG/M2 | SYSTOLIC BLOOD PRESSURE: 110 MMHG

## 2025-08-07 DIAGNOSIS — Z34.83 PRENATAL CARE, SUBSEQUENT PREGNANCY, THIRD TRIMESTER: Primary | ICD-10-CM

## 2025-08-07 PROCEDURE — PNV: Performed by: OBSTETRICS & GYNECOLOGY

## 2025-08-09 DIAGNOSIS — Z3A.31 31 WEEKS GESTATION OF PREGNANCY: Primary | ICD-10-CM

## 2025-08-12 ENCOUNTER — TELEPHONE (OUTPATIENT)
Age: 30
End: 2025-08-12

## 2025-08-14 ENCOUNTER — ANCILLARY PROCEDURE (OUTPATIENT)
Dept: PERINATAL CARE | Facility: CLINIC | Age: 30
End: 2025-08-14
Attending: OBSTETRICS & GYNECOLOGY
Payer: COMMERCIAL

## 2025-08-14 ENCOUNTER — ULTRASOUND (OUTPATIENT)
Dept: PERINATAL CARE | Facility: CLINIC | Age: 30
End: 2025-08-14
Payer: COMMERCIAL

## 2025-08-14 ENCOUNTER — APPOINTMENT (OUTPATIENT)
Dept: LAB | Facility: CLINIC | Age: 30
End: 2025-08-14
Payer: COMMERCIAL

## 2025-08-20 ENCOUNTER — TELEPHONE (OUTPATIENT)
Age: 30
End: 2025-08-20

## 2025-08-20 ENCOUNTER — ROUTINE PRENATAL (OUTPATIENT)
Age: 30
End: 2025-08-20

## 2025-08-20 VITALS — DIASTOLIC BLOOD PRESSURE: 70 MMHG | BODY MASS INDEX: 38.24 KG/M2 | SYSTOLIC BLOOD PRESSURE: 108 MMHG | WEIGHT: 202.4 LBS

## 2025-08-20 DIAGNOSIS — Z34.83 PRENATAL CARE, SUBSEQUENT PREGNANCY, THIRD TRIMESTER: Primary | ICD-10-CM

## 2025-08-20 PROCEDURE — PNV: Performed by: OBSTETRICS & GYNECOLOGY
